# Patient Record
Sex: MALE | Race: BLACK OR AFRICAN AMERICAN | NOT HISPANIC OR LATINO | Employment: OTHER | URBAN - METROPOLITAN AREA
[De-identification: names, ages, dates, MRNs, and addresses within clinical notes are randomized per-mention and may not be internally consistent; named-entity substitution may affect disease eponyms.]

---

## 2023-02-12 PROBLEM — G20.C PARKINSONISM (MULTI): Status: ACTIVE | Noted: 2023-02-12

## 2023-02-12 PROBLEM — R14.0 ABDOMINAL DISTENSION: Status: ACTIVE | Noted: 2023-02-12

## 2023-02-12 PROBLEM — J20.9 ACUTE BRONCHITIS WITH BRONCHOSPASM: Status: ACTIVE | Noted: 2023-02-12

## 2023-02-12 PROBLEM — R00.2 HEART PALPITATIONS: Status: ACTIVE | Noted: 2023-02-12

## 2023-02-12 PROBLEM — T78.3XXA ANGIOEDEMA: Status: ACTIVE | Noted: 2023-02-12

## 2023-02-12 PROBLEM — J84.9 INTERSTITIAL LUNG DISEASE (MULTI): Status: ACTIVE | Noted: 2023-02-12

## 2023-02-12 PROBLEM — R25.1 TREMOR, UNSPECIFIED: Status: ACTIVE | Noted: 2023-02-12

## 2023-02-12 PROBLEM — I10 HYPERTENSION: Status: ACTIVE | Noted: 2023-02-12

## 2023-02-12 PROBLEM — D68.9 COAGULOPATHY (MULTI): Status: ACTIVE | Noted: 2023-02-12

## 2023-02-12 PROBLEM — R05.9 COUGH: Status: ACTIVE | Noted: 2023-02-12

## 2023-02-12 PROBLEM — D12.6 TUBULAR ADENOMA OF COLON: Status: ACTIVE | Noted: 2023-02-12

## 2023-02-12 PROBLEM — K59.00 CONSTIPATION: Status: ACTIVE | Noted: 2023-02-12

## 2023-02-12 PROBLEM — J18.9 PNEUMONIA OF RIGHT LOWER LOBE DUE TO INFECTIOUS ORGANISM: Status: ACTIVE | Noted: 2023-02-12

## 2023-02-12 PROBLEM — J32.9 PURULENT POSTNASAL DRAINAGE: Status: ACTIVE | Noted: 2023-02-12

## 2023-02-12 PROBLEM — N18.31 ANEMIA OF CHRONIC RENAL FAILURE, STAGE 3A (MULTI): Status: ACTIVE | Noted: 2023-02-12

## 2023-02-12 PROBLEM — N28.1 COMPLEX RENAL CYST: Status: ACTIVE | Noted: 2023-02-12

## 2023-02-12 PROBLEM — I50.9 CHF (CONGESTIVE HEART FAILURE) (MULTI): Status: ACTIVE | Noted: 2023-02-12

## 2023-02-12 PROBLEM — J30.9 ALLERGIC RHINITIS: Status: ACTIVE | Noted: 2023-02-12

## 2023-02-12 PROBLEM — I48.91 ATRIAL FIBRILLATION (MULTI): Status: ACTIVE | Noted: 2023-02-12

## 2023-02-12 PROBLEM — J45.909 ASTHMA (HHS-HCC): Status: ACTIVE | Noted: 2023-02-12

## 2023-02-12 PROBLEM — E11.9 ABNORMAL METABOLIC STATE DUE TO DIABETES MELLITUS (MULTI): Status: ACTIVE | Noted: 2023-02-12

## 2023-02-12 PROBLEM — N39.0 ACUTE UTI: Status: ACTIVE | Noted: 2023-02-12

## 2023-02-12 PROBLEM — R97.20 ELEVATED PROSTATE SPECIFIC ANTIGEN (PSA): Status: ACTIVE | Noted: 2023-02-12

## 2023-02-12 PROBLEM — R25.1 OCCASIONAL TREMORS: Status: ACTIVE | Noted: 2023-02-12

## 2023-02-12 PROBLEM — R07.89 ATYPICAL CHEST PAIN: Status: ACTIVE | Noted: 2023-02-12

## 2023-02-12 PROBLEM — D63.1 ANEMIA OF CHRONIC RENAL FAILURE, STAGE 3A (MULTI): Status: ACTIVE | Noted: 2023-02-12

## 2023-02-12 PROBLEM — I73.9 INTERMITTENT CLAUDICATION (CMS-HCC): Status: ACTIVE | Noted: 2023-02-12

## 2023-02-12 PROBLEM — N40.0 ENLARGED PROSTATE WITHOUT LOWER URINARY TRACT SYMPTOMS (LUTS): Status: ACTIVE | Noted: 2023-02-12

## 2023-02-12 PROBLEM — E78.5 HYPERLIPIDEMIA: Status: ACTIVE | Noted: 2023-02-12

## 2023-02-12 PROBLEM — N13.8 BPH WITH OBSTRUCTION/LOWER URINARY TRACT SYMPTOMS: Status: ACTIVE | Noted: 2023-02-12

## 2023-02-12 PROBLEM — F52.4 PREMATURE EJACULATION: Status: ACTIVE | Noted: 2023-02-12

## 2023-02-12 PROBLEM — N52.9 ED (ERECTILE DYSFUNCTION): Status: ACTIVE | Noted: 2023-02-12

## 2023-02-12 PROBLEM — K62.5 RECTAL BLEED: Status: ACTIVE | Noted: 2023-02-12

## 2023-02-12 PROBLEM — N40.1 BPH WITH OBSTRUCTION/LOWER URINARY TRACT SYMPTOMS: Status: ACTIVE | Noted: 2023-02-12

## 2023-02-12 PROBLEM — D64.9 ANEMIA: Status: ACTIVE | Noted: 2023-02-12

## 2023-02-12 PROBLEM — R06.2 WHEEZING: Status: ACTIVE | Noted: 2023-02-12

## 2023-02-12 PROBLEM — R25.2 CRAMP OF BOTH LOWER EXTREMITIES: Status: ACTIVE | Noted: 2023-02-12

## 2023-02-12 PROBLEM — R01.1 HEART MURMUR: Status: ACTIVE | Noted: 2023-02-12

## 2023-02-12 PROBLEM — N28.9 KIDNEY LESION: Status: ACTIVE | Noted: 2023-02-12

## 2023-03-27 ENCOUNTER — NURSING HOME VISIT (OUTPATIENT)
Dept: POST ACUTE CARE | Facility: EXTERNAL LOCATION | Age: 78
End: 2023-03-27
Payer: COMMERCIAL

## 2023-03-27 DIAGNOSIS — R53.1 WEAKNESS: Primary | ICD-10-CM

## 2023-03-27 DIAGNOSIS — I48.0 PAROXYSMAL ATRIAL FIBRILLATION (MULTI): ICD-10-CM

## 2023-03-27 DIAGNOSIS — K59.00 CONSTIPATION, UNSPECIFIED CONSTIPATION TYPE: ICD-10-CM

## 2023-03-27 DIAGNOSIS — G20.A1 PARKINSON'S DISEASE (MULTI): ICD-10-CM

## 2023-03-27 DIAGNOSIS — I10 HYPERTENSION, ESSENTIAL: ICD-10-CM

## 2023-03-27 DIAGNOSIS — I50.9 CHRONIC CONGESTIVE HEART FAILURE, UNSPECIFIED HEART FAILURE TYPE (MULTI): ICD-10-CM

## 2023-03-27 PROBLEM — J30.9 ALLERGIC RHINITIS: Status: RESOLVED | Noted: 2023-02-12 | Resolved: 2023-03-27

## 2023-03-27 PROBLEM — R25.2 CRAMP OF BOTH LOWER EXTREMITIES: Status: RESOLVED | Noted: 2023-02-12 | Resolved: 2023-03-27

## 2023-03-27 PROBLEM — K62.5 RECTAL BLEED: Status: RESOLVED | Noted: 2023-02-12 | Resolved: 2023-03-27

## 2023-03-27 PROBLEM — N39.0 ACUTE UTI: Status: RESOLVED | Noted: 2023-02-12 | Resolved: 2023-03-27

## 2023-03-27 PROBLEM — R14.0 ABDOMINAL DISTENSION: Status: RESOLVED | Noted: 2023-02-12 | Resolved: 2023-03-27

## 2023-03-27 PROBLEM — J18.9 PNEUMONIA OF RIGHT LOWER LOBE DUE TO INFECTIOUS ORGANISM: Status: RESOLVED | Noted: 2023-02-12 | Resolved: 2023-03-27

## 2023-03-27 PROBLEM — J32.9 PURULENT POSTNASAL DRAINAGE: Status: RESOLVED | Noted: 2023-02-12 | Resolved: 2023-03-27

## 2023-03-27 PROBLEM — R06.2 WHEEZING: Status: RESOLVED | Noted: 2023-02-12 | Resolved: 2023-03-27

## 2023-03-27 PROBLEM — R05.9 COUGH: Status: RESOLVED | Noted: 2023-02-12 | Resolved: 2023-03-27

## 2023-03-27 PROCEDURE — 99309 SBSQ NF CARE MODERATE MDM 30: CPT | Performed by: NURSE PRACTITIONER

## 2023-03-27 NOTE — LETTER
Patient: Shashi Gutierrez  : 1945    Encounter Date: 2023    PROGRESS NOTE  Subjective  Chief complaint: Shashi Gutierrez is a 77 y.o. male who is an acute skilled patient being seen and evaluated for weakness    HPI:  3/27/2023 patient admitted to skilled nursing facility for therapy due to weakness after recent hospitalization for difficulty walking.  Patient was experiencing difficulty walking for 2 days due to leg weakness so he went to the emergency room.  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  Neurology recommended physical therapy and fall precautions.  Patient was discharged to skilled nursing facility for therapy and to continue medical management.  Today patient endorses difficulty moving his bowels.  He denies abdominal pain nausea and vomiting.      Objective  Vital signs: 112/68, 18, 97.6, 68, 95%    Physical Exam  Constitutional:       General: He is not in acute distress.  Eyes:      Extraocular Movements: Extraocular movements intact.   Cardiovascular:      Rate and Rhythm: Regular rhythm.   Pulmonary:      Effort: Pulmonary effort is normal.      Breath sounds: Normal breath sounds.   Abdominal:      General: Bowel sounds are normal.      Palpations: Abdomen is soft.   Musculoskeletal:      Cervical back: Neck supple.      Right lower leg: Edema present.      Left lower leg: Edema present.      Comments: Generalized weakness   Neurological:      Mental Status: He is alert.   Psychiatric:         Mood and Affect: Mood normal.         Behavior: Behavior is cooperative.         Assessment/Plan  Problem List Items Addressed This Visit       CHF (congestive heart failure) (CMS/[facility])     Stable  Diuretic  Low-sodium diet  Monitor weight         Constipation     Increase senna         Hypertension, essential      Controlled  Continue antihypertensives  Continue to monitor blood pressure         Parkinson's disease (CMS/HCC)     Therapy to eval and treat         Paroxysmal atrial fibrillation (CMS/HCC)     Heart rate controlled  Anticoagulant  Monitor for bleeding         Weakness - Primary     Therapy          Medications, treatments, and labs reviewed  Continue medications and treatments as listed in PCC    TERENCE Dave      Electronically Signed By: TERENCE Dave   3/27/23  6:44 PM

## 2023-03-27 NOTE — PROGRESS NOTES
PROGRESS NOTE  Subjective   Chief complaint: Shashi Gutierrez is a 77 y.o. male who is an acute skilled patient being seen and evaluated for weakness    HPI:  3/27/2023 patient admitted to skilled nursing facility for therapy due to weakness after recent hospitalization for difficulty walking.  Patient was experiencing difficulty walking for 2 days due to leg weakness so he went to the emergency room.  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  Neurology recommended physical therapy and fall precautions.  Patient was discharged to skilled nursing facility for therapy and to continue medical management.  Today patient endorses difficulty moving his bowels.  He denies abdominal pain nausea and vomiting.      Objective   Vital signs: 112/68, 18, 97.6, 68, 95%    Physical Exam  Constitutional:       General: He is not in acute distress.  Eyes:      Extraocular Movements: Extraocular movements intact.   Cardiovascular:      Rate and Rhythm: Regular rhythm.   Pulmonary:      Effort: Pulmonary effort is normal.      Breath sounds: Normal breath sounds.   Abdominal:      General: Bowel sounds are normal.      Palpations: Abdomen is soft.   Musculoskeletal:      Cervical back: Neck supple.      Right lower leg: Edema present.      Left lower leg: Edema present.      Comments: Generalized weakness   Neurological:      Mental Status: He is alert.   Psychiatric:         Mood and Affect: Mood normal.         Behavior: Behavior is cooperative.         Assessment/Plan   Problem List Items Addressed This Visit       CHF (congestive heart failure) (CMS/[facility])     Stable  Diuretic  Low-sodium diet  Monitor weight         Constipation     Increase senna         Hypertension, essential     Controlled  Continue antihypertensives  Continue to monitor blood pressure          Parkinson's disease (CMS/HCC)     Therapy to eval and treat         Paroxysmal atrial fibrillation (CMS/HCC)     Heart rate controlled  Anticoagulant  Monitor for bleeding         Weakness - Primary     Therapy          Medications, treatments, and labs reviewed  Continue medications and treatments as listed in PCC    ASHLEY Dave-CNP

## 2023-03-27 NOTE — ASSESSMENT & PLAN NOTE
>>ASSESSMENT AND PLAN FOR PAROXYSMAL ATRIAL FIBRILLATION (CMS/HCC) WRITTEN ON 3/27/2023  6:44 PM BY ASHLEY SORTO-MARCIAL    Heart rate controlled  Anticoagulant  Monitor for bleeding

## 2023-03-28 ENCOUNTER — NURSING HOME VISIT (OUTPATIENT)
Dept: POST ACUTE CARE | Facility: EXTERNAL LOCATION | Age: 78
End: 2023-03-28
Payer: COMMERCIAL

## 2023-03-28 DIAGNOSIS — I50.9 CHRONIC CONGESTIVE HEART FAILURE, UNSPECIFIED HEART FAILURE TYPE (MULTI): ICD-10-CM

## 2023-03-28 DIAGNOSIS — J84.9 INTERSTITIAL LUNG DISEASE (MULTI): ICD-10-CM

## 2023-03-28 DIAGNOSIS — G20.A1 PARKINSON'S DISEASE (MULTI): ICD-10-CM

## 2023-03-28 DIAGNOSIS — R25.1 TREMOR, UNSPECIFIED: ICD-10-CM

## 2023-03-28 DIAGNOSIS — R53.1 WEAKNESS: ICD-10-CM

## 2023-03-28 DIAGNOSIS — I48.0 PAROXYSMAL ATRIAL FIBRILLATION (MULTI): ICD-10-CM

## 2023-03-28 DIAGNOSIS — I10 HYPERTENSION, ESSENTIAL: Primary | ICD-10-CM

## 2023-03-28 PROBLEM — E11.9 ABNORMAL METABOLIC STATE DUE TO DIABETES MELLITUS (MULTI): Status: RESOLVED | Noted: 2023-02-12 | Resolved: 2023-03-28

## 2023-03-28 PROBLEM — N40.0 ENLARGED PROSTATE WITHOUT LOWER URINARY TRACT SYMPTOMS (LUTS): Status: RESOLVED | Noted: 2023-02-12 | Resolved: 2023-03-28

## 2023-03-28 PROBLEM — R00.2 HEART PALPITATIONS: Status: RESOLVED | Noted: 2023-02-12 | Resolved: 2023-03-28

## 2023-03-28 PROBLEM — J20.9 ACUTE BRONCHITIS WITH BRONCHOSPASM: Status: RESOLVED | Noted: 2023-02-12 | Resolved: 2023-03-28

## 2023-03-28 PROBLEM — F52.4 PREMATURE EJACULATION: Status: RESOLVED | Noted: 2023-02-12 | Resolved: 2023-03-28

## 2023-03-28 PROCEDURE — 99309 SBSQ NF CARE MODERATE MDM 30: CPT | Performed by: NURSE PRACTITIONER

## 2023-03-28 NOTE — PROGRESS NOTES
PROGRESS NOTE  Subjective   Chief complaint: Shashi Gutierrez is a 77 y.o. male who is an acute skilled patient being seen and evaluated for weakness    HPI:  3/27/2023 patient admitted to skilled nursing facility for therapy due to weakness after recent hospitalization for difficulty walking.  Patient was experiencing difficulty walking for 2 days due to leg weakness so he went to the emergency room.  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  Neurology recommended physical therapy and fall precautions.  Patient was discharged to skilled nursing facility for therapy and to continue medical management.  Today patient endorses difficulty moving his bowels.  He denies abdominal pain nausea and vomiting.    3/28/2023 patient seen and examined at bedside working with therapy.  Patient states he is feeling okay.  He is able to walk 200 feet with assist according to the therapist.  No new concerns today.      Objective   Vital signs: 149/77, 18, 97.8, 63, 95%    Physical Exam  Constitutional:       General: He is not in acute distress.  Eyes:      Extraocular Movements: Extraocular movements intact.   Cardiovascular:      Rate and Rhythm: Regular rhythm.   Pulmonary:      Effort: Pulmonary effort is normal.      Breath sounds: Normal breath sounds.   Abdominal:      General: Bowel sounds are normal.      Palpations: Abdomen is soft.   Musculoskeletal:      Cervical back: Neck supple.      Right lower leg: Edema present.      Left lower leg: Edema present.      Comments: Generalized weakness   Neurological:      Mental Status: He is alert.      Comments: Tremor   Psychiatric:         Mood and Affect: Mood normal.         Behavior: Behavior is cooperative.         Assessment/Plan   Problem List Items Addressed This Visit       CHF (congestive heart  failure) (CMS/HCC)     Stable  Diuretic  Low-sodium diet  Monitor weight         Hypertension, essential - Primary     Controlled  Continue antihypertensives  Continue to monitor blood pressure         Interstitial lung disease (CMS/HCC)     Stable  On RA         Parkinson's disease (CMS/HCC)    Paroxysmal atrial fibrillation (CMS/HCC)     Heart rate controlled  Anticoagulant  Monitor for bleeding         Tremor, unspecified    Weakness     Therapy          Medications, treatments, and labs reviewed  Continue medications and treatments as listed in PCC    Chelsie Gallegos, APRN-CNP

## 2023-03-28 NOTE — LETTER
Patient: Shashi Gutierrez  : 1945    Encounter Date: 2023    PROGRESS NOTE  Subjective  Chief complaint: Shashi Gutierrez is a 77 y.o. male who is an acute skilled patient being seen and evaluated for weakness    HPI:  3/27/2023 patient admitted to skilled nursing facility for therapy due to weakness after recent hospitalization for difficulty walking.  Patient was experiencing difficulty walking for 2 days due to leg weakness so he went to the emergency room.  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  Neurology recommended physical therapy and fall precautions.  Patient was discharged to skilled nursing facility for therapy and to continue medical management.  Today patient endorses difficulty moving his bowels.  He denies abdominal pain nausea and vomiting.    3/28/2023 patient seen and examined at bedside working with therapy.  Patient states he is feeling okay.  He is able to walk 200 feet with assist according to the therapist.  No new concerns today.      Objective  Vital signs: 149/77, 18, 97.8, 63, 95%    Physical Exam  Constitutional:       General: He is not in acute distress.  Eyes:      Extraocular Movements: Extraocular movements intact.   Cardiovascular:      Rate and Rhythm: Regular rhythm.   Pulmonary:      Effort: Pulmonary effort is normal.      Breath sounds: Normal breath sounds.   Abdominal:      General: Bowel sounds are normal.      Palpations: Abdomen is soft.   Musculoskeletal:      Cervical back: Neck supple.      Right lower leg: Edema present.      Left lower leg: Edema present.      Comments: Generalized weakness   Neurological:      Mental Status: He is alert.      Comments: Tremor   Psychiatric:         Mood and Affect: Mood normal.         Behavior: Behavior is cooperative.         Assessment/Plan  Problem  List Items Addressed This Visit       CHF (congestive heart failure) (CMS/[facility])     Stable  Diuretic  Low-sodium diet  Monitor weight         Hypertension, essential - Primary     Controlled  Continue antihypertensives  Continue to monitor blood pressure         Interstitial lung disease (CMS/[facility])     Stable  On RA         Parkinson's disease (CMS/[facility])    Paroxysmal atrial fibrillation (CMS/[facility])     Heart rate controlled  Anticoagulant  Monitor for bleeding         Tremor, unspecified    Weakness     Therapy          Medications, treatments, and labs reviewed  Continue medications and treatments as listed in PCC    TERENCE Dave      Electronically Signed By: TERENCE Dave   3/28/23  7:36 PM

## 2023-03-28 NOTE — ASSESSMENT & PLAN NOTE
>>ASSESSMENT AND PLAN FOR PAROXYSMAL ATRIAL FIBRILLATION (CMS/HCC) WRITTEN ON 3/28/2023  7:35 PM BY ASHLEY SORTO-MARCIAL    Heart rate controlled  Anticoagulant  Monitor for bleeding

## 2023-03-29 ENCOUNTER — NURSING HOME VISIT (OUTPATIENT)
Dept: POST ACUTE CARE | Facility: EXTERNAL LOCATION | Age: 78
End: 2023-03-29
Payer: COMMERCIAL

## 2023-03-29 DIAGNOSIS — I10 HYPERTENSION, ESSENTIAL: Primary | ICD-10-CM

## 2023-03-29 DIAGNOSIS — I50.9 CHRONIC CONGESTIVE HEART FAILURE, UNSPECIFIED HEART FAILURE TYPE (MULTI): ICD-10-CM

## 2023-03-29 DIAGNOSIS — N13.8 BPH WITH OBSTRUCTION/LOWER URINARY TRACT SYMPTOMS: ICD-10-CM

## 2023-03-29 DIAGNOSIS — G20.A1 PARKINSON'S DISEASE (MULTI): ICD-10-CM

## 2023-03-29 DIAGNOSIS — N40.1 BPH WITH OBSTRUCTION/LOWER URINARY TRACT SYMPTOMS: ICD-10-CM

## 2023-03-29 DIAGNOSIS — J45.909 MODERATE ASTHMA, UNSPECIFIED WHETHER COMPLICATED, UNSPECIFIED WHETHER PERSISTENT (HHS-HCC): ICD-10-CM

## 2023-03-29 PROCEDURE — 99306 1ST NF CARE HIGH MDM 50: CPT | Performed by: INTERNAL MEDICINE

## 2023-03-29 NOTE — LETTER
Patient: Shashi Gutierrez  : 1945    Encounter Date: 2023    HISTORY & PHYSICAL    Subjective  Chief complaint: Shashi Gutierrez is a 77 y.o. male who is a acute skilled care patient being seen and evaluated for multiple medical problems.  Patient presents for Weakness    HPI:  77 years old black male patient with past medical history of congestive heart failure is, COPD, atherosclerotic heart disease, prostate cancer, hypertension, Parkinson disease. patient admitted to skilled nursing facility for therapy due to weakness after recent hospitalization for difficulty walking.  Patient was experiencing difficulty walking for 2 days due to leg weakness so he went to the emergency room.  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  Neurology recommended physical therapy and fall precautions.  Patient was discharged to skilled nursing facility for therapy and to continue medical management.  Today patient endorses difficulty moving his bowels.  He denies abdominal pain nausea and vomiting.        Past Medical History:   Diagnosis Date   • Encounter for general adult medical examination without abnormal findings 2019    Encounter for annual health examination   • Encounter for general adult medical examination without abnormal findings 2021    Encounter for annual health examination   • Encounter for screening for malignant neoplasm of prostate     Encounter for prostate cancer screening       Past Surgical History:   Procedure Laterality Date   • CT HEART CORONARY ANGIOGRAM  2018    CT HEART CORONARY ANGIOGRAM 2018 [facility]       Family History   Problem Relation Name Age of Onset   • No Known Problems Other       Social history does not smoke or drink        Vital signs: 134/80-80-17    Objective  Physical  Exam  Vitals reviewed.   Constitutional:       Appearance: Normal appearance.   HENT:      Head: Normocephalic and atraumatic.   Cardiovascular:      Rate and Rhythm: Normal rate and regular rhythm.   Pulmonary:      Effort: Pulmonary effort is normal.      Breath sounds: Normal breath sounds.   Abdominal:      General: Bowel sounds are normal.      Palpations: Abdomen is soft.   Musculoskeletal:      Cervical back: Neck supple.   Skin:     General: Skin is warm and dry.   Neurological:      General: No focal deficit present.      Mental Status: He is alert.      Comments: Positive for tremor and generalized weakness and increased muscle stiffness use the walker   Psychiatric:         Mood and Affect: Mood normal.         Behavior: Behavior is cooperative.         Assessment/Plan  Problem List Items Addressed This Visit          Nervous    Parkinson's disease (CMS/[facility])     Therapy to eval and treatFor ambulation muscle strengthening balance            Respiratory    Asthma     Aerosol treatment as needed            Circulatory    CHF (congestive heart failure) (CMS/[facility])     Stable  Diuretic  Low-sodium diet  Monitor weight         Hypertension, essential - Primary     Controlled  Continue antihypertensives  Continue to monitor blood pressure            Genitourinary    BPH with obstruction/lower urinary tract symptoms     Flomax          Medications, treatments, and labs reviewed  Continue medications and treatments as listed in PCC    Tiburcio Dueñas MD      Electronically Signed By: Tiburcio Dueñas MD   4/1/23  9:19 AM

## 2023-03-30 ENCOUNTER — NURSING HOME VISIT (OUTPATIENT)
Dept: POST ACUTE CARE | Facility: EXTERNAL LOCATION | Age: 78
End: 2023-03-30
Payer: COMMERCIAL

## 2023-03-30 DIAGNOSIS — I48.0 PAROXYSMAL ATRIAL FIBRILLATION (MULTI): ICD-10-CM

## 2023-03-30 DIAGNOSIS — R53.1 WEAKNESS: Primary | ICD-10-CM

## 2023-03-30 DIAGNOSIS — I50.9 CHRONIC CONGESTIVE HEART FAILURE, UNSPECIFIED HEART FAILURE TYPE (MULTI): ICD-10-CM

## 2023-03-30 DIAGNOSIS — I10 HYPERTENSION, ESSENTIAL: ICD-10-CM

## 2023-03-30 PROCEDURE — 99309 SBSQ NF CARE MODERATE MDM 30: CPT | Performed by: NURSE PRACTITIONER

## 2023-03-30 NOTE — LETTER
Patient: Shashi Gutierrez  : 1945    Encounter Date: 2023    PROGRESS NOTE  Subjective  Chief complaint: Shashi Gutierrez is a 77 y.o. male who is an acute skilled patient being seen and evaluated for weakness    HPI:  3/27/2023 patient admitted to skilled nursing facility for therapy due to weakness after recent hospitalization for difficulty walking.  Patient was experiencing difficulty walking for 2 days due to leg weakness so he went to the emergency room.  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  Neurology recommended physical therapy and fall precautions.  Patient was discharged to skilled nursing facility for therapy and to continue medical management.  Today patient endorses difficulty moving his bowels.  He denies abdominal pain nausea and vomiting.    3/28/2023 patient seen and examined at bedside working with therapy.  Patient states he is feeling okay.  He is able to walk 200 feet with assist according to the therapist.  No new concerns today.    3/30/2023 patient is at skilled nursing facility for therapy due to generalized weakness.  He continues to work towards goals.  He is able to walk 200 feet with assist.  Denies constitutional symptoms.      Objective  Vital signs: 114/47, 18, 97.4, 72, 91%    Physical Exam  Constitutional:       General: He is not in acute distress.  Eyes:      Extraocular Movements: Extraocular movements intact.   Cardiovascular:      Rate and Rhythm: Regular rhythm.   Pulmonary:      Effort: Pulmonary effort is normal.      Breath sounds: Normal breath sounds.   Abdominal:      General: Bowel sounds are normal.      Palpations: Abdomen is soft.   Musculoskeletal:      Cervical back: Neck supple.      Right lower leg: Edema present.      Left lower leg: Edema present.      Comments: Generalized  weakness   Neurological:      Mental Status: He is alert.      Comments: Tremor   Psychiatric:         Mood and Affect: Mood normal.         Behavior: Behavior is cooperative.         Assessment/Plan  Problem List Items Addressed This Visit       CHF (congestive heart failure) (CMS/[facility])     Stable  Diuretic  Low-sodium diet  Monitor weight         Hypertension, essential     Controlled  Continue antihypertensives  Continue to monitor blood pressure         Paroxysmal atrial fibrillation (CMS/[facility])     Heart rate controlled  Anticoagulant  Monitor for bleeding         Weakness - Primary     Improving  Continue therapy          Medications, treatments, and labs reviewed  Continue medications and treatments as listed in Psychiatric    TERENCE Dave      Electronically Signed By: TERENCE Dave   4/1/23  3:35 PM

## 2023-03-31 ENCOUNTER — NURSING HOME VISIT (OUTPATIENT)
Dept: POST ACUTE CARE | Facility: EXTERNAL LOCATION | Age: 78
End: 2023-03-31
Payer: COMMERCIAL

## 2023-03-31 DIAGNOSIS — I50.9 CHRONIC CONGESTIVE HEART FAILURE, UNSPECIFIED HEART FAILURE TYPE (MULTI): ICD-10-CM

## 2023-03-31 DIAGNOSIS — I10 HYPERTENSION, ESSENTIAL: ICD-10-CM

## 2023-03-31 DIAGNOSIS — R53.1 WEAKNESS: ICD-10-CM

## 2023-03-31 PROCEDURE — 99308 SBSQ NF CARE LOW MDM 20: CPT | Performed by: INTERNAL MEDICINE

## 2023-03-31 NOTE — LETTER
Patient: Shashi Gutierrez  : 1945    Encounter Date: 2023    Subjective  Chief complaint: Shashi Gutierrez is a 77 y.o. male who is a acute skilled care patient being seen and evaluated for weakness    HPI:  3/27/2023 patient admitted to skilled nursing facility for therapy due to weakness after recent hospitalization for difficulty walking.  Patient was experiencing difficulty walking for 2 days due to leg weakness so he went to the emergency room.  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  Neurology recommended physical therapy and fall precautions.  Patient was discharged to skilled nursing facility for therapy and to continue medical management.  Today patient endorses difficulty moving his bowels.  He denies abdominal pain nausea and vomiting.    3/28/2023 patient seen and examined at bedside working with therapy.  Patient states he is feeling okay.  He is able to walk 200 feet with assist according to the therapist.  No new concerns today.    3/30/2023 patient is at skilled nursing facility for therapy due to generalized weakness.  He continues to work towards goals.  He is able to walk 200 feet with assist.  Denies constitutional symptoms.    3/31/23 Patient working in therapy due to weakness. He is ambulating up to 200' with CGA. No new issues or concerns at this time. Denies SOB.         Review of Systems  All systems reviewed and negative except for what was mentioned in the HPI    Vital signs: 139/73, 60, 18, 96%    Objective  Physical Exam  Constitutional:       General: He is not in acute distress.  Eyes:      Extraocular Movements: Extraocular movements intact.   Cardiovascular:      Rate and Rhythm: Regular rhythm.   Pulmonary:      Effort: Pulmonary effort is normal.      Breath sounds: Normal breath sounds.   Abdominal:       General: Bowel sounds are normal.      Palpations: Abdomen is soft.   Musculoskeletal:      Cervical back: Neck supple.      Right lower leg: No edema.      Left lower leg: No edema.   Neurological:      Mental Status: He is alert.   Psychiatric:         Mood and Affect: Mood normal.         Behavior: Behavior is cooperative.         Assessment/Plan  Problem List Items Addressed This Visit          Circulatory    CHF (congestive heart failure) (CMS/[facility])     Stable  Diuretic  Low-sodium diet  Monitor weight         Hypertension, essential     Continue antihypertensives  Continue to monitor blood pressure            Other    Weakness     Improving  Continue therapy          Medications, treatments, and labs reviewed  Continue medications and treatments as listed in PCC    Scribe Attestation  By signing my name below, IAndree Scribnirali   attest that this documentation has been prepared under the direction and in the presence of Tiburcio Dueñas MD.    Provider Attestation - Scribe documentation  All medical record entries made by the Scribe were at my direction and personally dictated by me. I have reviewed the chart and agree that the record accurately reflects my personal performance of the history, physical exam, discussion and plan.      Electronically Signed By: Tiburcio Dueñas MD   4/3/23  5:35 PM

## 2023-03-31 NOTE — PROGRESS NOTES
PROGRESS NOTE  Subjective   Chief complaint: Shashi Gutierrez is a 77 y.o. male who is an acute skilled patient being seen and evaluated for weakness    HPI:  3/27/2023 patient admitted to skilled nursing facility for therapy due to weakness after recent hospitalization for difficulty walking.  Patient was experiencing difficulty walking for 2 days due to leg weakness so he went to the emergency room.  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  Neurology recommended physical therapy and fall precautions.  Patient was discharged to skilled nursing facility for therapy and to continue medical management.  Today patient endorses difficulty moving his bowels.  He denies abdominal pain nausea and vomiting.    3/28/2023 patient seen and examined at bedside working with therapy.  Patient states he is feeling okay.  He is able to walk 200 feet with assist according to the therapist.  No new concerns today.    3/30/2023 patient is at skilled nursing facility for therapy due to generalized weakness.  He continues to work towards goals.  He is able to walk 200 feet with assist.  Denies constitutional symptoms.      Objective   Vital signs: 114/47, 18, 97.4, 72, 91%    Physical Exam  Constitutional:       General: He is not in acute distress.  Eyes:      Extraocular Movements: Extraocular movements intact.   Cardiovascular:      Rate and Rhythm: Regular rhythm.   Pulmonary:      Effort: Pulmonary effort is normal.      Breath sounds: Normal breath sounds.   Abdominal:      General: Bowel sounds are normal.      Palpations: Abdomen is soft.   Musculoskeletal:      Cervical back: Neck supple.      Right lower leg: Edema present.      Left lower leg: Edema present.      Comments: Generalized weakness   Neurological:      Mental Status: He is alert.       Comments: Tremor   Psychiatric:         Mood and Affect: Mood normal.         Behavior: Behavior is cooperative.         Assessment/Plan   Problem List Items Addressed This Visit       CHF (congestive heart failure) (CMS/[facility])     Stable  Diuretic  Low-sodium diet  Monitor weight         Hypertension, essential     Controlled  Continue antihypertensives  Continue to monitor blood pressure         Paroxysmal atrial fibrillation (CMS/[facility])     Heart rate controlled  Anticoagulant  Monitor for bleeding         Weakness - Primary     Improving  Continue therapy          Medications, treatments, and labs reviewed  Continue medications and treatments as listed in PCC    Chelsie Gallegos, APRN-CNP

## 2023-04-01 NOTE — ASSESSMENT & PLAN NOTE
>>ASSESSMENT AND PLAN FOR PAROXYSMAL ATRIAL FIBRILLATION (CMS/HCC) WRITTEN ON 4/1/2023  3:34 PM BY ASHLEY SORTO-MARCIAL    Heart rate controlled  Anticoagulant  Monitor for bleeding

## 2023-04-01 NOTE — PROGRESS NOTES
HISTORY & PHYSICAL    Subjective   Chief complaint: Shashi Gutierrez is a 77 y.o. male who is a acute skilled care patient being seen and evaluated for multiple medical problems.  Patient presents for Weakness    HPI:  77 years old black male patient with past medical history of congestive heart failure is, COPD, atherosclerotic heart disease, prostate cancer, hypertension, Parkinson disease. patient admitted to skilled nursing facility for therapy due to weakness after recent hospitalization for difficulty walking.  Patient was experiencing difficulty walking for 2 days due to leg weakness so he went to the emergency room.  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  Neurology recommended physical therapy and fall precautions.  Patient was discharged to skilled nursing facility for therapy and to continue medical management.  Today patient endorses difficulty moving his bowels.  He denies abdominal pain nausea and vomiting.        Past Medical History:   Diagnosis Date    Encounter for general adult medical examination without abnormal findings 03/04/2019    Encounter for annual health examination    Encounter for general adult medical examination without abnormal findings 01/03/2021    Encounter for annual health examination    Encounter for screening for malignant neoplasm of prostate     Encounter for prostate cancer screening       Past Surgical History:   Procedure Laterality Date    CT HEART CORONARY ANGIOGRAM  8/27/2018    CT HEART CORONARY ANGIOGRAM 8/27/2018 [facility]       Family History   Problem Relation Name Age of Onset    No Known Problems Other       Social history does not smoke or drink        Vital signs: 134/80-80-17    Objective   Physical Exam  Vitals reviewed.   Constitutional:       Appearance: Normal appearance.    HENT:      Head: Normocephalic and atraumatic.   Cardiovascular:      Rate and Rhythm: Normal rate and regular rhythm.   Pulmonary:      Effort: Pulmonary effort is normal.      Breath sounds: Normal breath sounds.   Abdominal:      General: Bowel sounds are normal.      Palpations: Abdomen is soft.   Musculoskeletal:      Cervical back: Neck supple.   Skin:     General: Skin is warm and dry.   Neurological:      General: No focal deficit present.      Mental Status: He is alert.      Comments: Positive for tremor and generalized weakness and increased muscle stiffness use the walker   Psychiatric:         Mood and Affect: Mood normal.         Behavior: Behavior is cooperative.         Assessment/Plan   Problem List Items Addressed This Visit          Nervous    Parkinson's disease (CMS/[facility])     Therapy to eval and treatFor ambulation muscle strengthening balance            Respiratory    Asthma     Aerosol treatment as needed            Circulatory    CHF (congestive heart failure) (CMS/[facility])     Stable  Diuretic  Low-sodium diet  Monitor weight         Hypertension, essential - Primary     Controlled  Continue antihypertensives  Continue to monitor blood pressure            Genitourinary    BPH with obstruction/lower urinary tract symptoms     Flomax          Medications, treatments, and labs reviewed  Continue medications and treatments as listed in P[facility]    Tiburcio Dueñas MD

## 2023-04-03 ENCOUNTER — NURSING HOME VISIT (OUTPATIENT)
Dept: POST ACUTE CARE | Facility: EXTERNAL LOCATION | Age: 78
End: 2023-04-03
Payer: COMMERCIAL

## 2023-04-03 ENCOUNTER — DOCUMENTATION (OUTPATIENT)
Dept: POST ACUTE CARE | Facility: EXTERNAL LOCATION | Age: 78
End: 2023-04-03
Payer: COMMERCIAL

## 2023-04-03 DIAGNOSIS — R53.1 WEAKNESS: Primary | ICD-10-CM

## 2023-04-03 DIAGNOSIS — I50.9 CHRONIC CONGESTIVE HEART FAILURE, UNSPECIFIED HEART FAILURE TYPE (MULTI): ICD-10-CM

## 2023-04-03 DIAGNOSIS — I48.0 PAROXYSMAL ATRIAL FIBRILLATION (MULTI): ICD-10-CM

## 2023-04-03 DIAGNOSIS — I10 HYPERTENSION, ESSENTIAL: ICD-10-CM

## 2023-04-03 DIAGNOSIS — J45.909 MODERATE ASTHMA, UNSPECIFIED WHETHER COMPLICATED, UNSPECIFIED WHETHER PERSISTENT (HHS-HCC): ICD-10-CM

## 2023-04-03 PROCEDURE — 99309 SBSQ NF CARE MODERATE MDM 30: CPT | Performed by: NURSE PRACTITIONER

## 2023-04-03 NOTE — LETTER
Patient: Shashi Gutierrez  : 1945    Encounter Date: 2023    PROGRESS NOTE    Subjective  Chief complaint: Shashi Gutierrez is a 77 y.o. male who is an acute skilled patient being seen and evaluated for weakness    HPI:  3/27/2023 patient admitted to skilled nursing facility for therapy due to weakness after recent hospitalization for difficulty walking.  Patient was experiencing difficulty walking for 2 days due to leg weakness so he went to the emergency room.  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  Neurology recommended physical therapy and fall precautions.  Patient was discharged to skilled nursing facility for therapy and to continue medical management.  Today patient endorses difficulty moving his bowels.  He denies abdominal pain nausea and vomiting.    3/28/2023 patient seen and examined at bedside working with therapy.  Patient states he is feeling okay.  He is able to walk 200 feet with assist according to the therapist.  No new concerns today.    3/30/2023 patient is at skilled nursing facility for therapy due to generalized weakness.  He continues to work towards goals.  He is able to walk 200 feet with assist.  Denies constitutional symptoms.    3/31/23 Patient working in therapy due to weakness. He is ambulating up to 200' with CGA. No new issues or concerns at this time. Denies SOB.     4/3/23 Patient feels well and is working on ambulation strengthening and.  He feels that he is getting stronger.  He is walking about 200 feet with contact-guard assist with walker.  Patient has no new concerns today.  He denies constitutional symptoms.      Objective  Vital signs: 139/73    Physical Exam  Constitutional:       General: He is not in acute distress.  Eyes:      Extraocular Movements: Extraocular movements intact.    Cardiovascular:      Rate and Rhythm: Regular rhythm.   Pulmonary:      Effort: Pulmonary effort is normal.      Breath sounds: Normal breath sounds.   Abdominal:      General: Bowel sounds are normal.      Palpations: Abdomen is soft.   Musculoskeletal:      Cervical back: Neck supple.      Right lower leg: Edema present.      Left lower leg: Edema present.      Comments: Generalized weakness   Neurological:      Mental Status: He is alert.      Comments: Tremor   Psychiatric:         Mood and Affect: Mood normal.         Behavior: Behavior is cooperative.         Assessment/Plan  Problem List Items Addressed This Visit       Asthma     Stable  Monitor         CHF (congestive heart failure) (CMS/HCC)     Stable  Continue diuretic  Low-sodium diet  Monitor weight         Hypertension, essential     Controlled with current medication regime  Continue antihypertensives  Continue to monitor blood pressure         Paroxysmal atrial fibrillation (CMS/HCC)     Anticoagulant  Monitor for bleeding         Weakness - Primary     Improving  Continue with therapy        Medications, treatments, and labs reviewed  Continue medications and treatments as listed in [facility]    TERENCE Dave      Electronically Signed By: TERENCE Dave   4/14/23  5:46 PM

## 2023-04-03 NOTE — PROGRESS NOTES
PROGRESS NOTE    Subjective   Chief complaint: Shashi Gutierrez is a 77 y.o. male who is an acute skilled patient being seen and evaluated for weakness    HPI:  3/27/2023 patient admitted to skilled nursing facility for therapy due to weakness after recent hospitalization for difficulty walking.  Patient was experiencing difficulty walking for 2 days due to leg weakness so he went to the emergency room.  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  Neurology recommended physical therapy and fall precautions.  Patient was discharged to skilled nursing facility for therapy and to continue medical management.  Today patient endorses difficulty moving his bowels.  He denies abdominal pain nausea and vomiting.    3/28/2023 patient seen and examined at bedside working with therapy.  Patient states he is feeling okay.  He is able to walk 200 feet with assist according to the therapist.  No new concerns today.    3/30/2023 patient is at skilled nursing facility for therapy due to generalized weakness.  He continues to work towards goals.  He is able to walk 200 feet with assist.  Denies constitutional symptoms.    3/31/23 Patient working in therapy due to weakness. He is ambulating up to 200' with CGA. No new issues or concerns at this time. Denies SOB.     4/3/23 Patient feels well and is working on ambulation strengthening and.  He feels that he is getting stronger.  He is walking about 200 feet with contact-guard assist with walker.  Patient has no new concerns today.  He denies constitutional symptoms.      Objective   Vital signs: 139/73    Physical Exam  Constitutional:       General: He is not in acute distress.  Eyes:      Extraocular Movements: Extraocular movements intact.   Cardiovascular:      Rate and Rhythm: Regular rhythm.   Pulmonary:       Effort: Pulmonary effort is normal.      Breath sounds: Normal breath sounds.   Abdominal:      General: Bowel sounds are normal.      Palpations: Abdomen is soft.   Musculoskeletal:      Cervical back: Neck supple.      Right lower leg: Edema present.      Left lower leg: Edema present.      Comments: Generalized weakness   Neurological:      Mental Status: He is alert.      Comments: Tremor   Psychiatric:         Mood and Affect: Mood normal.         Behavior: Behavior is cooperative.         Assessment/Plan   Problem List Items Addressed This Visit       CHF (congestive heart failure) (CMS/[facility])     Stable  Diuretic  Low-sodium diet  Monitor weight         Hypertension, essential     Controlled  Continue antihypertensives  Continue to monitor blood pressure         Paroxysmal atrial fibrillation (CMS/[facility])     Anticoagulant  Monitor for bleeding         Weakness - Primary     Improving  Continue therapy          Medications, treatments, and labs reviewed  Continue medications and treatments as listed in [facility]    ASHLEY Dave-CNP

## 2023-04-03 NOTE — PROGRESS NOTES
Subjective   Chief complaint: Shashi Gutierrez is a 77 y.o. male who is a acute skilled care patient being seen and evaluated for weakness    HPI:  3/27/2023 patient admitted to skilled nursing facility for therapy due to weakness after recent hospitalization for difficulty walking.  Patient was experiencing difficulty walking for 2 days due to leg weakness so he went to the emergency room.  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  Neurology recommended physical therapy and fall precautions.  Patient was discharged to skilled nursing facility for therapy and to continue medical management.  Today patient endorses difficulty moving his bowels.  He denies abdominal pain nausea and vomiting.    3/28/2023 patient seen and examined at bedside working with therapy.  Patient states he is feeling okay.  He is able to walk 200 feet with assist according to the therapist.  No new concerns today.    3/30/2023 patient is at skilled nursing facility for therapy due to generalized weakness.  He continues to work towards goals.  He is able to walk 200 feet with assist.  Denies constitutional symptoms.    3/31/23 Patient working in therapy due to weakness. He is ambulating up to 200' with CGA. No new issues or concerns at this time. Denies SOB.         Review of Systems  All systems reviewed and negative except for what was mentioned in the HPI    Vital signs: 139/73, 60, 18, 96%    Objective   Physical Exam  Constitutional:       General: He is not in acute distress.  Eyes:      Extraocular Movements: Extraocular movements intact.   Cardiovascular:      Rate and Rhythm: Regular rhythm.   Pulmonary:      Effort: Pulmonary effort is normal.      Breath sounds: Normal breath sounds.   Abdominal:      General: Bowel sounds are normal.      Palpations: Abdomen is  soft.   Musculoskeletal:      Cervical back: Neck supple.      Right lower leg: No edema.      Left lower leg: No edema.   Neurological:      Mental Status: He is alert.   Psychiatric:         Mood and Affect: Mood normal.         Behavior: Behavior is cooperative.         Assessment/Plan   Problem List Items Addressed This Visit          Circulatory    CHF (congestive heart failure) (CMS/[facility])     Stable  Diuretic  Low-sodium diet  Monitor weight         Hypertension, essential     Continue antihypertensives  Continue to monitor blood pressure            Other    Weakness     Improving  Continue therapy          Medications, treatments, and labs reviewed  Continue medications and treatments as listed in P[facility]    Scribe Attestation  By signing my name below, I, Ayse Raymundo   attest that this documentation has been prepared under the direction and in the presence of Tiburcio Dueñas MD.    Provider Attestation - Scribe documentation  All medical record entries made by the Scribe were at my direction and personally dictated by me. I have reviewed the chart and agree that the record accurately reflects my personal performance of the history, physical exam, discussion and plan.

## 2023-04-04 ENCOUNTER — NURSING HOME VISIT (OUTPATIENT)
Dept: POST ACUTE CARE | Facility: EXTERNAL LOCATION | Age: 78
End: 2023-04-04
Payer: COMMERCIAL

## 2023-04-04 DIAGNOSIS — G20.A1 PARKINSON'S DISEASE (MULTI): ICD-10-CM

## 2023-04-04 DIAGNOSIS — R53.1 WEAKNESS: Primary | ICD-10-CM

## 2023-04-04 DIAGNOSIS — I10 HYPERTENSION, ESSENTIAL: ICD-10-CM

## 2023-04-04 DIAGNOSIS — I50.9 CHRONIC CONGESTIVE HEART FAILURE, UNSPECIFIED HEART FAILURE TYPE (MULTI): ICD-10-CM

## 2023-04-04 DIAGNOSIS — I48.0 PAROXYSMAL ATRIAL FIBRILLATION (MULTI): ICD-10-CM

## 2023-04-04 DIAGNOSIS — J45.909 MODERATE ASTHMA, UNSPECIFIED WHETHER COMPLICATED, UNSPECIFIED WHETHER PERSISTENT (HHS-HCC): ICD-10-CM

## 2023-04-04 PROCEDURE — 99309 SBSQ NF CARE MODERATE MDM 30: CPT | Performed by: NURSE PRACTITIONER

## 2023-04-04 NOTE — LETTER
Patient: Shashi Gutierrez  : 1945    Encounter Date: 2023    PROGRESS NOTE    Subjective  Chief complaint: Shashi Gutierrez is a 77 y.o. male who is an acute skilled patient being seen and evaluated for weakness    HPI:  3/27/2023 patient admitted to skilled nursing facility for therapy due to weakness after recent hospitalization for difficulty walking.  Patient was experiencing difficulty walking for 2 days due to leg weakness so he went to the emergency room.  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  Neurology recommended physical therapy and fall precautions.  Patient was discharged to skilled nursing facility for therapy and to continue medical management.  Today patient endorses difficulty moving his bowels.  He denies abdominal pain nausea and vomiting.    3/28/2023 patient seen and examined at bedside working with therapy.  Patient states he is feeling okay.  He is able to walk 200 feet with assist according to the therapist.  No new concerns today.    3/30/2023 patient is at skilled nursing facility for therapy due to generalized weakness.  He continues to work towards goals.  He is able to walk 200 feet with assist.  Denies constitutional symptoms.    3/31/23 Patient working in therapy due to weakness. He is ambulating up to 200' with CGA. No new issues or concerns at this time. Denies SOB.     4/3/23 Patient feels well and is working on ambulation strengthening and.  He feels that he is getting stronger.  He is walking about 200 feet with contact-guard assist with walker.  Patient has no new concerns today.  He denies constitutional symptoms.    23 uneventful night.  No new concerns today.  Patient states he is feeling better.  Continues to work towards goals in therapy.      Objective  Vital signs: 110/62    Physical  Exam  Constitutional:       General: He is not in acute distress.  Eyes:      Extraocular Movements: Extraocular movements intact.   Cardiovascular:      Rate and Rhythm: Regular rhythm.   Pulmonary:      Effort: Pulmonary effort is normal.      Breath sounds: Normal breath sounds.   Abdominal:      General: Bowel sounds are normal.      Palpations: Abdomen is soft.   Musculoskeletal:      Cervical back: Neck supple.      Right lower leg: Edema present.      Left lower leg: Edema present.      Comments: Generalized weakness   Neurological:      Mental Status: He is alert.      Comments: Tremor   Psychiatric:         Mood and Affect: Mood normal.         Behavior: Behavior is cooperative.         Assessment/Plan  Problem List Items Addressed This Visit       Asthma     Stable  Monitor         CHF (congestive heart failure) (CMS/HCC)     Stable  Continue diuretic  Low-sodium diet  Monitor weight         Hypertension, essential     Controlled with current medication regime  Continue antihypertensives  Continue to monitor blood pressure         Parkinson's disease (CMS/HCC)     Continue therapy         Paroxysmal atrial fibrillation (CMS/[facility])     Anticoagulant  Monitor for bleeding         Weakness - Primary     Improving  Continue therapy          Medications, treatments, and labs reviewed  Continue medications and treatments as listed in [facility]    TERENCE Dave      Electronically Signed By: TERENCE Dave   4/14/23  5:09 PM

## 2023-04-05 ENCOUNTER — NURSING HOME VISIT (OUTPATIENT)
Dept: POST ACUTE CARE | Facility: EXTERNAL LOCATION | Age: 78
End: 2023-04-05
Payer: COMMERCIAL

## 2023-04-05 DIAGNOSIS — R53.1 WEAKNESS: ICD-10-CM

## 2023-04-05 PROCEDURE — 99309 SBSQ NF CARE MODERATE MDM 30: CPT | Performed by: INTERNAL MEDICINE

## 2023-04-05 NOTE — PROGRESS NOTES
PROGRESS NOTE    Subjective   Chief complaint: Shashi Gutierrez is a 77 y.o. male who is an acute skilled patient being seen and evaluated for weakness    HPI:  3/27/2023 patient admitted to skilled nursing facility for therapy due to weakness after recent hospitalization for difficulty walking.  Patient was experiencing difficulty walking for 2 days due to leg weakness so he went to the emergency room.  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  Neurology recommended physical therapy and fall precautions.  Patient was discharged to skilled nursing facility for therapy and to continue medical management.  Today patient endorses difficulty moving his bowels.  He denies abdominal pain nausea and vomiting.    3/28/2023 patient seen and examined at bedside working with therapy.  Patient states he is feeling okay.  He is able to walk 200 feet with assist according to the therapist.  No new concerns today.    3/30/2023 patient is at skilled nursing facility for therapy due to generalized weakness.  He continues to work towards goals.  He is able to walk 200 feet with assist.  Denies constitutional symptoms.    3/31/23 Patient working in therapy due to weakness. He is ambulating up to 200' with CGA. No new issues or concerns at this time. Denies SOB.     4/3/23 Patient feels well and is working on ambulation strengthening and.  He feels that he is getting stronger.  He is walking about 200 feet with contact-guard assist with walker.  Patient has no new concerns today.  He denies constitutional symptoms.    4/4/23 uneventful night.  No new concerns today.  Patient states he is feeling better.  Continues to work towards goals in therapy.      Objective   Vital signs: 110/62    Physical Exam  Constitutional:       General: He is not in acute  distress.  Eyes:      Extraocular Movements: Extraocular movements intact.   Cardiovascular:      Rate and Rhythm: Regular rhythm.   Pulmonary:      Effort: Pulmonary effort is normal.      Breath sounds: Normal breath sounds.   Abdominal:      General: Bowel sounds are normal.      Palpations: Abdomen is soft.   Musculoskeletal:      Cervical back: Neck supple.      Right lower leg: Edema present.      Left lower leg: Edema present.      Comments: Generalized weakness   Neurological:      Mental Status: He is alert.      Comments: Tremor   Psychiatric:         Mood and Affect: Mood normal.         Behavior: Behavior is cooperative.         Assessment/Plan   Problem List Items Addressed This Visit       Asthma     Stable  Monitor         CHF (congestive heart failure) (CMS/HCC)     Stable  Continue diuretic  Low-sodium diet  Monitor weight         Hypertension, essential     Controlled with current medication regime  Continue antihypertensives  Continue to monitor blood pressure         Parkinson's disease (CMS/HCC)     Continue therapy         Paroxysmal atrial fibrillation (CMS/HCC)     Anticoagulant  Monitor for bleeding         Weakness - Primary     Improving  Continue therapy          Medications, treatments, and labs reviewed  Continue medications and treatments as listed in P[facility]    Chelsie Gallegos, APRN-CNP

## 2023-04-05 NOTE — LETTER
Patient: Shashi Gutierrez  : 1945    Encounter Date: 2023    PROGRESS NOTE    Subjective  Chief complaint: Shashi Gutierrez is a 77 y.o. male who is an acute skilled patient being seen and evaluated for weakness    HPI:  3/27/2023 patient admitted to skilled nursing facility for therapy due to weakness after recent hospitalization for difficulty walking.  Patient was experiencing difficulty walking for 2 days due to leg weakness so he went to the emergency room.  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  Neurology recommended physical therapy and fall precautions.  Patient was discharged to skilled nursing facility for therapy and to continue medical management.  Today patient endorses difficulty moving his bowels.  He denies abdominal pain nausea and vomiting.    3/28/2023 patient seen and examined at bedside working with therapy.  Patient states he is feeling okay.  He is able to walk 200 feet with assist according to the therapist.  No new concerns today.    3/30/2023 patient is at skilled nursing facility for therapy due to generalized weakness.  He continues to work towards goals.  He is able to walk 200 feet with assist.  Denies constitutional symptoms.    3/31/23 Patient working in therapy due to weakness. He is ambulating up to 200' with CGA. No new issues or concerns at this time. Denies SOB.     23  patient continues to improve with therapy.  Denies shortness of breath he is ambulating further distances with  contact-guard assist.  No new concerns or complaints.  No acute distress.      Objective  Vital signs:   158/89, 63, 18, 94%    Physical Exam  Constitutional:       General: He is not in acute distress.  Eyes:      Extraocular Movements: Extraocular movements intact.   Cardiovascular:      Rate and Rhythm: Regular  rhythm.   Pulmonary:      Effort: Pulmonary effort is normal.      Breath sounds: Normal breath sounds.   Abdominal:      General: Bowel sounds are normal.      Palpations: Abdomen is soft.   Musculoskeletal:      Cervical back: Neck supple.      Right lower leg: No edema.      Left lower leg: No edema.   Neurological:      Mental Status: He is alert.   Psychiatric:         Mood and Affect: Mood normal.         Behavior: Behavior is cooperative.         Assessment/Plan  Problem List Items Addressed This Visit          Other    Weakness     Improving  Continue therapy        Medications, treatments, and labs reviewed  Continue medications and treatments as listed in [facility]    Scribe Attestation  By signing my name below, IAndree Scribe   attest that this documentation has been prepared under the direction and in the presence of Tiburcio Dueñas MD.    Provider Attestation - Scribe documentation  All medical record entries made by the Scribe were at my direction and personally dictated by me. I have reviewed the chart and agree that the record accurately reflects my personal performance of the history, physical exam, discussion and plan.      Electronically Signed By: Tiburcio Dueñas MD   4/6/23  5:19 PM

## 2023-04-06 ENCOUNTER — NURSING HOME VISIT (OUTPATIENT)
Dept: POST ACUTE CARE | Facility: EXTERNAL LOCATION | Age: 78
End: 2023-04-06
Payer: COMMERCIAL

## 2023-04-06 DIAGNOSIS — R53.1 WEAKNESS: Primary | ICD-10-CM

## 2023-04-06 DIAGNOSIS — I10 HYPERTENSION, ESSENTIAL: ICD-10-CM

## 2023-04-06 DIAGNOSIS — G20.A1 PARKINSON'S DISEASE (MULTI): ICD-10-CM

## 2023-04-06 DIAGNOSIS — I48.0 PAROXYSMAL ATRIAL FIBRILLATION (MULTI): ICD-10-CM

## 2023-04-06 DIAGNOSIS — J84.9 INTERSTITIAL LUNG DISEASE (MULTI): ICD-10-CM

## 2023-04-06 DIAGNOSIS — I50.9 CHRONIC CONGESTIVE HEART FAILURE, UNSPECIFIED HEART FAILURE TYPE (MULTI): ICD-10-CM

## 2023-04-06 PROCEDURE — 99309 SBSQ NF CARE MODERATE MDM 30: CPT | Performed by: NURSE PRACTITIONER

## 2023-04-06 NOTE — ASSESSMENT & PLAN NOTE
Controlled with current medication regime  Continue antihypertensives  Continue to monitor blood pressure

## 2023-04-06 NOTE — PROGRESS NOTES
PROGRESS NOTE    Subjective   Chief complaint: Shashi Gutierrez is a 77 y.o. male who is a acute skilled care patient being seen and evaluated for weakness.    HPI:  3/27/2023 patient admitted to skilled nursing facility for therapy due to weakness after recent hospitalization for difficulty walking.  Patient was experiencing difficulty walking for 2 days due to leg weakness so he went to the emergency room.  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  Neurology recommended physical therapy and fall precautions.  Patient was discharged to skilled nursing facility for therapy and to continue medical management.  Today patient endorses difficulty moving his bowels.  He denies abdominal pain nausea and vomiting.    3/28/2023 patient seen and examined at bedside working with therapy.  Patient states he is feeling okay.  He is able to walk 200 feet with assist according to the therapist.  No new concerns today.    3/30/2023 patient is at skilled nursing facility for therapy due to generalized weakness.  He continues to work towards goals.  He is able to walk 200 feet with assist.  Denies constitutional symptoms.    3/31/23 Patient working in therapy due to weakness. He is ambulating up to 200' with CGA. No new issues or concerns at this time. Denies SOB.     4/5/23  patient continues to improve with therapy.  Denies shortness of breath he is ambulating further distances with  contact-guard assist.  No new concerns or complaints.  No acute distress.    4/6/23  therapy has been working with the patient to improve strength and endurance with ADLs, transfers, and mobility.  Patient continues to work toward goals.  Patient is stable and has no new complaints.  Nursing staff voices no new concerns today.        Objective   Vital signs: 18, 136/82, 97.2, 75,  95%  Physical Exam  Constitutional:       General: He is not in acute distress.  Eyes:      Extraocular Movements: Extraocular movements intact.   Cardiovascular:      Rate and Rhythm: Regular rhythm.   Pulmonary:      Effort: Pulmonary effort is normal.      Breath sounds: Normal breath sounds.   Abdominal:      General: Bowel sounds are normal.      Palpations: Abdomen is soft.   Musculoskeletal:      Cervical back: Neck supple.      Right lower leg: Edema present.      Left lower leg: Edema present.      Comments: Generalized weakness   Neurological:      Mental Status: He is alert.      Comments: Tremor   Psychiatric:         Mood and Affect: Mood normal.         Behavior: Behavior is cooperative.         Assessment/Plan   Problem List Items Addressed This Visit       CHF (congestive heart failure) (CMS/[facility])     Stable  Continue diuretic  Low-sodium diet  Monitor weight         Hypertension, essential     Controlled with current medication regime  Continue antihypertensives  Continue to monitor blood pressure         Interstitial lung disease (CMS/[facility])     Stable  On RA         Parkinson's disease (CMS/[facility])     Continue therapy         Paroxysmal atrial fibrillation (CMS/[facility])     HR controlled  Anticoagulant  Monitor for bleeding         Weakness - Primary     Improving  Continue with therapy          Medications, treatments, and labs reviewed  Continue medications and treatments as listed in [facility]    Scribe Attestation  IDi Scribe   attest that this documentation has been prepared under the direction and in the presence of TERENCE Dave    Provider Attestation - Scribe documentation  All medical record entries made by the Scribe were at my direction and personally dictated by me. I have reviewed the chart and agree that the record accurately reflects my personal performance of the history, physical exam, discussion and plan.   TERENCE Dave

## 2023-04-06 NOTE — ASSESSMENT & PLAN NOTE
>>ASSESSMENT AND PLAN FOR PAROXYSMAL ATRIAL FIBRILLATION (CMS/HCC) WRITTEN ON 4/6/2023  4:30 PM BY ASHLEY SORTO-MARCIAL    Anticoagulant  Monitor for bleeding

## 2023-04-06 NOTE — ASSESSMENT & PLAN NOTE
>>ASSESSMENT AND PLAN FOR PAROXYSMAL ATRIAL FIBRILLATION (CMS/HCC) WRITTEN ON 4/6/2023  4:33 PM BY ASHLEY SORTO-MARCIAL    Anticoagulant  Monitor for bleeding

## 2023-04-06 NOTE — LETTER
Patient: Shashi Gutierrez  : 1945    Encounter Date: 2023    PROGRESS NOTE    Subjective  Chief complaint: Shashi Gutierrez is a 77 y.o. male who is a acute skilled care patient being seen and evaluated for weakness.    HPI:  3/27/2023 patient admitted to skilled nursing facility for therapy due to weakness after recent hospitalization for difficulty walking.  Patient was experiencing difficulty walking for 2 days due to leg weakness so he went to the emergency room.  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  Neurology recommended physical therapy and fall precautions.  Patient was discharged to skilled nursing facility for therapy and to continue medical management.  Today patient endorses difficulty moving his bowels.  He denies abdominal pain nausea and vomiting.    3/28/2023 patient seen and examined at bedside working with therapy.  Patient states he is feeling okay.  He is able to walk 200 feet with assist according to the therapist.  No new concerns today.    3/30/2023 patient is at skilled nursing facility for therapy due to generalized weakness.  He continues to work towards goals.  He is able to walk 200 feet with assist.  Denies constitutional symptoms.    3/31/23 Patient working in therapy due to weakness. He is ambulating up to 200' with CGA. No new issues or concerns at this time. Denies SOB.     23  patient continues to improve with therapy.  Denies shortness of breath he is ambulating further distances with  contact-guard assist.  No new concerns or complaints.  No acute distress.    23  therapy has been working with the patient to improve strength and endurance with ADLs, transfers, and mobility.  Patient continues to work toward goals.  Patient is stable and has no new complaints.  Nursing staff voices no new  concerns today.        Objective  Vital signs: 18, 136/82, 97.2, 75, 95%  Physical Exam  Constitutional:       General: He is not in acute distress.  Eyes:      Extraocular Movements: Extraocular movements intact.   Cardiovascular:      Rate and Rhythm: Regular rhythm.   Pulmonary:      Effort: Pulmonary effort is normal.      Breath sounds: Normal breath sounds.   Abdominal:      General: Bowel sounds are normal.      Palpations: Abdomen is soft.   Musculoskeletal:      Cervical back: Neck supple.      Right lower leg: Edema present.      Left lower leg: Edema present.      Comments: Generalized weakness   Neurological:      Mental Status: He is alert.      Comments: Tremor   Psychiatric:         Mood and Affect: Mood normal.         Behavior: Behavior is cooperative.         Assessment/Plan  Problem List Items Addressed This Visit       CHF (congestive heart failure) (CMS/HCC)     Stable  Continue diuretic  Low-sodium diet  Monitor weight         Hypertension, essential     Controlled with current medication regime  Continue antihypertensives  Continue to monitor blood pressure         Interstitial lung disease (CMS/HCC)     Stable  On RA         Parkinson's disease (CMS/HCC)     Continue therapy         Paroxysmal atrial fibrillation (CMS/HCC)     HR controlled  Anticoagulant  Monitor for bleeding         Weakness - Primary     Improving  Continue with therapy          Medications, treatments, and labs reviewed  Continue medications and treatments as listed in [facility]    Scribe Attestation  I, Ayse Diallo   attest that this documentation has been prepared under the direction and in the presence of ASHLEY Dave-CNP    Provider Attestation - Scribe documentation  All medical record entries made by the Scribe were at my direction and personally dictated by me. I have reviewed the chart and agree that the record accurately reflects my personal performance of the history, physical exam, discussion and  plan.   TERENCE Dave        Electronically Signed By: TERENCE Dave   4/14/23  6:49 PM

## 2023-04-06 NOTE — PROGRESS NOTES
PROGRESS NOTE    Subjective   Chief complaint: Shashi Gutierrez is a 77 y.o. male who is an acute skilled patient being seen and evaluated for weakness    HPI:  3/27/2023 patient admitted to skilled nursing facility for therapy due to weakness after recent hospitalization for difficulty walking.  Patient was experiencing difficulty walking for 2 days due to leg weakness so he went to the emergency room.  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  Neurology recommended physical therapy and fall precautions.  Patient was discharged to skilled nursing facility for therapy and to continue medical management.  Today patient endorses difficulty moving his bowels.  He denies abdominal pain nausea and vomiting.    3/28/2023 patient seen and examined at bedside working with therapy.  Patient states he is feeling okay.  He is able to walk 200 feet with assist according to the therapist.  No new concerns today.    3/30/2023 patient is at skilled nursing facility for therapy due to generalized weakness.  He continues to work towards goals.  He is able to walk 200 feet with assist.  Denies constitutional symptoms.    3/31/23 Patient working in therapy due to weakness. He is ambulating up to 200' with CGA. No new issues or concerns at this time. Denies SOB.     4/5/23  patient continues to improve with therapy.  Denies shortness of breath he is ambulating further distances with  contact-guard assist.  No new concerns or complaints.  No acute distress.      Objective   Vital signs:   158/89, 63, 18, 94%    Physical Exam  Constitutional:       General: He is not in acute distress.  Eyes:      Extraocular Movements: Extraocular movements intact.   Cardiovascular:      Rate and Rhythm: Regular rhythm.   Pulmonary:      Effort: Pulmonary effort is normal.       Breath sounds: Normal breath sounds.   Abdominal:      General: Bowel sounds are normal.      Palpations: Abdomen is soft.   Musculoskeletal:      Cervical back: Neck supple.      Right lower leg: No edema.      Left lower leg: No edema.   Neurological:      Mental Status: He is alert.   Psychiatric:         Mood and Affect: Mood normal.         Behavior: Behavior is cooperative.         Assessment/Plan   Problem List Items Addressed This Visit          Other    Weakness     Improving  Continue therapy        Medications, treatments, and labs reviewed  Continue medications and treatments as listed in [facility]    Scribe Attestation  By signing my name below, I, Shaniqua Raymundoibnirali   attest that this documentation has been prepared under the direction and in the presence of Tiburcio Dueñas MD.    Provider Attestation - Scribe documentation  All medical record entries made by the Scribe were at my direction and personally dictated by me. I have reviewed the chart and agree that the record accurately reflects my personal performance of the history, physical exam, discussion and plan.

## 2023-04-07 ENCOUNTER — NURSING HOME VISIT (OUTPATIENT)
Dept: POST ACUTE CARE | Facility: EXTERNAL LOCATION | Age: 78
End: 2023-04-07
Payer: COMMERCIAL

## 2023-04-07 DIAGNOSIS — M48.00 SPINAL STENOSIS, UNSPECIFIED SPINAL REGION: ICD-10-CM

## 2023-04-07 DIAGNOSIS — R53.1 WEAKNESS: ICD-10-CM

## 2023-04-07 PROCEDURE — 99308 SBSQ NF CARE LOW MDM 20: CPT | Performed by: INTERNAL MEDICINE

## 2023-04-07 NOTE — LETTER
Patient: Shashi Gutierrez  : 1945    Encounter Date: 2023    PROGRESS NOTE    Subjective  Chief complaint: Shashi Gutierrez is a 77 y.o. male who is an acute skilled patient being seen and evaluated for weakness    HPI:  3/27/2023 patient admitted to skilled nursing facility for therapy due to weakness after recent hospitalization for difficulty walking.  Patient was experiencing difficulty walking for 2 days due to leg weakness so he went to the emergency room.  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  Neurology recommended physical therapy and fall precautions.  Patient was discharged to skilled nursing facility for therapy and to continue medical management.  Today patient endorses difficulty moving his bowels.  He denies abdominal pain nausea and vomiting.    3/28/2023 patient seen and examined at bedside working with therapy.  Patient states he is feeling okay.  He is able to walk 200 feet with assist according to the therapist.  No new concerns today.    3/30/2023 patient is at skilled nursing facility for therapy due to generalized weakness.  He continues to work towards goals.  He is able to walk 200 feet with assist.  Denies constitutional symptoms.    3/31/23 Patient working in therapy due to weakness. He is ambulating up to 200' with CGA. No new issues or concerns at this time. Denies SOB.     23  patient continues to improve with therapy.  Denies shortness of breath he is ambulating further distances with  contact-guard assist.  No new concerns or complaints.  No acute distress.    23  therapy has been working with the patient to improve strength and endurance with ADLs, transfers, and mobility.  Patient continues to work toward goals.  Patient is stable and has no new complaints.  Nursing staff voices no new  concerns today.    4/7/23   Patient is working in therapy.  Patient requires minimal assistance for transfers and ADLs.  He is doing well and has no new issues or concerns today.  No acute distress. Denies pain.       Objective  Vital signs: 150/89, 79, 18, 93%    Physical Exam  Constitutional:       General: He is not in acute distress.  Eyes:      Extraocular Movements: Extraocular movements intact.   Cardiovascular:      Rate and Rhythm: Normal rate and regular rhythm.   Pulmonary:      Effort: Pulmonary effort is normal.      Breath sounds: Normal breath sounds.   Abdominal:      General: Bowel sounds are normal.      Palpations: Abdomen is soft.   Musculoskeletal:         General: Normal range of motion.      Cervical back: Normal range of motion and neck supple.      Right lower leg: No edema.      Left lower leg: No edema.   Neurological:      Mental Status: He is alert.   Psychiatric:         Mood and Affect: Mood normal.         Behavior: Behavior is cooperative.         Assessment/Plan  Problem List Items Addressed This Visit          Nervous    Spinal stenosis     Therapy            Other    Weakness     Improving  Continue therapy          Medications, treatments, and labs reviewed  Continue medications and treatments as listed in [facility]    Scribe Attestation  By signing my name below, IAndree, Scribe   attest that this documentation has been prepared under the direction and in the presence of Tiburcio Dueñas MD.    Provider Attestation - Scribe documentation  All medical record entries made by the Scribe were at my direction and personally dictated by me. I have reviewed the chart and agree that the record accurately reflects my personal performance of the history, physical exam, discussion and plan.      Electronically Signed By: Tiburcio Dueñas MD   4/10/23  7:25 PM

## 2023-04-09 NOTE — ASSESSMENT & PLAN NOTE
>>ASSESSMENT AND PLAN FOR PAROXYSMAL ATRIAL FIBRILLATION (CMS/HCC) WRITTEN ON 4/9/2023  4:30 PM BY ASHLEY SORTO-MARCIAL    HR controlled  Anticoagulant  Monitor for bleeding

## 2023-04-10 PROBLEM — M48.00 SPINAL STENOSIS: Status: ACTIVE | Noted: 2023-04-10

## 2023-04-10 NOTE — PROGRESS NOTES
PROGRESS NOTE    Subjective   Chief complaint: Shashi Gutierrez is a 77 y.o. male who is an acute skilled patient being seen and evaluated for weakness    HPI:  3/27/2023 patient admitted to skilled nursing facility for therapy due to weakness after recent hospitalization for difficulty walking.  Patient was experiencing difficulty walking for 2 days due to leg weakness so he went to the emergency room.  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  Neurology recommended physical therapy and fall precautions.  Patient was discharged to skilled nursing facility for therapy and to continue medical management.  Today patient endorses difficulty moving his bowels.  He denies abdominal pain nausea and vomiting.    3/28/2023 patient seen and examined at bedside working with therapy.  Patient states he is feeling okay.  He is able to walk 200 feet with assist according to the therapist.  No new concerns today.    3/30/2023 patient is at skilled nursing facility for therapy due to generalized weakness.  He continues to work towards goals.  He is able to walk 200 feet with assist.  Denies constitutional symptoms.    3/31/23 Patient working in therapy due to weakness. He is ambulating up to 200' with CGA. No new issues or concerns at this time. Denies SOB.     4/5/23  patient continues to improve with therapy.  Denies shortness of breath he is ambulating further distances with  contact-guard assist.  No new concerns or complaints.  No acute distress.    4/6/23  therapy has been working with the patient to improve strength and endurance with ADLs, transfers, and mobility.  Patient continues to work toward goals.  Patient is stable and has no new complaints.  Nursing staff voices no new concerns today.    4/7/23   Patient is working in therapy.  Patient requires  minimal assistance for transfers and ADLs.  He is doing well and has no new issues or concerns today.  No acute distress. Denies pain.       Objective   Vital signs: 150/89, 79, 18, 93%    Physical Exam  Constitutional:       General: He is not in acute distress.  Eyes:      Extraocular Movements: Extraocular movements intact.   Cardiovascular:      Rate and Rhythm: Normal rate and regular rhythm.   Pulmonary:      Effort: Pulmonary effort is normal.      Breath sounds: Normal breath sounds.   Abdominal:      General: Bowel sounds are normal.      Palpations: Abdomen is soft.   Musculoskeletal:         General: Normal range of motion.      Cervical back: Normal range of motion and neck supple.      Right lower leg: No edema.      Left lower leg: No edema.   Neurological:      Mental Status: He is alert.   Psychiatric:         Mood and Affect: Mood normal.         Behavior: Behavior is cooperative.         Assessment/Plan   Problem List Items Addressed This Visit          Nervous    Spinal stenosis     Therapy            Other    Weakness     Improving  Continue therapy          Medications, treatments, and labs reviewed  Continue medications and treatments as listed in [facility]    Scribe Attestation  By signing my name below, IAndree Scribnirali   attest that this documentation has been prepared under the direction and in the presence of Tiburcio Dueñas MD.    Provider Attestation - Scribe documentation  All medical record entries made by the Scribe were at my direction and personally dictated by me. I have reviewed the chart and agree that the record accurately reflects my personal performance of the history, physical exam, discussion and plan.

## 2023-04-14 NOTE — ASSESSMENT & PLAN NOTE
>>ASSESSMENT AND PLAN FOR PAROXYSMAL ATRIAL FIBRILLATION (CMS/HCC) WRITTEN ON 4/14/2023  5:46 PM BY ASHLEY SORTO-MARCIAL    Anticoagulant  Monitor for bleeding

## 2023-04-14 NOTE — PROGRESS NOTES
PROGRESS NOTE    Subjective   Chief complaint: Shashi Gutierrez is a 77 y.o. male who is an acute skilled patient being seen and evaluated for weakness    HPI:  3/27/2023 patient admitted to skilled nursing facility for therapy due to weakness after recent hospitalization for difficulty walking.  Patient was experiencing difficulty walking for 2 days due to leg weakness so he went to the emergency room.  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  Neurology recommended physical therapy and fall precautions.  Patient was discharged to skilled nursing facility for therapy and to continue medical management.  Today patient endorses difficulty moving his bowels.  He denies abdominal pain nausea and vomiting.    3/28/2023 patient seen and examined at bedside working with therapy.  Patient states he is feeling okay.  He is able to walk 200 feet with assist according to the therapist.  No new concerns today.    3/30/2023 patient is at skilled nursing facility for therapy due to generalized weakness.  He continues to work towards goals.  He is able to walk 200 feet with assist.  Denies constitutional symptoms.    3/31/23 Patient working in therapy due to weakness. He is ambulating up to 200' with CGA. No new issues or concerns at this time. Denies SOB.     4/3/23 Patient feels well and is working on ambulation strengthening and.  He feels that he is getting stronger.  He is walking about 200 feet with contact-guard assist with walker.  Patient has no new concerns today.  He denies constitutional symptoms.      Objective   Vital signs: 139/73    Physical Exam  Constitutional:       General: He is not in acute distress.  Eyes:      Extraocular Movements: Extraocular movements intact.   Cardiovascular:      Rate and Rhythm: Regular rhythm.   Pulmonary:       Effort: Pulmonary effort is normal.      Breath sounds: Normal breath sounds.   Abdominal:      General: Bowel sounds are normal.      Palpations: Abdomen is soft.   Musculoskeletal:      Cervical back: Neck supple.      Right lower leg: Edema present.      Left lower leg: Edema present.      Comments: Generalized weakness   Neurological:      Mental Status: He is alert.      Comments: Tremor   Psychiatric:         Mood and Affect: Mood normal.         Behavior: Behavior is cooperative.         Assessment/Plan   Problem List Items Addressed This Visit       Asthma     Stable  Monitor         CHF (congestive heart failure) (CMS/HCC)     Stable  Continue diuretic  Low-sodium diet  Monitor weight         Hypertension, essential     Controlled with current medication regime  Continue antihypertensives  Continue to monitor blood pressure         Paroxysmal atrial fibrillation (CMS/HCC)     Anticoagulant  Monitor for bleeding         Weakness - Primary     Improving  Continue with therapy        Medications, treatments, and labs reviewed  Continue medications and treatments as listed in [facility]    ASHLEY Dave-CNP

## 2023-04-28 DIAGNOSIS — R25.1 TREMOR, UNSPECIFIED: ICD-10-CM

## 2023-04-28 DIAGNOSIS — I10 HYPERTENSION, ESSENTIAL: ICD-10-CM

## 2023-04-28 DIAGNOSIS — I50.9 CHRONIC CONGESTIVE HEART FAILURE, UNSPECIFIED HEART FAILURE TYPE (MULTI): ICD-10-CM

## 2023-04-28 DIAGNOSIS — E78.5 HYPERLIPIDEMIA, UNSPECIFIED HYPERLIPIDEMIA TYPE: ICD-10-CM

## 2023-04-28 DIAGNOSIS — R53.1 WEAKNESS: ICD-10-CM

## 2023-04-28 DIAGNOSIS — J45.909 MODERATE ASTHMA, UNSPECIFIED WHETHER COMPLICATED, UNSPECIFIED WHETHER PERSISTENT (HHS-HCC): ICD-10-CM

## 2023-04-28 RX ORDER — ALBUTEROL SULFATE 90 UG/1
2 AEROSOL, METERED RESPIRATORY (INHALATION) EVERY 6 HOURS PRN
Qty: 18 G | Refills: 0 | Status: CANCELLED | OUTPATIENT
Start: 2023-04-28

## 2023-04-28 RX ORDER — FUROSEMIDE 20 MG/1
20 TABLET ORAL DAILY
COMMUNITY
Start: 2023-03-23 | End: 2023-05-05 | Stop reason: SDUPTHER

## 2023-04-28 RX ORDER — ATORVASTATIN CALCIUM 20 MG/1
20 TABLET, FILM COATED ORAL DAILY
COMMUNITY
Start: 2023-03-23 | End: 2023-05-05 | Stop reason: SDUPTHER

## 2023-04-28 RX ORDER — CARBIDOPA AND LEVODOPA 25; 100 MG/1; MG/1
TABLET ORAL 2 TIMES DAILY
COMMUNITY
End: 2023-05-05 | Stop reason: SDUPTHER

## 2023-04-28 RX ORDER — ATORVASTATIN CALCIUM 20 MG/1
20 TABLET, FILM COATED ORAL DAILY
Qty: 30 TABLET | Refills: 0 | Status: CANCELLED | OUTPATIENT
Start: 2023-04-28

## 2023-04-28 RX ORDER — FUROSEMIDE 20 MG/1
20 TABLET ORAL DAILY
Qty: 30 TABLET | Refills: 0 | Status: CANCELLED | OUTPATIENT
Start: 2023-04-28

## 2023-04-28 RX ORDER — ACETAMINOPHEN 325 MG/1
325 TABLET ORAL EVERY 4 HOURS PRN
COMMUNITY
Start: 2023-03-23

## 2023-04-28 RX ORDER — DOCUSATE SODIUM 100 MG/1
100 CAPSULE, LIQUID FILLED ORAL DAILY
COMMUNITY

## 2023-04-28 RX ORDER — ALBUTEROL SULFATE 90 UG/1
2 AEROSOL, METERED RESPIRATORY (INHALATION) EVERY 6 HOURS PRN
COMMUNITY
End: 2023-05-05 | Stop reason: SDUPTHER

## 2023-04-28 RX ORDER — METOPROLOL SUCCINATE 50 MG/1
50 TABLET, EXTENDED RELEASE ORAL DAILY
Qty: 30 TABLET | Refills: 0 | Status: CANCELLED | OUTPATIENT
Start: 2023-04-28

## 2023-04-28 RX ORDER — CARBIDOPA AND LEVODOPA 25; 100 MG/1; MG/1
1 TABLET ORAL 2 TIMES DAILY
Qty: 60 TABLET | Refills: 0 | Status: CANCELLED | OUTPATIENT
Start: 2023-04-28

## 2023-04-28 RX ORDER — FERROUS SULFATE 325(65) MG
1 TABLET ORAL DAILY
Status: ON HOLD | COMMUNITY
End: 2023-12-12 | Stop reason: ALTCHOICE

## 2023-04-28 RX ORDER — POTASSIUM CHLORIDE 750 MG/1
10 TABLET, FILM COATED, EXTENDED RELEASE ORAL DAILY
Qty: 30 TABLET | Refills: 0 | Status: CANCELLED | OUTPATIENT
Start: 2023-04-28

## 2023-04-28 RX ORDER — SENNOSIDES 8.6 MG/1
1 TABLET ORAL 2 TIMES DAILY
COMMUNITY
End: 2023-06-02 | Stop reason: SDUPTHER

## 2023-04-28 RX ORDER — POTASSIUM CHLORIDE 750 MG/1
10 TABLET, FILM COATED, EXTENDED RELEASE ORAL DAILY
COMMUNITY
End: 2023-05-05 | Stop reason: SDUPTHER

## 2023-05-05 ENCOUNTER — OFFICE VISIT (OUTPATIENT)
Dept: PRIMARY CARE | Facility: CLINIC | Age: 78
End: 2023-05-05
Payer: COMMERCIAL

## 2023-05-05 VITALS
SYSTOLIC BLOOD PRESSURE: 126 MMHG | RESPIRATION RATE: 12 BRPM | BODY MASS INDEX: 30.35 KG/M2 | OXYGEN SATURATION: 95 % | HEART RATE: 70 BPM | WEIGHT: 212 LBS | DIASTOLIC BLOOD PRESSURE: 58 MMHG | HEIGHT: 70 IN

## 2023-05-05 DIAGNOSIS — I48.91 ATRIAL FIBRILLATION, UNSPECIFIED TYPE (MULTI): Chronic | ICD-10-CM

## 2023-05-05 DIAGNOSIS — L60.2 LONG TOENAIL: ICD-10-CM

## 2023-05-05 DIAGNOSIS — B35.1 ONYCHOMYCOSIS: ICD-10-CM

## 2023-05-05 DIAGNOSIS — I50.9 CHRONIC CONGESTIVE HEART FAILURE, UNSPECIFIED HEART FAILURE TYPE (MULTI): ICD-10-CM

## 2023-05-05 DIAGNOSIS — J45.909 MODERATE ASTHMA, UNSPECIFIED WHETHER COMPLICATED, UNSPECIFIED WHETHER PERSISTENT (HHS-HCC): ICD-10-CM

## 2023-05-05 DIAGNOSIS — E78.5 HYPERLIPIDEMIA, UNSPECIFIED HYPERLIPIDEMIA TYPE: ICD-10-CM

## 2023-05-05 DIAGNOSIS — G20.A1 PARKINSON'S DISEASE (MULTI): Primary | ICD-10-CM

## 2023-05-05 PROBLEM — R10.9 ABDOMINAL DISCOMFORT: Status: ACTIVE | Noted: 2023-05-05

## 2023-05-05 PROCEDURE — 3078F DIAST BP <80 MM HG: CPT | Performed by: INTERNAL MEDICINE

## 2023-05-05 PROCEDURE — 1036F TOBACCO NON-USER: CPT | Performed by: INTERNAL MEDICINE

## 2023-05-05 PROCEDURE — 99214 OFFICE O/P EST MOD 30 MIN: CPT | Performed by: INTERNAL MEDICINE

## 2023-05-05 PROCEDURE — 1159F MED LIST DOCD IN RCRD: CPT | Performed by: INTERNAL MEDICINE

## 2023-05-05 PROCEDURE — 3074F SYST BP LT 130 MM HG: CPT | Performed by: INTERNAL MEDICINE

## 2023-05-05 RX ORDER — ATORVASTATIN CALCIUM 20 MG/1
20 TABLET, FILM COATED ORAL DAILY
Qty: 90 TABLET | Refills: 3 | Status: SHIPPED | OUTPATIENT
Start: 2023-05-05

## 2023-05-05 RX ORDER — CARVEDILOL 6.25 MG/1
6.25 TABLET ORAL 2 TIMES DAILY
COMMUNITY
Start: 2023-03-23 | End: 2023-05-05 | Stop reason: SDUPTHER

## 2023-05-05 RX ORDER — CARBIDOPA AND LEVODOPA 25; 100 MG/1; MG/1
1 TABLET ORAL 2 TIMES DAILY
Qty: 90 TABLET | Refills: 3 | Status: SHIPPED | OUTPATIENT
Start: 2023-05-05 | End: 2023-06-02 | Stop reason: SDUPTHER

## 2023-05-05 RX ORDER — CARVEDILOL 6.25 MG/1
6.25 TABLET ORAL
Qty: 90 TABLET | Refills: 3 | Status: SHIPPED | OUTPATIENT
Start: 2023-05-05 | End: 2023-12-14 | Stop reason: HOSPADM

## 2023-05-05 RX ORDER — POTASSIUM CHLORIDE 750 MG/1
10 TABLET, FILM COATED, EXTENDED RELEASE ORAL DAILY
Qty: 90 TABLET | Refills: 3 | Status: SHIPPED | OUTPATIENT
Start: 2023-05-05

## 2023-05-05 RX ORDER — ALBUTEROL SULFATE 90 UG/1
2 AEROSOL, METERED RESPIRATORY (INHALATION) EVERY 6 HOURS PRN
Qty: 18 G | Refills: 2 | Status: SHIPPED | OUTPATIENT
Start: 2023-05-05

## 2023-05-05 RX ORDER — FUROSEMIDE 20 MG/1
20 TABLET ORAL DAILY
Qty: 90 TABLET | Refills: 3 | Status: SHIPPED | OUTPATIENT
Start: 2023-05-05 | End: 2023-06-02 | Stop reason: SDUPTHER

## 2023-05-05 NOTE — PROGRESS NOTES
"Subjective   Chief complaint: Shashi Gutierrez is a 77 y.o. male who presents for Med Refill (Pt is here for mediation refill and would like hospital bed ).    HPI:  Patient presents for follow up for med refill and is requesting an order for a hospital bed. Brought in by son who says patient has had a fungal infection of the toenails on both feet for a while. Patient states he feels like he has a pressure sore near tailbone. Patient has been missing doses of all of his medications, but son says he has them under control now.        Objective   /58   Pulse 70   Resp 12   Ht 1.778 m (5' 10\")   Wt 96.2 kg (212 lb)   SpO2 95%   BMI 30.42 kg/m²   Physical Exam  Vitals reviewed.   Constitutional:       Appearance: Normal appearance.   HENT:      Head: Normocephalic and atraumatic.   Cardiovascular:      Rate and Rhythm: Normal rate and regular rhythm.   Pulmonary:      Effort: Pulmonary effort is normal.      Breath sounds: Normal breath sounds.   Abdominal:      General: Bowel sounds are normal.      Palpations: Abdomen is soft.   Musculoskeletal:      Cervical back: Neck supple.      Right lower leg: Edema present.      Left lower leg: Edema present.   Skin:     General: Skin is warm and dry.   Neurological:      General: No focal deficit present.      Mental Status: He is alert.   Psychiatric:         Mood and Affect: Mood normal.         Behavior: Behavior is cooperative.         I have reviewed and reconciled the medication list with the patient today.   Current Outpatient Medications:     acetaminophen (Tylenol) 325 mg tablet, Take 1 tablet (325 mg) by mouth every 4 hours if needed., Disp: , Rfl:     albuterol (ProAir HFA) 90 mcg/actuation inhaler, Inhale 2 puffs every 6 hours if needed for shortness of breath or wheezing., Disp: 18 g, Rfl: 2    atorvastatin (Lipitor) 20 mg tablet, Take 1 tablet (20 mg) by mouth once daily., Disp: 90 tablet, Rfl: 3    carbidopa-levodopa (Sinemet)  mg tablet, Take 1 " tablet by mouth 2 times a day., Disp: 90 tablet, Rfl: 3    carvedilol (Coreg) 6.25 mg tablet, Take 1 tablet (6.25 mg) by mouth 2 times a day with meals., Disp: 90 tablet, Rfl: 3    docusate sodium (Colace) 100 mg capsule, Take 1 capsule (100 mg) by mouth 2 times a day., Disp: , Rfl:     ferrous sulfate 325 (65 Fe) MG tablet, Take 1 tablet (325 mg) by mouth once daily., Disp: , Rfl:     fexofenadine (Allegra) 180 mg tablet, Take 1 tablet (180 mg) by mouth 1 (one) time each day., Disp: , Rfl:     fluticasone (Flonase) 50 mcg/actuation nasal spray, Administer 2 sprays into each nostril if needed each day for rhinitis. Shake liquid before use, Disp: , Rfl:     furosemide (Lasix) 20 mg tablet, Take 1 tablet (20 mg) by mouth once daily., Disp: 90 tablet, Rfl: 3    potassium chloride CR 10 mEq ER tablet, Take 1 tablet (10 mEq) by mouth once daily., Disp: 90 tablet, Rfl: 3    rivaroxaban (Xarelto) 20 mg tablet, Take 1 tablet (20 mg) by mouth once daily., Disp: 90 tablet, Rfl: 3    sennosides (Senokot) 8.6 mg tablet, Take 1 tablet (8.6 mg) by mouth twice a day., Disp: , Rfl:     sildenafil (Viagra) 50 mg tablet, Take 1 tablet (50 mg) by mouth if needed each day for erectile dysfunction. One hour before needed, Disp: , Rfl:      Imaging:  No results found.     Labs reviewed:    Lab Results   Component Value Date    WBC 5.5 03/24/2023    HGB 12.4 (L) 03/24/2023    HCT 39.9 (L) 03/24/2023     03/24/2023    CHOL 205 (H) 12/05/2022    TRIG 92 12/05/2022    HDL 51.4 12/05/2022    ALT 12 03/24/2023    AST 33 03/24/2023     03/24/2023    K 3.9 03/24/2023     03/24/2023    CREATININE 1.00 03/24/2023    BUN 15 03/24/2023    CO2 30 03/24/2023    TSH 1.19 12/05/2022    PSA CANCELED 03/20/2023    INR 1.2 (H) 03/19/2023    HGBA1C 4.8 12/05/2022       Assessment/Plan   Problem List Items Addressed This Visit          Nervous    Parkinson's disease (CMS/HCC) - Primary    Relevant Medications    carbidopa-levodopa  (Sinemet)  mg tablet       Respiratory    Asthma    Relevant Medications    albuterol (ProAir HFA) 90 mcg/actuation inhaler       Circulatory    AF (atrial fibrillation) (CMS/[facility]) (Chronic)    Relevant Medications    carvedilol (Coreg) 6.25 mg tablet    rivaroxaban (Xarelto) 20 mg tablet    CHF (congestive heart failure) (CMS/[facility])     Stop metoprolol.         Relevant Medications    carvedilol (Coreg) 6.25 mg tablet    potassium chloride CR 10 mEq ER tablet    furosemide (Lasix) 20 mg tablet       Musculoskeletal    Onychomycosis     Bilateral feet of all toenails affected. Given referral for podiatry.            Other    Hyperlipidemia    Relevant Medications    atorvastatin (Lipitor) 20 mg tablet       Continue current medications as listed  Follow up in 2 months to recheck CBC and CMP.  Given order for hospital bed.  Apply zinc oxide twice a day to affected area of sore.

## 2023-05-26 DIAGNOSIS — N18.31 ANEMIA OF CHRONIC RENAL FAILURE, STAGE 3A (MULTI): ICD-10-CM

## 2023-05-26 DIAGNOSIS — G20.A1 PARKINSON'S DISEASE (MULTI): ICD-10-CM

## 2023-05-26 DIAGNOSIS — R25.1 OCCASIONAL TREMORS: ICD-10-CM

## 2023-05-26 DIAGNOSIS — D63.1 ANEMIA OF CHRONIC RENAL FAILURE, STAGE 3A (MULTI): ICD-10-CM

## 2023-05-26 DIAGNOSIS — R53.1 WEAKNESS: ICD-10-CM

## 2023-06-02 DIAGNOSIS — I50.9 CHRONIC CONGESTIVE HEART FAILURE, UNSPECIFIED HEART FAILURE TYPE (MULTI): ICD-10-CM

## 2023-06-02 DIAGNOSIS — G20.A1 PARKINSON'S DISEASE (MULTI): ICD-10-CM

## 2023-06-02 RX ORDER — SENNOSIDES 8.6 MG/1
1 TABLET ORAL 2 TIMES DAILY
Qty: 180 TABLET | Refills: 3 | Status: SHIPPED | OUTPATIENT
Start: 2023-06-02

## 2023-06-02 RX ORDER — FUROSEMIDE 20 MG/1
20 TABLET ORAL DAILY
Qty: 90 TABLET | Refills: 3 | Status: SHIPPED | OUTPATIENT
Start: 2023-06-02 | End: 2023-10-06 | Stop reason: HOSPADM

## 2023-06-02 RX ORDER — CARBIDOPA AND LEVODOPA 25; 100 MG/1; MG/1
1 TABLET ORAL 2 TIMES DAILY
Qty: 90 TABLET | Refills: 3 | Status: ON HOLD | OUTPATIENT
Start: 2023-06-02 | End: 2023-10-06 | Stop reason: SDUPTHER

## 2023-08-15 ENCOUNTER — NURSING HOME VISIT (OUTPATIENT)
Dept: POST ACUTE CARE | Facility: EXTERNAL LOCATION | Age: 78
End: 2023-08-15
Payer: COMMERCIAL

## 2023-08-15 DIAGNOSIS — D63.1 ANEMIA OF CHRONIC RENAL FAILURE, STAGE 3A (MULTI): ICD-10-CM

## 2023-08-15 DIAGNOSIS — R25.1 TREMOR, UNSPECIFIED: ICD-10-CM

## 2023-08-15 DIAGNOSIS — R53.1 WEAKNESS: ICD-10-CM

## 2023-08-15 DIAGNOSIS — N13.8 BPH WITH OBSTRUCTION/LOWER URINARY TRACT SYMPTOMS: ICD-10-CM

## 2023-08-15 DIAGNOSIS — N18.31 ANEMIA OF CHRONIC RENAL FAILURE, STAGE 3A (MULTI): ICD-10-CM

## 2023-08-15 DIAGNOSIS — I10 HYPERTENSION, ESSENTIAL: ICD-10-CM

## 2023-08-15 DIAGNOSIS — I25.110 CORONARY ARTERY DISEASE INVOLVING NATIVE CORONARY ARTERY OF NATIVE HEART WITH UNSTABLE ANGINA PECTORIS (MULTI): ICD-10-CM

## 2023-08-15 DIAGNOSIS — I50.9 CHRONIC CONGESTIVE HEART FAILURE, UNSPECIFIED HEART FAILURE TYPE (MULTI): ICD-10-CM

## 2023-08-15 DIAGNOSIS — N40.1 BPH WITH OBSTRUCTION/LOWER URINARY TRACT SYMPTOMS: ICD-10-CM

## 2023-08-15 DIAGNOSIS — J45.909 MODERATE ASTHMA, UNSPECIFIED WHETHER COMPLICATED, UNSPECIFIED WHETHER PERSISTENT (HHS-HCC): ICD-10-CM

## 2023-08-15 DIAGNOSIS — E78.5 HYPERLIPIDEMIA, UNSPECIFIED HYPERLIPIDEMIA TYPE: ICD-10-CM

## 2023-08-15 DIAGNOSIS — I48.91 ATRIAL FIBRILLATION, UNSPECIFIED TYPE (MULTI): Primary | Chronic | ICD-10-CM

## 2023-08-15 PROCEDURE — 99305 1ST NF CARE MODERATE MDM 35: CPT | Performed by: INTERNAL MEDICINE

## 2023-08-15 NOTE — LETTER
Patient: Shashi Gutierrez  : 1945    Encounter Date: 08/15/2023    HISTORY & PHYSICAL    Subjective  Chief complaint: Shashi Gutierrez is a 77 y.o. male who is a acute skilled care patient being seen and evaluated for multiple medical problems.  Patient presents for weakness.    HPI:  Patient was admitted to SNF after being in the hospital for edema. Patient had worsening edema of lower left extremity. Patient previously lived at home. Pt has had increased weakness. Pt was evaluated by PT and OT and discharged to SNF.        Past Medical History:   Diagnosis Date   • Encounter for general adult medical examination without abnormal findings 2019    Encounter for annual health examination   • Encounter for general adult medical examination without abnormal findings 2021    Encounter for annual health examination   • Encounter for screening for malignant neoplasm of prostate     Encounter for prostate cancer screening       Past Surgical History:   Procedure Laterality Date   • CT HEART CORONARY ANGIOGRAM  2018    CT HEART CORONARY ANGIOGRAM 2018 [facility]       Family History   Problem Relation Name Age of Onset   • No Known Problems Other         Social History     Socioeconomic History   • Marital status:      Spouse name: Not on file   • Number of children: Not on file   • Years of education: Not on file   • Highest education level: Not on file   Occupational History   • Not on file   Tobacco Use   • Smoking status: Never   • Smokeless tobacco: Never   Substance and Sexual Activity   • Alcohol use: Not Currently   • Drug use: Never   • Sexual activity: Not on file   Other Topics Concern   • Not on file   Social History Narrative   • Not on file     Social Determinants of Health     Financial Resource Strain: Not on file   Food Insecurity: Not on file   Transportation Needs: Not on file   Physical Activity: Not on file   Stress: Not on file   Social Connections: Not on file    Intimate Partner Violence: Not on file   Housing Stability: Not on file       Vital signs: 137/79, 98, 76, 18, 98%    Objective  Physical Exam  Vitals reviewed.   Constitutional:       Appearance: Normal appearance.   HENT:      Head: Normocephalic and atraumatic.   Cardiovascular:      Rate and Rhythm: Normal rate and regular rhythm.   Pulmonary:      Effort: Pulmonary effort is normal.      Breath sounds: Normal breath sounds.   Abdominal:      General: Bowel sounds are normal.      Palpations: Abdomen is soft.   Musculoskeletal:      Cervical back: Neck supple.   Skin:     General: Skin is warm and dry.   Neurological:      General: No focal deficit present.      Mental Status: He is alert.   Psychiatric:         Mood and Affect: Mood normal.         Behavior: Behavior is cooperative.         Assessment/Plan  Problem List Items Addressed This Visit       Anemia of chronic renal failure, stage 3a (CMS/[facility])    Asthma    BPH with obstruction/lower urinary tract symptoms    CHF (congestive heart failure) (CMS/[facility])    Hyperlipidemia    Hypertension, essential    Tremor, unspecified    Weakness    CAD (coronary artery disease), native coronary artery    AF (atrial fibrillation) (CMS/[facility]) - Primary (Chronic)     Medications, treatments, and labs reviewed  Continue medications and treatments as listed in [facility]    Scribe Attestation  I, Ayse Woods   attest that this documentation has been prepared under the direction and in the presence of Tiburcio Dueñas MD.    Provider Attestation - Scribe documentation  All medical record entries made by the Scribe were at my direction and personally dictated by me. I have reviewed the chart and agree that the record accurately reflects my personal performance of the history, physical exam, discussion and plan.    Tiburcio Dueñas MD          Electronically Signed By: Tiburcio Dueñas MD   8/15/23  4:11 PM

## 2023-08-15 NOTE — PROGRESS NOTES
HISTORY & PHYSICAL    Subjective   Chief complaint: Shashi Gutierrez is a 77 y.o. male who is a acute skilled care patient being seen and evaluated for multiple medical problems.  Patient presents for weakness.    HPI:  Patient was admitted to SNF after being in the hospital for edema. Patient had worsening edema of lower left extremity. Patient previously lived at home. Pt has had increased weakness. Pt was evaluated by PT and OT and discharged to SNF.        Past Medical History:   Diagnosis Date    Encounter for general adult medical examination without abnormal findings 03/04/2019    Encounter for annual health examination    Encounter for general adult medical examination without abnormal findings 01/03/2021    Encounter for annual health examination    Encounter for screening for malignant neoplasm of prostate     Encounter for prostate cancer screening       Past Surgical History:   Procedure Laterality Date    CT HEART CORONARY ANGIOGRAM  8/27/2018    CT HEART CORONARY ANGIOGRAM 8/27/2018 [facility]       Family History   Problem Relation Name Age of Onset    No Known Problems Other         Social History     Socioeconomic History    Marital status:      Spouse name: Not on file    Number of children: Not on file    Years of education: Not on file    Highest education level: Not on file   Occupational History    Not on file   Tobacco Use    Smoking status: Never    Smokeless tobacco: Never   Substance and Sexual Activity    Alcohol use: Not Currently    Drug use: Never    Sexual activity: Not on file   Other Topics Concern    Not on file   Social History Narrative    Not on file     Social Determinants of Health     Financial Resource Strain: Not on file   Food Insecurity: Not on file   Transportation Needs: Not on file   Physical Activity: Not on file   Stress: Not on file   Social Connections: Not on file   Intimate Partner Violence: Not on file   Housing Stability: Not on file       Vital  signs: 137/79, 98, 76, 18, 98%    Objective   Physical Exam  Vitals reviewed.   Constitutional:       Appearance: Normal appearance.   HENT:      Head: Normocephalic and atraumatic.   Cardiovascular:      Rate and Rhythm: Normal rate and regular rhythm.   Pulmonary:      Effort: Pulmonary effort is normal.      Breath sounds: Normal breath sounds.   Abdominal:      General: Bowel sounds are normal.      Palpations: Abdomen is soft.   Musculoskeletal:      Cervical back: Neck supple.   Skin:     General: Skin is warm and dry.   Neurological:      General: No focal deficit present.      Mental Status: He is alert.   Psychiatric:         Mood and Affect: Mood normal.         Behavior: Behavior is cooperative.         Assessment/Plan   Problem List Items Addressed This Visit       Anemia of chronic renal failure, stage 3a (CMS/[facility])    Asthma    BPH with obstruction/lower urinary tract symptoms    CHF (congestive heart failure) (CMS/[facility])    Hyperlipidemia    Hypertension, essential    Tremor, unspecified    Weakness    CAD (coronary artery disease), native coronary artery    AF (atrial fibrillation) (CMS/[facility]) - Primary (Chronic)     Medications, treatments, and labs reviewed  Continue medications and treatments as listed in [facility]    Scribe Attestation  I, Ayse Woods   attest that this documentation has been prepared under the direction and in the presence of Tiburcio Dueñas MD.    Provider Attestation - Scribe documentation  All medical record entries made by the Scribe were at my direction and personally dictated by me. I have reviewed the chart and agree that the record accurately reflects my personal performance of the history, physical exam, discussion and plan.    Tiburcio Dueñas MD

## 2023-08-18 ENCOUNTER — NURSING HOME VISIT (OUTPATIENT)
Dept: POST ACUTE CARE | Facility: EXTERNAL LOCATION | Age: 78
End: 2023-08-18
Payer: COMMERCIAL

## 2023-08-18 DIAGNOSIS — I10 HYPERTENSION, ESSENTIAL: ICD-10-CM

## 2023-08-18 DIAGNOSIS — I50.9 CHRONIC CONGESTIVE HEART FAILURE, UNSPECIFIED HEART FAILURE TYPE (MULTI): ICD-10-CM

## 2023-08-18 DIAGNOSIS — I48.91 ATRIAL FIBRILLATION, UNSPECIFIED TYPE (MULTI): Primary | Chronic | ICD-10-CM

## 2023-08-18 DIAGNOSIS — R53.1 WEAKNESS: ICD-10-CM

## 2023-08-18 PROCEDURE — 99309 SBSQ NF CARE MODERATE MDM 30: CPT

## 2023-08-18 NOTE — LETTER
Patient: Shashi Gutierrez  : 1945    Encounter Date: 2023    PROGRESS NOTE    Subjective  Chief complaint: Shashi Gutierrez is a 78 y.o. male who is an acute skilled patient being seen and evaluated for weakness    HPI:  3/27/2023 patient admitted to skilled nursing facility for therapy due to weakness after recent hospitalization for difficulty walking.  Patient was experiencing difficulty walking for 2 days due to leg weakness so he went to the emergency room.  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  Neurology recommended physical therapy and fall precautions.  Patient was discharged to skilled nursing facility for therapy and to continue medical management.  Today patient endorses difficulty moving his bowels.  He denies abdominal pain nausea and vomiting.    3/28/2023 patient seen and examined at bedside working with therapy.  Patient states he is feeling okay.  He is able to walk 200 feet with assist according to the therapist.  No new concerns today.    3/30/2023 patient is at skilled nursing facility for therapy due to generalized weakness.  He continues to work towards goals.  He is able to walk 200 feet with assist.  Denies constitutional symptoms.    3/31/23 Patient working in therapy due to weakness. He is ambulating up to 200' with CGA. No new issues or concerns at this time. Denies SOB.     23  patient continues to improve with therapy.  Denies shortness of breath he is ambulating further distances with  contact-guard assist.  No new concerns or complaints.  No acute distress.    23  therapy has been working with the patient to improve strength and endurance with ADLs, transfers, and mobility.  Patient continues to work toward goals.  Patient is stable and has no new complaints.  Nursing staff voices no new  concerns today.    4/7/23   Patient is working in therapy.  Patient requires minimal assistance for transfers and ADLs.  He is doing well and has no new issues or concerns today.  No acute distress. Denies pain.     Patient was admitted to SNF after being in the hospital for edema. Patient had worsening edema of lower left extremity. Patient previously lived at home. Pt has had increased weakness. Pt was evaluated by PT and OT and discharged to SNF.    8/18/23 Patient seen and evaluated at bedside.  Baseline mentation, oriented, pleasant. complains of weakness.  Evaluated for PT for strengthening and gait training.  L LE edema improving.  Offers no complaints at time.  No concerns per nursing.      Objective  Vital signs: 131/76-72-18    Physical Exam  Constitutional:       Appearance: Normal appearance.   HENT:      Head: Normocephalic.      Mouth/Throat:      Mouth: Mucous membranes are moist.   Eyes:      Extraocular Movements: Extraocular movements intact.   Cardiovascular:      Rate and Rhythm: Normal rate and regular rhythm.      Pulses: Normal pulses.      Heart sounds: Normal heart sounds.   Pulmonary:      Effort: Pulmonary effort is normal.      Breath sounds: Normal breath sounds.   Abdominal:      General: Bowel sounds are normal.      Palpations: Abdomen is soft.   Musculoskeletal:         General: Normal range of motion.      Cervical back: Normal range of motion and neck supple.      Left lower leg: Edema present.      Comments:   Generalized weakness   Skin:     General: Skin is warm and dry.      Capillary Refill: Capillary refill takes less than 2 seconds.   Neurological:      Mental Status: He is alert and oriented to person, place, and time. Mental status is at baseline.   Psychiatric:         Mood and Affect: Mood normal.         Behavior: Behavior normal.         Assessment/Plan  Problem List Items Addressed This Visit       CHF (congestive heart failure) (CMS/[facility])      Stable   L LE edema  improving   no SOB, chest pain, cough, Rales   continue current therapy   monitor         Hypertension, essential      Stable   continue current therapy   monitor         Weakness      therapy         AF (atrial fibrillation) (CMS/HCC) - Primary (Chronic)      Stable   no SOB, palpitations, dizziness   continue current therapy   monitor          Medications, treatments, and labs reviewed  Continue medications and treatments as listed in EMR    TERENCE Denis      Electronically Signed By: TERENCE Denis   8/21/23  7:31 PM

## 2023-08-21 NOTE — PROGRESS NOTES
PROGRESS NOTE    Subjective   Chief complaint: Shashi Gutierrez is a 78 y.o. male who is an acute skilled patient being seen and evaluated for weakness    HPI:  3/27/2023 patient admitted to skilled nursing facility for therapy due to weakness after recent hospitalization for difficulty walking.  Patient was experiencing difficulty walking for 2 days due to leg weakness so he went to the emergency room.  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  Neurology recommended physical therapy and fall precautions.  Patient was discharged to skilled nursing facility for therapy and to continue medical management.  Today patient endorses difficulty moving his bowels.  He denies abdominal pain nausea and vomiting.    3/28/2023 patient seen and examined at bedside working with therapy.  Patient states he is feeling okay.  He is able to walk 200 feet with assist according to the therapist.  No new concerns today.    3/30/2023 patient is at skilled nursing facility for therapy due to generalized weakness.  He continues to work towards goals.  He is able to walk 200 feet with assist.  Denies constitutional symptoms.    3/31/23 Patient working in therapy due to weakness. He is ambulating up to 200' with CGA. No new issues or concerns at this time. Denies SOB.     4/5/23  patient continues to improve with therapy.  Denies shortness of breath he is ambulating further distances with  contact-guard assist.  No new concerns or complaints.  No acute distress.    4/6/23  therapy has been working with the patient to improve strength and endurance with ADLs, transfers, and mobility.  Patient continues to work toward goals.  Patient is stable and has no new complaints.  Nursing staff voices no new concerns today.    4/7/23   Patient is working in therapy.  Patient requires  minimal assistance for transfers and ADLs.  He is doing well and has no new issues or concerns today.  No acute distress. Denies pain.     Patient was admitted to SNF after being in the hospital for edema. Patient had worsening edema of lower left extremity. Patient previously lived at home. Pt has had increased weakness. Pt was evaluated by PT and OT and discharged to SNF.    8/18/23 Patient seen and evaluated at bedside.  Baseline mentation, oriented, pleasant. complains of weakness.  Evaluated for PT for strengthening and gait training.  L LE edema improving.  Offers no complaints at time.  No concerns per nursing.      Objective   Vital signs: 131/76-72-18    Physical Exam  Constitutional:       Appearance: Normal appearance.   HENT:      Head: Normocephalic.      Mouth/Throat:      Mouth: Mucous membranes are moist.   Eyes:      Extraocular Movements: Extraocular movements intact.   Cardiovascular:      Rate and Rhythm: Normal rate and regular rhythm.      Pulses: Normal pulses.      Heart sounds: Normal heart sounds.   Pulmonary:      Effort: Pulmonary effort is normal.      Breath sounds: Normal breath sounds.   Abdominal:      General: Bowel sounds are normal.      Palpations: Abdomen is soft.   Musculoskeletal:         General: Normal range of motion.      Cervical back: Normal range of motion and neck supple.      Left lower leg: Edema present.      Comments:   Generalized weakness   Skin:     General: Skin is warm and dry.      Capillary Refill: Capillary refill takes less than 2 seconds.   Neurological:      Mental Status: He is alert and oriented to person, place, and time. Mental status is at baseline.   Psychiatric:         Mood and Affect: Mood normal.         Behavior: Behavior normal.         Assessment/Plan   Problem List Items Addressed This Visit       CHF (congestive heart failure) (CMS/HCC)      Stable   L LE edema improving   no SOB, chest pain, cough, Rales   continue current therapy    monitor         Hypertension, essential      Stable   continue current therapy   monitor         Weakness      therapy         AF (atrial fibrillation) (CMS/[facility]) - Primary (Chronic)      Stable   no SOB, palpitations, dizziness   continue current therapy   monitor          Medications, treatments, and labs reviewed  Continue medications and treatments as listed in EMR    ASHLEY Denis-CNP

## 2023-08-21 NOTE — ASSESSMENT & PLAN NOTE
Stable   L LE edema improving   no SOB, chest pain, cough, Rales   continue current therapy   monitor

## 2023-09-28 LAB
ANION GAP IN SER/PLAS: 11 MMOL/L (ref 10–20)
APPEARANCE, URINE: ABNORMAL
BACTERIA, URINE: ABNORMAL /HPF
BASOPHILS (10*3/UL) IN BLOOD BY AUTOMATED COUNT: 0.01 X10E9/L (ref 0–0.1)
BASOPHILS/100 LEUKOCYTES IN BLOOD BY AUTOMATED COUNT: 0.1 % (ref 0–2)
BILIRUBIN, URINE: NEGATIVE
BLOOD, URINE: ABNORMAL
CALCIUM (MG/DL) IN SER/PLAS: 8.9 MG/DL (ref 8.6–10.3)
CARBON DIOXIDE, TOTAL (MMOL/L) IN SER/PLAS: 29 MMOL/L (ref 21–32)
CHLORIDE (MMOL/L) IN SER/PLAS: 103 MMOL/L (ref 98–107)
COLOR, URINE: YELLOW
CREATININE (MG/DL) IN SER/PLAS: 1.02 MG/DL (ref 0.5–1.3)
EOSINOPHILS (10*3/UL) IN BLOOD BY AUTOMATED COUNT: 0.09 X10E9/L (ref 0–0.4)
EOSINOPHILS/100 LEUKOCYTES IN BLOOD BY AUTOMATED COUNT: 0.8 % (ref 0–6)
ERYTHROCYTE DISTRIBUTION WIDTH (RATIO) BY AUTOMATED COUNT: 12.3 % (ref 11.5–14.5)
ERYTHROCYTE MEAN CORPUSCULAR HEMOGLOBIN CONCENTRATION (G/DL) BY AUTOMATED: 31.3 G/DL (ref 32–36)
ERYTHROCYTE MEAN CORPUSCULAR VOLUME (FL) BY AUTOMATED COUNT: 87 FL (ref 80–100)
ERYTHROCYTES (10*6/UL) IN BLOOD BY AUTOMATED COUNT: 3.94 X10E12/L (ref 4.5–5.9)
FLU A RESULT: NOT DETECTED
FLU B RESULT: NOT DETECTED
GFR MALE: 76 ML/MIN/1.73M2
GLUCOSE (MG/DL) IN SER/PLAS: 96 MG/DL (ref 74–99)
GLUCOSE, URINE: NEGATIVE MG/DL
HEMATOCRIT (%) IN BLOOD BY AUTOMATED COUNT: 34.2 % (ref 41–52)
HEMOGLOBIN (G/DL) IN BLOOD: 10.7 G/DL (ref 13.5–17.5)
IMMATURE GRANULOCYTES/100 LEUKOCYTES IN BLOOD BY AUTOMATED COUNT: 0.5 % (ref 0–0.9)
KETONES, URINE: NEGATIVE MG/DL
LEUKOCYTE ESTERASE, URINE: ABNORMAL
LEUKOCYTES (10*3/UL) IN BLOOD BY AUTOMATED COUNT: 11.5 X10E9/L (ref 4.4–11.3)
LYMPHOCYTES (10*3/UL) IN BLOOD BY AUTOMATED COUNT: 1.2 X10E9/L (ref 0.8–3)
LYMPHOCYTES/100 LEUKOCYTES IN BLOOD BY AUTOMATED COUNT: 10.5 % (ref 13–44)
MONOCYTES (10*3/UL) IN BLOOD BY AUTOMATED COUNT: 1 X10E9/L (ref 0.05–0.8)
MONOCYTES/100 LEUKOCYTES IN BLOOD BY AUTOMATED COUNT: 8.7 % (ref 2–10)
NATRIURETIC PEPTIDE B (PG/ML) IN SER/PLAS: 46 PG/ML (ref 0–99)
NEUTROPHILS (10*3/UL) IN BLOOD BY AUTOMATED COUNT: 9.11 X10E9/L (ref 1.6–5.5)
NEUTROPHILS/100 LEUKOCYTES IN BLOOD BY AUTOMATED COUNT: 79.4 % (ref 40–80)
NITRITE, URINE: NEGATIVE
PH, URINE: 5 (ref 5–8)
PLATELETS (10*3/UL) IN BLOOD AUTOMATED COUNT: 212 X10E9/L (ref 150–450)
POTASSIUM (MMOL/L) IN SER/PLAS: 3.7 MMOL/L (ref 3.5–5.3)
PROTEIN, URINE: ABNORMAL MG/DL
RBC, URINE: 18 /HPF (ref 0–5)
SARS-COV-2 RESULT: NOT DETECTED
SODIUM (MMOL/L) IN SER/PLAS: 139 MMOL/L (ref 136–145)
SPECIFIC GRAVITY, URINE: 1.02 (ref 1–1.03)
TROPONIN I, HIGH SENSITIVITY: 10 NG/L (ref 0–20)
UREA NITROGEN (MG/DL) IN SER/PLAS: 19 MG/DL (ref 6–23)
UROBILINOGEN, URINE: 2 MG/DL (ref 0–1.9)
WBC, URINE: >182 /HPF (ref 0–5)

## 2023-09-28 PROCEDURE — 93005 ELECTROCARDIOGRAM TRACING: CPT

## 2023-09-28 PROCEDURE — 87086 URINE CULTURE/COLONY COUNT: CPT

## 2023-09-28 PROCEDURE — 83880 ASSAY OF NATRIURETIC PEPTIDE: CPT

## 2023-09-28 PROCEDURE — 80048 BASIC METABOLIC PNL TOTAL CA: CPT

## 2023-09-28 PROCEDURE — 85025 COMPLETE CBC W/AUTO DIFF WBC: CPT

## 2023-09-28 PROCEDURE — 9990 CHARGE CONVERSION

## 2023-09-28 PROCEDURE — 87077 CULTURE AEROBIC IDENTIFY: CPT

## 2023-09-28 PROCEDURE — 71045 X-RAY EXAM CHEST 1 VIEW: CPT

## 2023-09-28 PROCEDURE — 81001 URINALYSIS AUTO W/SCOPE: CPT

## 2023-09-28 PROCEDURE — 99285 EMERGENCY DEPT VISIT HI MDM: CPT

## 2023-09-28 PROCEDURE — 84484 ASSAY OF TROPONIN QUANT: CPT

## 2023-09-28 PROCEDURE — 87636 SARSCOV2 & INF A&B AMP PRB: CPT

## 2023-09-28 PROCEDURE — 87186 SC STD MICRODIL/AGAR DIL: CPT

## 2023-09-29 ENCOUNTER — HOSPITAL ENCOUNTER (OUTPATIENT)
Dept: DATA CONVERSION | Facility: HOSPITAL | Age: 78
Setting detail: OBSERVATION
Discharge: NURSING, REHAB OR LONG-TERM CARE | End: 2023-10-06
Attending: INTERNAL MEDICINE | Admitting: INTERNAL MEDICINE
Payer: COMMERCIAL

## 2023-09-29 DIAGNOSIS — R53.1 WEAKNESS: ICD-10-CM

## 2023-09-29 DIAGNOSIS — I10 HYPERTENSION, ESSENTIAL: Primary | ICD-10-CM

## 2023-09-29 DIAGNOSIS — G20.A1 PARKINSON'S DISEASE: ICD-10-CM

## 2023-09-29 LAB
ANION GAP IN SER/PLAS: 13 MMOL/L (ref 10–20)
ANION GAP IN SER/PLAS: NORMAL
CALCIUM (MG/DL) IN SER/PLAS: 8.9 MG/DL (ref 8.6–10.3)
CALCIUM (MG/DL) IN SER/PLAS: NORMAL
CARBON DIOXIDE, TOTAL (MMOL/L) IN SER/PLAS: 26 MMOL/L (ref 21–32)
CARBON DIOXIDE, TOTAL (MMOL/L) IN SER/PLAS: NORMAL
CHLORIDE (MMOL/L) IN SER/PLAS: 103 MMOL/L (ref 98–107)
CHLORIDE (MMOL/L) IN SER/PLAS: NORMAL
CREATININE (MG/DL) IN SER/PLAS: 0.88 MG/DL (ref 0.5–1.3)
CREATININE (MG/DL) IN SER/PLAS: NORMAL
ERYTHROCYTE DISTRIBUTION WIDTH (RATIO) BY AUTOMATED COUNT: 12.3 % (ref 11.5–14.5)
ERYTHROCYTE DISTRIBUTION WIDTH (RATIO) BY AUTOMATED COUNT: NORMAL
ERYTHROCYTE MEAN CORPUSCULAR HEMOGLOBIN CONCENTRATION (G/DL) BY AUTOMATED: 31.6 G/DL (ref 32–36)
ERYTHROCYTE MEAN CORPUSCULAR HEMOGLOBIN CONCENTRATION (G/DL) BY AUTOMATED: NORMAL
ERYTHROCYTE MEAN CORPUSCULAR VOLUME (FL) BY AUTOMATED COUNT: 86 FL (ref 80–100)
ERYTHROCYTE MEAN CORPUSCULAR VOLUME (FL) BY AUTOMATED COUNT: NORMAL
ERYTHROCYTES (10*6/UL) IN BLOOD BY AUTOMATED COUNT: 4.01 X10E12/L (ref 4.5–5.9)
ERYTHROCYTES (10*6/UL) IN BLOOD BY AUTOMATED COUNT: NORMAL
GFR FEMALE: NORMAL
GFR MALE: 88 ML/MIN/1.73M2
GFR MALE: NORMAL
GLUCOSE (MG/DL) IN SER/PLAS: 107 MG/DL (ref 74–99)
GLUCOSE (MG/DL) IN SER/PLAS: NORMAL
HEMATOCRIT (%) IN BLOOD BY AUTOMATED COUNT: 34.5 % (ref 41–52)
HEMATOCRIT (%) IN BLOOD BY AUTOMATED COUNT: NORMAL
HEMOGLOBIN (G/DL) IN BLOOD: 10.9 G/DL (ref 13.5–17.5)
HEMOGLOBIN (G/DL) IN BLOOD: NORMAL
LEUKOCYTES (10*3/UL) IN BLOOD BY AUTOMATED COUNT: 9.3 X10E9/L (ref 4.4–11.3)
LEUKOCYTES (10*3/UL) IN BLOOD BY AUTOMATED COUNT: NORMAL
NRBC (PER 100 WBCS) BY AUTOMATED COUNT: NORMAL
PLATELETS (10*3/UL) IN BLOOD AUTOMATED COUNT: 217 X10E9/L (ref 150–450)
PLATELETS (10*3/UL) IN BLOOD AUTOMATED COUNT: NORMAL
POTASSIUM (MMOL/L) IN SER/PLAS: 3.8 MMOL/L (ref 3.5–5.3)
POTASSIUM (MMOL/L) IN SER/PLAS: NORMAL
SODIUM (MMOL/L) IN SER/PLAS: 138 MMOL/L (ref 136–145)
SODIUM (MMOL/L) IN SER/PLAS: NORMAL
UREA NITROGEN (MG/DL) IN SER/PLAS: 16 MG/DL (ref 6–23)
UREA NITROGEN (MG/DL) IN SER/PLAS: NORMAL
URINE CULTURE: ABNORMAL

## 2023-09-29 PROCEDURE — 84484 ASSAY OF TROPONIN QUANT: CPT

## 2023-09-29 PROCEDURE — 80048 BASIC METABOLIC PNL TOTAL CA: CPT

## 2023-09-29 PROCEDURE — 81001 URINALYSIS AUTO W/SCOPE: CPT

## 2023-09-29 PROCEDURE — 93005 ELECTROCARDIOGRAM TRACING: CPT

## 2023-09-29 PROCEDURE — 99285 EMERGENCY DEPT VISIT HI MDM: CPT

## 2023-09-29 PROCEDURE — 9990 CHARGE CONVERSION: Mod: GO

## 2023-09-29 PROCEDURE — 87086 URINE CULTURE/COLONY COUNT: CPT

## 2023-09-29 PROCEDURE — 83880 ASSAY OF NATRIURETIC PEPTIDE: CPT

## 2023-09-29 PROCEDURE — 87636 SARSCOV2 & INF A&B AMP PRB: CPT

## 2023-09-29 PROCEDURE — 85027 COMPLETE CBC AUTOMATED: CPT

## 2023-09-29 PROCEDURE — 85025 COMPLETE CBC W/AUTO DIFF WBC: CPT

## 2023-09-29 PROCEDURE — 36415 COLL VENOUS BLD VENIPUNCTURE: CPT

## 2023-09-29 PROCEDURE — 97165 OT EVAL LOW COMPLEX 30 MIN: CPT | Mod: GO

## 2023-09-29 RX ORDER — ONDANSETRON HYDROCHLORIDE 2 MG/ML
4 INJECTION, SOLUTION INTRAVENOUS EVERY 4 HOURS PRN
Status: DISCONTINUED | OUTPATIENT
Start: 2023-09-30 | End: 2023-10-06 | Stop reason: HOSPADM

## 2023-09-29 RX ORDER — FERROUS SULFATE 325(65) MG
65 TABLET ORAL
Status: DISCONTINUED | OUTPATIENT
Start: 2023-09-30 | End: 2023-10-06 | Stop reason: HOSPADM

## 2023-09-29 RX ORDER — SENNOSIDES 8.6 MG/1
1 TABLET ORAL 2 TIMES DAILY PRN
Status: DISCONTINUED | OUTPATIENT
Start: 2023-09-30 | End: 2023-10-06 | Stop reason: HOSPADM

## 2023-09-29 RX ORDER — FUROSEMIDE 40 MG/1
40 TABLET ORAL DAILY
Status: DISCONTINUED | OUTPATIENT
Start: 2023-09-30 | End: 2023-10-06 | Stop reason: HOSPADM

## 2023-09-29 RX ORDER — POTASSIUM CHLORIDE 750 MG/1
10 TABLET, FILM COATED, EXTENDED RELEASE ORAL DAILY
Status: DISCONTINUED | OUTPATIENT
Start: 2023-09-30 | End: 2023-10-06 | Stop reason: HOSPADM

## 2023-09-29 RX ORDER — ACETAMINOPHEN 325 MG/1
650 TABLET ORAL EVERY 4 HOURS PRN
Status: DISCONTINUED | OUTPATIENT
Start: 2023-09-30 | End: 2023-10-06 | Stop reason: HOSPADM

## 2023-09-29 RX ORDER — ATORVASTATIN CALCIUM 20 MG/1
20 TABLET, FILM COATED ORAL NIGHTLY
Status: DISCONTINUED | OUTPATIENT
Start: 2023-09-30 | End: 2023-10-06 | Stop reason: HOSPADM

## 2023-09-29 RX ORDER — ALBUTEROL SULFATE 0.83 MG/ML
3 SOLUTION RESPIRATORY (INHALATION) EVERY 6 HOURS PRN
Status: DISCONTINUED | OUTPATIENT
Start: 2023-09-30 | End: 2023-10-06 | Stop reason: HOSPADM

## 2023-09-29 RX ORDER — CARVEDILOL 6.25 MG/1
6.25 TABLET ORAL 2 TIMES DAILY
Status: DISCONTINUED | OUTPATIENT
Start: 2023-09-30 | End: 2023-10-06 | Stop reason: HOSPADM

## 2023-09-29 RX ORDER — POLYETHYLENE GLYCOL 3350 17 G/17G
17 POWDER, FOR SOLUTION ORAL 2 TIMES DAILY PRN
Status: DISCONTINUED | OUTPATIENT
Start: 2023-09-30 | End: 2023-10-06 | Stop reason: HOSPADM

## 2023-09-29 RX ORDER — MULTIVIT-MIN/IRON FUM/FOLIC AC 7.5 MG-4
1 TABLET ORAL DAILY
Status: DISCONTINUED | OUTPATIENT
Start: 2023-09-30 | End: 2023-10-06 | Stop reason: HOSPADM

## 2023-09-29 RX ORDER — LISINOPRIL 5 MG/1
5 TABLET ORAL DAILY
Status: DISCONTINUED | OUTPATIENT
Start: 2023-09-30 | End: 2023-10-06 | Stop reason: HOSPADM

## 2023-09-29 RX ORDER — TALC
3 POWDER (GRAM) TOPICAL NIGHTLY PRN
Status: DISCONTINUED | OUTPATIENT
Start: 2023-09-30 | End: 2023-10-06 | Stop reason: HOSPADM

## 2023-09-29 RX ORDER — OXYCODONE HYDROCHLORIDE 5 MG/1
5 TABLET ORAL EVERY 4 HOURS PRN
Status: DISCONTINUED | OUTPATIENT
Start: 2023-09-30 | End: 2023-10-06 | Stop reason: HOSPADM

## 2023-09-29 RX ORDER — DOCUSATE SODIUM 100 MG/1
100 CAPSULE, LIQUID FILLED ORAL 2 TIMES DAILY
Status: DISCONTINUED | OUTPATIENT
Start: 2023-09-30 | End: 2023-10-06 | Stop reason: HOSPADM

## 2023-09-29 RX ORDER — CARBIDOPA AND LEVODOPA 25; 100 MG/1; MG/1
1 TABLET ORAL 3 TIMES DAILY
Status: DISCONTINUED | OUTPATIENT
Start: 2023-09-30 | End: 2023-10-06 | Stop reason: HOSPADM

## 2023-09-30 LAB
ERYTHROCYTE [DISTWIDTH] IN BLOOD BY AUTOMATED COUNT: 12.3 % (ref 11.5–14.5)
HCT VFR BLD AUTO: 34.8 % (ref 41–52)
HGB BLD-MCNC: 10.6 G/DL (ref 13.5–17.5)
MCH RBC QN AUTO: 27.3 PG (ref 26–34)
MCHC RBC AUTO-ENTMCNC: 30.5 G/DL (ref 32–36)
MCV RBC AUTO: 90 FL (ref 80–100)
NRBC BLD-RTO: 0 /100 WBCS (ref 0–0)
PLATELET # BLD AUTO: 216 X10*3/UL (ref 150–450)
PMV BLD AUTO: 11.1 FL (ref 7.5–11.5)
RBC # BLD AUTO: 3.88 X10*6/UL (ref 4.5–5.9)
WBC # BLD AUTO: 6.6 X10*3/UL (ref 4.4–11.3)

## 2023-09-30 PROCEDURE — G0008 ADMIN INFLUENZA VIRUS VAC: HCPCS | Performed by: INTERNAL MEDICINE

## 2023-09-30 PROCEDURE — 87186 SC STD MICRODIL/AGAR DIL: CPT

## 2023-09-30 PROCEDURE — 80048 BASIC METABOLIC PNL TOTAL CA: CPT | Performed by: INTERNAL MEDICINE

## 2023-09-30 PROCEDURE — 99233 SBSQ HOSP IP/OBS HIGH 50: CPT | Performed by: INTERNAL MEDICINE

## 2023-09-30 PROCEDURE — 87077 CULTURE AEROBIC IDENTIFY: CPT

## 2023-09-30 PROCEDURE — 97161 PT EVAL LOW COMPLEX 20 MIN: CPT | Mod: GP

## 2023-09-30 PROCEDURE — 2500000004 HC RX 250 GENERAL PHARMACY W/ HCPCS (ALT 636 FOR OP/ED)

## 2023-09-30 PROCEDURE — 85027 COMPLETE CBC AUTOMATED: CPT | Performed by: INTERNAL MEDICINE

## 2023-09-30 PROCEDURE — G0378 HOSPITAL OBSERVATION PER HR: HCPCS

## 2023-09-30 PROCEDURE — 2500000004 HC RX 250 GENERAL PHARMACY W/ HCPCS (ALT 636 FOR OP/ED): Performed by: INTERNAL MEDICINE

## 2023-09-30 PROCEDURE — 96372 THER/PROPH/DIAG INJ SC/IM: CPT | Performed by: INTERNAL MEDICINE

## 2023-09-30 PROCEDURE — 90471 IMMUNIZATION ADMIN: CPT | Performed by: INTERNAL MEDICINE

## 2023-09-30 PROCEDURE — 90686 IIV4 VACC NO PRSV 0.5 ML IM: CPT | Performed by: INTERNAL MEDICINE

## 2023-09-30 PROCEDURE — 36415 COLL VENOUS BLD VENIPUNCTURE: CPT | Performed by: INTERNAL MEDICINE

## 2023-09-30 PROCEDURE — 36415 COLL VENOUS BLD VENIPUNCTURE: CPT

## 2023-09-30 PROCEDURE — 2500000001 HC RX 250 WO HCPCS SELF ADMINISTERED DRUGS (ALT 637 FOR MEDICARE OP): Performed by: INTERNAL MEDICINE

## 2023-09-30 PROCEDURE — 85027 COMPLETE CBC AUTOMATED: CPT

## 2023-09-30 PROCEDURE — 9990 CHARGE CONVERSION

## 2023-09-30 PROCEDURE — 80048 BASIC METABOLIC PNL TOTAL CA: CPT

## 2023-09-30 RX ADMIN — CARVEDILOL 6.25 MG: 6.25 TABLET, FILM COATED ORAL at 09:00

## 2023-09-30 RX ADMIN — RIVAROXABAN 20 MG: 20 TABLET, FILM COATED ORAL at 18:00

## 2023-09-30 RX ADMIN — CEFTRIAXONE 2 G: 2 INJECTION, POWDER, FOR SOLUTION INTRAMUSCULAR; INTRAVENOUS at 22:52

## 2023-09-30 RX ADMIN — LISINOPRIL 5 MG: 5 TABLET ORAL at 09:00

## 2023-09-30 RX ADMIN — DOCUSATE SODIUM 100 MG: 100 CAPSULE, LIQUID FILLED ORAL at 21:48

## 2023-09-30 RX ADMIN — CARBIDOPA AND LEVODOPA 1 TABLET: 25; 100 TABLET ORAL at 21:48

## 2023-09-30 RX ADMIN — CARVEDILOL 6.25 MG: 6.25 TABLET, FILM COATED ORAL at 21:48

## 2023-09-30 RX ADMIN — CARBIDOPA AND LEVODOPA 1 TABLET: 25; 100 TABLET ORAL at 09:00

## 2023-09-30 RX ADMIN — FUROSEMIDE 40 MG: 40 TABLET ORAL at 09:00

## 2023-09-30 RX ADMIN — DOCUSATE SODIUM 100 MG: 100 CAPSULE, LIQUID FILLED ORAL at 09:00

## 2023-09-30 RX ADMIN — Medication 1 TABLET: at 09:00

## 2023-09-30 RX ADMIN — ATORVASTATIN CALCIUM 20 MG: 20 TABLET, FILM COATED ORAL at 21:48

## 2023-09-30 RX ADMIN — CARBIDOPA AND LEVODOPA 1 TABLET: 25; 100 TABLET ORAL at 15:00

## 2023-09-30 RX ADMIN — Medication 3 MG: at 21:54

## 2023-09-30 RX ADMIN — FERROUS SULFATE TAB 325 MG (65 MG ELEMENTAL FE) 65 MG OF IRON: 325 (65 FE) TAB at 09:00

## 2023-09-30 RX ADMIN — OXYCODONE HYDROCHLORIDE 5 MG: 5 TABLET ORAL at 21:53

## 2023-09-30 RX ADMIN — POTASSIUM CHLORIDE 10 MEQ: 750 TABLET, EXTENDED RELEASE ORAL at 09:00

## 2023-09-30 RX ADMIN — INFLUENZA VIRUS VACCINE 0.5 ML: 15; 15; 15; 15 SUSPENSION INTRAMUSCULAR at 09:00

## 2023-09-30 ASSESSMENT — PAIN - FUNCTIONAL ASSESSMENT: PAIN_FUNCTIONAL_ASSESSMENT: 0-10

## 2023-09-30 ASSESSMENT — COGNITIVE AND FUNCTIONAL STATUS - GENERAL
DRESSING REGULAR LOWER BODY CLOTHING: A LOT
MOBILITY SCORE: 10
WALKING IN HOSPITAL ROOM: TOTAL
MOVING TO AND FROM BED TO CHAIR: A LOT
WALKING IN HOSPITAL ROOM: TOTAL
MOVING TO AND FROM BED TO CHAIR: A LOT
MOBILITY SCORE: 10
HELP NEEDED FOR BATHING: A LOT
TOILETING: A LOT
CLIMB 3 TO 5 STEPS WITH RAILING: TOTAL
PERSONAL GROOMING: A LOT
TURNING FROM BACK TO SIDE WHILE IN FLAT BAD: A LOT
DAILY ACTIVITIY SCORE: 14
MOVING FROM LYING ON BACK TO SITTING ON SIDE OF FLAT BED WITH BEDRAILS: A LOT
STANDING UP FROM CHAIR USING ARMS: A LOT
STANDING UP FROM CHAIR USING ARMS: A LOT
DRESSING REGULAR UPPER BODY CLOTHING: A LOT
CLIMB 3 TO 5 STEPS WITH RAILING: TOTAL
TURNING FROM BACK TO SIDE WHILE IN FLAT BAD: A LOT
MOVING FROM LYING ON BACK TO SITTING ON SIDE OF FLAT BED WITH BEDRAILS: A LOT

## 2023-09-30 ASSESSMENT — PAIN SCALES - GENERAL
PAINLEVEL_OUTOF10: 0 - NO PAIN
PAINLEVEL_OUTOF10: 0 - NO PAIN
PAINLEVEL_OUTOF10: 6

## 2023-09-30 NOTE — CARE PLAN
Problem: Strength  Description: Pt will demonstrate ability to complete dynamic seated activities (LE therapeutic exercises) over the course of 10 min with SBA  Goal: Goal 1  Outcome: Progressing     Problem: Mobility  Goal: STG - Patient will ambulate  Description: >20ft using FWW with CGA  Outcome: Progressing     Problem: Transfers  Goal: STG - Patient will perform bed mobility  Description: SBA  Outcome: Progressing  Goal: STG - Patient will transfer sit to and from stand  Description: CGA with no verbal cues for sequencing  Outcome: Progressing

## 2023-09-30 NOTE — PROGRESS NOTES
Physical Therapy    Physical Therapy Evaluation    Patient Name: Shashi Gutierrez  MRN: [id number]  Today's Date: 9/30/2023   Time Calculation  Start Time: 1354  Stop Time: 1412  Time Calculation (min): 18 min    Assessment/Plan   PT Assessment  PT Assessment Results: Decreased strength, Decreased endurance, Impaired balance, Decreased mobility, Decreased cognition, Impaired judgement, Decreased safety awareness  Rehab Prognosis: Good  Barriers to Discharge: Cognitive deficits; requiring substantial assistance for all mobility  Evaluation/Treatment Tolerance: Patient limited by fatigue  Medical Staff Made Aware: Yes  Strengths: Other (Comment) (pt motivated)  Barriers to Participation: Other (Comment) (cognitive deficits)  End of Session Communication: Bedside nurse (RN notified of pt perfomance and assistance requirements as well as current status/position)  Assessment Comment: Pt demonstrates deficits in cognition, generalized strength and balance resulting in impaired functional mobility and pt would benefit for skilled PT services in acute care setting  End of Session Patient Position: Alarm on, Up in chair  IP OR SWING BED PT PLAN  Inpatient or Swing Bed: Inpatient  PT Plan  Treatment/Interventions: Bed mobility, Transfer training, Gait training, Balance training, Neuromuscular re-education, Stair training, Strengthening, Endurance training, Therapeutic exercise, Therapeutic activity, Home exercise program  PT Plan: Skilled PT  PT Frequency: 3 times per week  PT Discharge Recommendations: Low intensity level of continued care  PT Recommended Transfer Status: Assist x1      Subjective   General Visit Information:  General  Reason for Referral: Pt is a 77 yo male who was brought to [facility] ED for worsening weakness.  Patient was seen at bedside in the ED, was poor historian, but stated that he lives with his son who called EMS to be brought to the hospital for evaluation.  His son was contacted, and he stated that he  "checks regularly on his father, and he was concerned about a UTI due to complaint of weakness, but stated the patient called EMS himself.  Referred By: Dr. Franklin  Past Medical History Relevant to Rehab: paroxysmal atrial fibrillation (on Xarelto), CHF, hyperlipidemia, prostate cancer, essential tremor  Prior to Session Communication: Bedside nurse (RN clearing pt for therapy services stating vitals and status WFL.)  Patient Position Received: Bed, 3 rail up, Alarm on  Home Living:  Home Living  Type of Home: House  Lives With: Adult children (Son. Pt reports 2nd son occasionally assists)  Home Adaptive Equipment: Walker rolling or standard, Wheelchair-manual  Home Layout: Multi-level  Home Access: Stairs to enter with rails  Entrance Stairs-Rails: Left  Entrance Stairs-Number of Steps: 3 (3 TISH +5 TISH (L HR))  Bathroom Shower/Tub: Tub/shower unit, Walk-in shower (Tub shower on main level (walk in shower in basement). Pt stating desire to have walk in shower built upstairs due to recent inability to manage tub shower)  Bathroom Toilet: Standard  Bathroom Equipment: Shower chair with back  Prior Level of Function:  Prior Function Per Pt/Caregiver Report  Level of [place]: Independent with ADLs and functional transfers, Needs assistance with homemaking (Pt reports primarily completes own dressing/self care as well as transfers and ambulation. Pt's son assists with shower transfers, home management, cooking and driving)  Receives Help From: Family  Precautions:  Precautions  Medical Precautions: Fall precautions    Objective   Pain:  Pain Assessment  Pain Assessment: 0-10  Pain Score: 0 - No pain  Cognition:  Cognition  Overall Cognitive Status: Impaired, Impaired at baseline (Pt A&Ox2 stating current date as (\"17 2016\" - current date is 9/30/2023 and PT provided gentle reorientation)  Memory: Exceptions to WFL  Safety/Judgement: Exceptions to WFL    General Assessments:     Activity Tolerance  Endurance: " Tolerates 10 - 20 min exercise with multiple rests    Sensation  Light Touch: No apparent deficits    Static Sitting Balance  Static Sitting-Balance Support: Bilateral upper extremity supported  Static Sitting-Level of Assistance: Close supervision    Static Standing Balance  Static Standing-Balance Support: Bilateral upper extremity supported  Static Standing-Level of Assistance: Minimum assistance  Static Standing-Comment/Number of Minutes: 1  Functional Assessments:  Bed Mobility  Bed Mobility: Yes  Bed Mobility 1  Bed Mobility 1: Supine to sitting  Level of Assistance 1: Moderate assistance    Transfers  Transfer: Yes  Transfer 1  Transfer From 1: Sit to  Transfer to 1: Stand  Technique 1: Stand to sit, Sit to stand  Transfer Device 1: Gait belt  Transfer Level of Assistance 1: Moderate assistance  Trials/Comments 1: Verbal cues for sequencing; mod A to shift weight in anterior to superior direction    Ambulation/Gait Training  Ambulation/Gait Training Performed: Yes  Ambulation/Gait Training 1  Surface 1: Level tile  Device 1: Rolling walker  Gait Support Devices: Gait belt  Assistance 1: Minimum assistance  Quality of Gait 1:  (Flexed trunk posture; reduced B step length; reduced gait precious; difficulty clearing each foot requiring increased time to complete)  Comments/Distance (ft) 1: 3  Extremity/Trunk Assessments:  Strength RLE  R Hip Flexion: 3-/5  R Hip Extension: 3-/5  R Hip ABduction: 3-/5  R Hip ADduction: 3-/5  R Knee Flexion: 3-/5  R Knee Extension: 3-/5  R Ankle Dorsiflexion: 3-/5  R Ankle Plantar Flexion: 3-/5  Strength LLE  L Hip Flexion: 3-/5  L Hip Extension: 3-/5  L Hip ABduction: 3-/5  L Hip ADduction: 3-/5  L Knee Flexion: 3-/5  L Knee Extension: 3-/5  L Ankle Dorsiflexion: 3-/5  L Ankle Plantar Flexion: 3-/5  Outcome Measures:  [facility] Basic Mobility  Turning from your back to your side while in a flat bed without using bedrails: A lot  Moving from lying on your back to sitting on the side of  a flat bed without using bedrails: A lot  Moving to and from bed to chair (including a wheelchair): A lot  Standing up from a chair using your arms (e.g. wheelchair or bedside chair): A lot  To walk in hospital room: Total  Climbing 3-5 steps with railing: Total  Basic Mobility - Total Score: 10    Encounter Problems       Encounter Problems (Active)       Mobility       STG - Patient will ambulate (Progressing)       Start:  09/30/23    Expected End:  10/14/23       >20ft using FWW with CGA            Strength       Pt will demonstrate ability to complete dynamic seated activities (LE therapeutic exercises) over the course of 10 min with SBA      Goal 1 (Progressing)       Start:  09/30/23    Expected End:  10/14/23               Transfers       STG - Patient will perform bed mobility (Progressing)       Start:  09/30/23    Expected End:  10/14/23       SBA         STG - Patient will transfer sit to and from stand (Progressing)       Start:  09/30/23    Expected End:  10/14/23       CGA with no verbal cues for sequencing                Education Documentation  Mobility Training, taught by Ravinder Naik, PT at 9/30/2023  2:55 PM.  Learner: Patient  Readiness: Acceptance  Method: Explanation  Response: Verbalizes Understanding    Education Comments  No comments found.      Ravinder Naik, PT

## 2023-09-30 NOTE — DOWNTIME EVENT NOTE
System down from 0000 until 0720, see pt chart for downtime documentation. Epic initiated all documentation to continue in epic.

## 2023-09-30 NOTE — PROGRESS NOTES
Service: Medicine     Subjective Data:   PARAS MCLAUGHLIN is a 78 year old Male who is Hospital Day # 2.    Additional Information:    No acute events overnight. Appears to still be a bit confused but generally feeling ok. Still weak.     Objective Data:     Objective Information:      T   P  R  BP   MAP  SpO2   Value  36.8  86  18  150/71   97  93%  Date/Time  8:11  8:11  8:11  8:11   2:06  8:11  Range  (36.1C - 36.8C )  (74 - 93 )  (16 - 22 )  (148 - 185 )/ (71 - 94 )  (97 - 107 )  (93% - 99% )      Pain reported at  4:00: sleeping    Physical Exam by System:    Constitutional: Elderly gentleman, A&Ox2 conversational,  but poor historian alert, active, cooperative not in acute distress   Eyes: Pupils are reactive to light, clear sclera   ENMT: Moist mucosal membranes, no exudates   Head/Neck: Normocephalic, atraumatic, supple neck,  JVP not visualized   Respiratory/Thorax: Patent airways, normal breath  sounds, no wheezing appreciated   Cardiovascular: RRR, S1S2, no murmurs appreciated,  palpable pulses in all extremities   Gastrointestinal: Soft, not tender, not distended,  normoactive bowel sounds   Musculoskeletal: Moves upper extremities freely,  resting tremor present, appropriate strength, mild ankle peripheral edema   Extremities: Resting tremor bilaterally of the hip  extremities, trace bilateral lower extremity peripheral edema   Neurological: Grossly intact   Lymphatic: No acute palpable cervical lymphadenopathy   Psychological: Appropriate mood   Skin: Warm and dry, no active lesions on extremities     Medication:    Medications:          Continuous Medications       --------------------------------  No continuous medications are active       Scheduled Medications       --------------------------------    1. Atorvastatin:  20  mg  Oral  Every Night    2. Carbidopa 25 mg - Levodopa 100 m  tablet(s)  Oral  3 Times a Day    3. Carvedilol:  6.25  mg  Oral  2 Times a Day     4. cefTRIAXone 2 gram/Dextrose 5% IVPB Premixed Soln 50 mL:  50  mL  IntraVenous Piggyback  Every 24 Hours    5. Docusate:  100  mg  Oral  2 Times a Day    6. Ferrous Sulfate:  325  mg  Oral  Daily    7. Furosemide:  40  mg  Oral  Daily    8. Lisinopril:  5  mg  Oral  Daily    9. Multivitamin with Minerals:  1  tablet(s)  Oral  Daily    10. Potassium Chloride Extended Release:  10  mEq  Oral  Daily    11. Rivaroxaban:  20  mg  Oral  Daily 1800         PRN Medications       --------------------------------    1. Acetaminophen:  650  mg  Oral  Every 4 Hours    2. Albuterol 2.5 mg/ 3 mL Nebulizer Soln:  3  mL  Inhalation  Every 6 Hours    3. Melatonin:  3  mg  Oral  At Bedtime    4. Ondansetron Injectable:  4  mg  IntraVenous Push  Every 4 Hours    5. oxyCODONE Immediate Release:  5  mg  Oral  Every 4 Hours    6. Polyethylene Glycol:  17  gram(s)  Oral  2 Times a Day    7. Sennosides:  1  tablet(s)  Oral  2 Times a Day    8. Sodium Chloride 0.9% Injectable Flush:  10  mL  IntraVenous Flush  Every 8 Hours and as Needed        Recent Lab Results:    Results:    CBC: 9/29/2023 06:58              \     Hgb     /                              \     10.9 L    /  WBC  ----------------  Plt               9.3       ----------------    217              /     Hct     \                              /     34.5 L    \            RBC: 4.01 L    MCV: 86     Neutrophil %: 79.4      BMP: 9/29/2023 06:58  NA+        Cl-     BUN  /                         138    103    16  /  --------------------------------  Glucose                ---------------------------  107 H    K+     HCO3-   Creat \                         3.8  26    0.88  \  Calcium : 8.9     Anion Gap : 13      Radiology Results:    Results:        Impression:    Low lungvolumes but no evidence of acute intrathoracic abnormality.     Xray Chest 1 View [Sep 28 2023 11:57AM]      Conclusion:  CONCLUSIONS:  1. Left ventricular systolic function is normal with a 60-65%  estimated ejection fraction.  2. Poorly visualized anatomical structures due to suboptimal image quality.  3. Spectral Doppler shows an impaired relaxation pattern of left ventricular diastolic filling.  4. There is moderate left ventricular hypertrophy.  5. Left ventricular cavity size is decreased.  6. Ascending aorta mildly enlarged at 4.0 cm.    QUANTITATIVE DATA SUMMARY:  2D MEASUREMENTS:  Normal Ranges:  LAs:         3.67 cm    (2.7-4.0cm)  IVSd:          1.68 cm    (0.6-1.1cm)  LVPWd:         1.80 cm    (0.6-1.1cm)  LVIDd:         4.05 cm    (3.9-5.9cm)  LVIDs:         2.94 cm  LV Mass Index: 141.7 g/m2  LV % FS        27.4 %    LA VOLUME:  Normal Ranges:  LA Vol A4C:        33.0 ml    (22+/-6mL/m2)  LA Vol A2C:        34.3 ml  LA Vol BP:         34.6 ml  LA Vol Index A4C:  15.6 ml/m2  LA Vol Index A2C:  16.2 ml/m2  LA Vol Index BP:   16.3 ml/m2  LA Area A4C:       14.2 cm2  LA Area A2C:       14.9 cm2  LA Major Axis A4C: 5.2 cm  LA Major Axis A2C: 5.5 cm  LA Volume Index:   16.3 ml/m2  LA Vol A4C:        31.4 ml  LA Vol A2C:        33.0 ml    M-MODE MEASUREMENTS:  Normal Ranges:  LAs: 4.29 cm (2.7-4.0cm)    AORTA MEASUREMENTS:  Normal Ranges:  Ao Sinus, d: 4.10 cm (2.1-3.5cm)  Ao STJ, d:   4.00 cm (1.7-3.4cm)  Asc Ao, d:   3.90 cm (2.1-3.4cm)    LV SYSTOLIC FUNCTION BY 2D PLANIMETRY (MOD):  Normal Ranges:  EF-A4C View: 63.3 % (>=55%)  EF-A2C View: 65.6 %  EF-Biplane:  64.0 %    LV DIASTOLICFUNCTION:  Normal Ranges:  MV Peak E:        0.66 m/s    (0.7-1.2 m/s)  MV Peak A:        0.86 m/s    (0.42-0.7 m/s)  E/A Ratio:        0.77        (1.0-2.2)  MV e'             0.06 m/s    (>8.0)  MV lateral e'     0.07 m/s  MV medial e'      0.06 m/s  MV A Dur:         122.26 msec  E/e' Ratio:       10.95       (<8.0)  PulmV Sys Gerardo:    36.30 cm/s  PulmV Mccracken Gerardo:   32.47 cm/s  PulmV S/D Gerardo:    1.12  PulmV A Revs Gerardo: 27.21 cm/s  PulmV A Revs Dur: 124.57 msec    MITRAL VALVE:  Normal Ranges:  MV Vmax:    1.01 m/s  (<=1.3m/s)  MV peak P.1 mmHg (<5mmHg)  MV mean P.4 mmHg (<2mmHg)  MV VTI:     26.87 cm (10-13cm)  MV DT:      284 msec (150-240msec)    AORTIC VALVE:  Normal Ranges:  AoV Vmax:                1.19 m/s (<=1.7m/s)  AoV Peak P.7 mmHg (<20mmHg)  AoV Mean PG:             3.2 mmHg (1.7-11.5mmHg)  LVOT Max Gerardo:            0.94 m/s (<=1.1m/s)  AoV VTI:                 21.05 cm (18-25cm)  LVOT VTI:                17.50 cm  LVOT Diameter:           2.24 cm  (1.8-2.4cm)  AoV Area, VTI:           3.29 cm2 (2.5-5.5cm2)  AoV Area,Vmax:           3.12 cm2 (2.5-4.5cm2)  AoV Dimensionless Index: 0.83      RIGHT VENTRICLE:  RV Basal 1.90 cm  RV Mid   2.20 cm  RV Major 9.5 cm  TAPSE:   41.0 mm    TRICUSPID VALVE/RVSP:  Normal Ranges:  IVC Diam: 1.40 cm    PULMONIC VALVE:  Normal Ranges:  PV Accel Time: 164 msec (>120ms)  PV Max Gerardo:    0.7 m/s  (0.6-0.9m/s)  PV Max P.1 mmHg    Pulmonary Veins:  PulmV A Revs Dur: 124.57 msec  PulmV A Revs Gerardo: 27.21 cm/s  PulmV Mccracken Gerardo:   32.47 cm/s  PulmV S/D Gerardo:    1.12  PulmV Sys Gerardo:    36.30 cm/s      [id number] Noel Talley DO  Electronically signed on 2023 at 12:26:27 PM        *** Final ***     Echocardiogram [Aug  4 2023 12:26PM]      Assessment and Plan:   Code Status:  ·  Code Status Full Code     Assessment:    78 year old Male with past medical history of paroxysmal atrial fibrillation (on Xarelto), CHF, hyperlipidemia, prostate cancer, essential tremor, was brought to  [facility] ED for worsening weakness and found to have a UTI    UTI: Acute  -Ceftriaxone 2 g IV piggyback daily  -Urine culture result pending    Weakness: Gradual onset, worsening in the setting of UTI  -PT OT  -Son agreed to placement if necessary    Chronic diastolic heart failure: Stable, likely at baseline with peripheral edema  -Monitor for signs of decompensation  -Continue home regimen    Atrial fibrillation  -On Xarelto 20 mg daily  -Carvedilol 6.25 mg twice daily    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

## 2023-09-30 NOTE — PROGRESS NOTES
Paras Mclaughlin is a 78 y.o. male on day 0 of admission presenting with No Principal Problem: There is no principal problem currently on the Problem List. Please update the Problem List and refresh..      Subjective   Pt seen and examined.        Objective     Last Recorded Vitals  /77   Pulse 62   Temp 36.8 °C (98.2 °F)   Resp 16   Wt 95 kg (209 lb 7 oz)   SpO2 98%   Intake/Output last 3 Shifts:  No intake or output data in the 24 hours ending 09/30/23 1241    Admission Weight  Weight: 95 kg (209 lb 7 oz) (09/29/23 1241)    Daily Weight  09/29/23 : 95 kg (209 lb 7 oz)    Image Results  XR chest 1 view  Narrative: Interpreted By:  ESPINOZA BARBER MD  MRN: [id number]  Patient Name: PARAS MCLAUGHLIN     STUDY:  CHEST 1 VIEW     INDICATION:  weakness. Hx CHF .     COMPARISON:  August 29     ACCESSION NUMBER(S):  [id number]     ORDERING CLINICIAN:  TL MCLAUGHLIN     FINDINGS:  Cardiomegaly with tortuous aorta, slightly more prominent due to the  low lung volumes.     No consolidation, effusion, edema, or pneumothorax.     Impression: Low lung volumes but no evidence of acute intrathoracic abnormality.      Physical Exam    Constitutional: Elderly gentleman, A&Ox2 conversational, but poor historian alert, active, cooperative not in acute distress  Eyes: Pupils are reactive to light, clear sclera  ENMT: Moist mucosal membranes, no exudates  Head/Neck: Normocephalic, atraumatic, supple neck, JVP not visualized  Respiratory/Thorax: Patent airways, normal breath sounds, no wheezing appreciated  Cardiovascular: RRR, S1S2, no murmurs appreciated, palpable pulses in all extremities  Gastrointestinal: Soft, not tender, not distended, normoactive bowel sounds  Musculoskeletal: Moves upper extremities freely, resting tremor present, appropriate strength, mild ankle peripheral edema  Extremities: Resting tremor bilaterally of the hip extremities, trace bilateral lower extremity peripheral edema  Neurological: Grossly  intact  Lymphatic: No acute palpable cervical lymphadenopathy      Relevant Results               Assessment/Plan        78 year old Male with past medical history of paroxysmal atrial fibrillation (on Xarelto), CHF, hyperlipidemia, prostate cancer, essential tremor, was brought to [facility] ED for worsening weakness and found to have a UTI    # UTI: Acute  -Ceftriaxone 2 g IV piggyback daily  -Urine culture result pending    # Weakness: Gradual onset, worsening in the setting of UTI  -PT OT  -Son agreed to placement if necessary    # Chronic diastolic heart failure: Stable, likely at baseline with peripheral edema  -Monitor for signs of decompensation  -Continue home regimen    # Atrial fibrillation  -On Xarelto 20 mg daily  -Carvedilol 6.25 mg twice daily    # Essential tremor from parkinsonism  -Continue carbidopa levodopa    DVT prophylaxis  -Omission of heparin anticoagulation, already on Xarelto    Disposition: Potentially will need placement               Active Problems:  There are no active Hospital Problems.                Florence Chin MD

## 2023-09-30 NOTE — H&P
History of Present Illness:   Admission Reason: Weakness   HPI:    PARAS MCLAUGHLIN is a 78 year old Male with past medical history of paroxysmal atrial fibrillation (on Xarelto), CHF, hyperlipidemia, prostate cancer, essential tremor,  was brought to [facility] ED for worsening weakness.  Patient was seen at bedside in the ED, was poor historian, but stated that he lives with his son who called EMS to be brought to the hospital for evaluation.  His son was contacted, and he stated that  he checks regularly on his father, and he was concerned about a UTI due to complaint of weakness, but stated the patient called EMS himself.  All review of systems, patient admits to feeling weeks, but could not add any other symptoms at that time.  As  per ED patient reportedly was having worsening weakness, unable to care for himself reason for bringing him to the hospital for further evaluation.  Initial work-up was overall noncontributory to his weakness, no sign of volume overload, BNP normal, UA  revealed UTI.    Comorbidities:   Comorbidites:  ·  Comorbid Conditions atrial fibrillation, congestive heart failure   ·  Type of Atrial Fibrillation Permanent    ·  Type of Congestive Heart Failure diastolic    ·  CHF Additional Specificity with hypertension    ·  Acuity of CHF chronic      Family History:   Family History: reviewed and not pertinent to presenting  problem     Social History:   Social History:  Smoking Status former smoker   Alcohol Use denies   Drug Use denies   Drug 2 Use denies              Intolerances:  ·  aspirin : GI Upset    Medications Prior to Admission:     Centrum Silver oral tablet: 1 tab(s) orally once a day  ProAir HFA 90 mcg/inh inhalation aerosol: 2 puff(s) inhaled every 6 hours, As Needed  Senna 8.6 mg oral tablet: 1 tab(s) orally 2 times a day, As Needed  ferrous sulfate 325 mg (65 mg elemental iron) oral tablet: 1 tab(s) orally once a day  acetaminophen 325 mg oral tablet: 2 tab(s) orally every 4  hours, As needed, Pain - Mild (1-3)  atorvastatin 20 mg oral tablet: 1 tab(s) orally once a day (at bedtime)  carvedilol 6.25 mg oral tablet: 1 tab(s) orally 2 times a day  rivaroxaban 20 mg oral tablet: 1 tab(s) orally once a day (in the evening)  potassium chloride 10 mEq oral tablet, extended release: 1 tab(s) orally once a day  polyethylene glycol 3350 oral powder for reconstitution: 17 gram(s) orally 2 times a day, As Needed  furosemide 40 mg oral tablet: 1 tab(s) orally once a day  carbidopa-levodopa 25 mg-100 mg oral tablet: 1 tab(s) orally 3 times a day  lisinopril 5 mg oral tablet: 1 tab(s) orally once a day.    Review of Systems:   Constitutional: NEGATIVE: Fever, Chills     Eyes: NEGATIVE: Blurry Vision     ENMT: NEGATIVE: Nasal Discharge, Nasal Congestion     Respiratory: NEGATIVE: Dry Cough, Productive Cough     Cardiac: NEGATIVE: Chest Pain, Dyspnea on Exertion,  Syncope     Gastrointestinal: NEGATIVE: Nausea, Vomiting, Diarrhea,  Constipation, Abdominal Pain     Genitourinary: NEGATIVE: Flank Pain     Musculoskeletal: POSITIVE: Decreased ROM, Pain, Swelling, Stiffness, Weakness     Neurological: POSITIVE: Confusion; NEGATIVE: Dizziness,  Headache     Psychiatric: NEGATIVE: Mood Changes     Skin: NEGATIVE: Pruritus     Endocrine: NEGATIVE: Sweat     Hematologic/Lymph: NEGATIVE: Easy Bleeding       Objective:     Objective Information:      T   P  R  BP   MAP  SpO2   Value  36.1  79  18  148/73      99%  Date/Time 9/28 10:33 9/28 13:17 9/28 13:17 9/28 13:17    9/28 13:17  Range  (36.1C - 36.1C )  (79 - 80 )  (16 - 18 )  (148 - 185 )/ (73 - 94 )    (95% - 99% )      Pain reported at 9/28 13:17: 0 = None    Physical Exam by System:    Constitutional: Elderly gentleman, conversational,  but poor historian alert, active, cooperative not in acute distress   Eyes: Pupils are reactive to light, clear sclera   ENMT: Moist mucosal membranes, no exudates   Head/Neck: Normocephalic, atraumatic, supple neck,  JVP  not visualized   Respiratory/Thorax: Patent airways, normal breath  sounds, no wheezing appreciated   Cardiovascular: RRR, S1S2, no murmurs appreciated,  palpable pulses in all extremities   Gastrointestinal: Soft, not tender, not distended,  normoactive bowel sounds   Genitourinary: No Tse catheter   Musculoskeletal: Moves upper extremities freely,  resting tremor present, appropriate strength, mild ankle peripheral edema   Extremities: Resting tremor bilaterally of the hip  extremities, +1 bilateral lower extremity peripheral edema   Neurological: AAOx2 to self and place, facial muscles  are symmetrical, sensation intact, motor exam limited however patient seems to be at baseline compare to prior H&P, no neurologic focal deficit appreciated at this time   Lymphatic: No acute palpable cervical lymphadenopathy   Psychological: Appropriate mood   Skin: Warm and dry, no active lesions on extremities     Medications:    Medications:          Continuous Medications       --------------------------------  No continuous medications are active       Scheduled Medications       --------------------------------    1. Atorvastatin:  20  mg  Oral  Every Night    2. Carbidopa 25 mg - Levodopa 100 m  tablet(s)  Oral  3 Times a Day    3. Carvedilol:  6.25  mg  Oral  2 Times a Day    4. cefTRIAXone 2 gram/Dextrose 5% IVPB Premixed Soln 50 mL:  50  mL  IntraVenous Piggyback  Every 24 Hours    5. Ferrous Sulfate:  325  mg  Oral  Daily    6. Furosemide:  40  mg  Oral  Daily    7. Lisinopril:  5  mg  Oral  Daily    8. Multivitamin with Minerals:  1  tablet(s)  Oral  Daily    9. Potassium Chloride Extended Release:  10  mEq  Oral  Daily    10. Rivaroxaban:  20  mg  Oral  Daily 1800         PRN Medications       --------------------------------    1. Albuterol 2.5 mg/ 3 mL Nebulizer Soln:  3  mL  Inhalation  Every 6 Hours    2. Polyethylene Glycol:  17  gram(s)  Oral  2 Times a Day    3. Sennosides:  1  tablet(s)  Oral  2 Times a  Day        Recent Lab Results:    Results:        I have reviewed these laboratory results:    Urinalysis with Culture if Indicated  28-Sep-2023 15:23:00      Result Value    Color, Urine  YELLOW  Reference Range: STRAW,YELLOW    Appearance, Urine  CLOUDY    Specific Gravity, Urine  1.019    pH, Urine  5.0    Protein, Urine  30(1+)   A   Glucose, Urine  NEGATIVE    Blood, Urine  MODERATE(2+)   A   Ketones, Urine  NEGATIVE    Bilirubin, Urine  NEGATIVE    Urobilinogen, Urine  2.0   H   Nitrite, Urine  NEGATIVE    Leukocyte Esterase, Urine  LARGE(3+)   A     Urinalysis, Microscopic  28-Sep-2023 15:23:00      Result Value    White Cells  >182   A   Red Blood Cells  18   A   Bacteria, Urine  4+   A     Coronavirus 2019 by PCR  28-Sep-2023 11:09:00      Result Value    Fluid Source  Nasal, Nasopharyngeal    Coronavirus 2019,PCR  NOT DETECTED  Reference Range: Not Detected .This test has received FDA Emergency Use Authorization (EUA) and has been verified by [facility]. This test is only authorized for the duration of time that circumstan      Influenza A + B, PCR  28-Sep-2023 11:09:00      Result Value    Influenza A PCR  NOT DETECTED  Reference Range: Not Detected Respiratory virus testing is performed routinely by PCR  for Influenza A/B and RSV.  Not Detected results do not  preclude Influenza A/B or RSV infections since the adequacy of sample collection or lo    Influenza B PCR  NOT DETECTED  Reference Range: Not Detected Respiratory virus testing is performed routinely by PCR  for Influenza A/B and RSV.  Not Detected results do not  preclude Influenza A/B or RSV infections since the adequacy of sample collection or lo    Fluid Source  Nasal, Nasopharyngeal      Complete Blood Count + Differential  28-Sep-2023 11:03:00      Result Value    White Blood Cell Count  11.5   H   Red Blood Cell Count  3.94   L   HGB  10.7   L   HCT  34.2   L   MCV  87    MCHC  31.3   L   PLT  212    RDW-CV   12.3    Neutrophil %  79.4    Immature Granulocytes %  0.5    Lymphocyte %  10.5    Monocyte %  8.7    Eosinophil %  0.8    Basophil %  0.1    Neutrophil Count  9.11   H   Lymphocyte Count  1.20    Monocyte Count  1.00   H   Eosinophil Count  0.09    Basophil Count  0.01      Basic Metabolic Panel  28-Sep-2023 11:03:00      Result Value    Glucose, Serum  96    NA  139    K  3.7    CL  103    Bicarbonate, Serum  29    Anion Gap, Serum  11    BUN  19    CREAT  1.02    GFR Male  76    Calcium, Serum  8.9      Troponin I, High Sensitivity  28-Sep-2023 11:03:00      Result Value    Troponin I, High Sensitivity  10      Brain Natriuretic Peptide  28-Sep-2023 11:03:00      Result Value    Brain Natriuretic Peptide  46        Radiology Results:    Results:        Impression:    Low lungvolumes but no evidence of acute intrathoracic abnormality.     Xray Chest 1 View [Sep 28 2023 11:57AM]      Conclusion:  CONCLUSIONS:  1. Left ventricular systolic function is normal with a 60-65% estimated ejection fraction.  2. Poorly visualized anatomical structures due to suboptimal image quality.  3. Spectral Doppler shows an impaired relaxation pattern of left ventricular diastolic filling.  4. There is moderate left ventricular hypertrophy.  5. Left ventricular cavity size is decreased.  6. Ascending aorta mildly enlarged at 4.0 cm.    QUANTITATIVE DATA SUMMARY:  2D MEASUREMENTS:  Normal Ranges:  LAs:         3.67 cm    (2.7-4.0cm)  IVSd:          1.68 cm    (0.6-1.1cm)  LVPWd:         1.80 cm    (0.6-1.1cm)  LVIDd:         4.05 cm    (3.9-5.9cm)  LVIDs:         2.94 cm  LV Mass Index: 141.7 g/m2  LV % FS        27.4 %    LA VOLUME:  Normal Ranges:  LA Vol A4C:        33.0 ml    (22+/-6mL/m2)  LA Vol A2C:        34.3 ml  LA Vol BP:         34.6 ml  LA Vol Index A4C:  15.6 ml/m2  LA Vol Index A2C:  16.2 ml/m2  LA Vol Index BP:   16.3 ml/m2  LA Area A4C:       14.2 cm2  LA Area A2C:       14.9 cm2  LA Major Axis A4C: 5.2 cm  LA Major  Axis A2C: 5.5 cm  LA Volume Index:   16.3 ml/m2  LA Vol A4C:        31.4 ml  LA Vol A2C:        33.0 ml    M-MODE MEASUREMENTS:  Normal Ranges:  LAs: 4.29 cm (2.7-4.0cm)    AORTA MEASUREMENTS:  Normal Ranges:  Ao Sinus, d: 4.10 cm (2.1-3.5cm)  Ao STJ, d:   4.00 cm (1.7-3.4cm)  Asc Ao, d:   3.90 cm (2.1-3.4cm)    LV SYSTOLIC FUNCTION BY 2D PLANIMETRY (MOD):  Normal Ranges:  EF-A4C View: 63.3 % (>=55%)  EF-A2C View: 65.6 %  EF-Biplane:  64.0 %    LV DIASTOLICFUNCTION:  Normal Ranges:  MV Peak E:        0.66 m/s    (0.7-1.2 m/s)  MV Peak A:        0.86 m/s    (0.42-0.7 m/s)  E/A Ratio:        0.77        (1.0-2.2)  MV e'             0.06 m/s    (>8.0)  MV lateral e'     0.07 m/s  MV medial e'      0.06 m/s  MV A Dur:         122.26 msec  E/e' Ratio:       10.95       (<8.0)  PulmV Sys Gerardo:    36.30 cm/s  PulmV Mccracken Gerardo:   32.47 cm/s  PulmV S/D Gerardo:    1.12  PulmV A Revs Gerardo: 27.21 cm/s  PulmV A Revs Dur: 124.57 msec    MITRAL VALVE:  Normal Ranges:  MV Vmax:    1.01 m/s (<=1.3m/s)  MV peak P.1 mmHg (<5mmHg)  MV mean P.4 mmHg (<2mmHg)  MV VTI:     26.87 cm (10-13cm)  MV DT:      284 msec (150-240msec)    AORTIC VALVE:  Normal Ranges:  AoV Vmax:                1.19 m/s (<=1.7m/s)  AoV Peak P.7 mmHg (<20mmHg)  AoV Mean PG:             3.2 mmHg (1.7-11.5mmHg)  LVOT Max Gerardo:            0.94 m/s (<=1.1m/s)  AoV VTI:                 21.05 cm (18-25cm)  LVOT VTI:                17.50 cm  LVOT Diameter:           2.24 cm  (1.8-2.4cm)  AoV Area, VTI:           3.29 cm2 (2.5-5.5cm2)  AoV Area,Vmax:           3.12 cm2 (2.5-4.5cm2)  AoV Dimensionless Index: 0.83      RIGHT VENTRICLE:  RV Basal 1.90 cm  RV Mid   2.20 cm  RV Major 9.5 cm  TAPSE:   41.0 mm    TRICUSPID VALVE/RVSP:  Normal Ranges:  IVC Diam: 1.40 cm    PULMONIC VALVE:  Normal Ranges:  PV Accel Time: 164 msec (>120ms)  PV Max Gerardo:    0.7 m/s  (0.6-0.9m/s)  PV Max P.1 mmHg    Pulmonary Veins:  PulmV A Revs Dur: 124.57 msec  PulmV A Revs  Gerardo: 27.21 cm/s  PulmV Mccracken Gerardo:   32.47 cm/s  PulmV S/D Gerardo:    1.12  PulmV Sys Gerardo:    36.30 cm/s      [id number] [address]   Electronically signed on 8/4/2023 at 12:26:27 PM        *** Final ***     Echocardiogram [Aug  4 2023 12:26PM]      Assessment and Plan:   Assessment:    78 year old Male with past medical history of paroxysmal atrial fibrillation (on Xarelto), CHF, hyperlipidemia, prostate cancer, essential tremor, was brought to  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

## 2023-10-01 LAB
ANION GAP SERPL CALC-SCNC: 12 MMOL/L (ref 10–20)
ANION GAP SERPL CALC-SCNC: 14 MMOL/L (ref 10–20)
BUN SERPL-MCNC: 17 MG/DL (ref 6–23)
BUN SERPL-MCNC: 18 MG/DL (ref 6–23)
CALCIUM SERPL-MCNC: 8.7 MG/DL (ref 8.6–10.3)
CALCIUM SERPL-MCNC: 8.7 MG/DL (ref 8.6–10.3)
CHLORIDE SERPL-SCNC: 103 MMOL/L (ref 98–107)
CHLORIDE SERPL-SCNC: 104 MMOL/L (ref 98–107)
CO2 SERPL-SCNC: 26 MMOL/L (ref 21–32)
CO2 SERPL-SCNC: 28 MMOL/L (ref 21–32)
CREAT SERPL-MCNC: 0.97 MG/DL (ref 0.5–1.3)
CREAT SERPL-MCNC: 1.04 MG/DL (ref 0.5–1.3)
ERYTHROCYTE [DISTWIDTH] IN BLOOD BY AUTOMATED COUNT: 12.3 % (ref 11.5–14.5)
GFR SERPL CREATININE-BSD FRML MDRD: 74 ML/MIN/1.73M*2
GFR SERPL CREATININE-BSD FRML MDRD: 80 ML/MIN/1.73M*2
GLUCOSE SERPL-MCNC: 110 MG/DL (ref 74–99)
GLUCOSE SERPL-MCNC: 90 MG/DL (ref 74–99)
HCT VFR BLD AUTO: 32.8 % (ref 41–52)
HGB BLD-MCNC: 10.2 G/DL (ref 13.5–17.5)
MCH RBC QN AUTO: 27.3 PG (ref 26–34)
MCHC RBC AUTO-ENTMCNC: 31.1 G/DL (ref 32–36)
MCV RBC AUTO: 88 FL (ref 80–100)
NRBC BLD-RTO: 0 /100 WBCS (ref 0–0)
PLATELET # BLD AUTO: 229 X10*3/UL (ref 150–450)
PMV BLD AUTO: 11 FL (ref 7.5–11.5)
POTASSIUM SERPL-SCNC: 3.9 MMOL/L (ref 3.5–5.3)
POTASSIUM SERPL-SCNC: 4 MMOL/L (ref 3.5–5.3)
RBC # BLD AUTO: 3.74 X10*6/UL (ref 4.5–5.9)
SODIUM SERPL-SCNC: 139 MMOL/L (ref 136–145)
SODIUM SERPL-SCNC: 140 MMOL/L (ref 136–145)
WBC # BLD AUTO: 8 X10*3/UL (ref 4.4–11.3)

## 2023-10-01 PROCEDURE — 99233 SBSQ HOSP IP/OBS HIGH 50: CPT | Performed by: INTERNAL MEDICINE

## 2023-10-01 PROCEDURE — G0378 HOSPITAL OBSERVATION PER HR: HCPCS

## 2023-10-01 PROCEDURE — 36415 COLL VENOUS BLD VENIPUNCTURE: CPT | Performed by: INTERNAL MEDICINE

## 2023-10-01 PROCEDURE — 2500000004 HC RX 250 GENERAL PHARMACY W/ HCPCS (ALT 636 FOR OP/ED): Mod: MUE | Performed by: INTERNAL MEDICINE

## 2023-10-01 PROCEDURE — 85027 COMPLETE CBC AUTOMATED: CPT | Performed by: INTERNAL MEDICINE

## 2023-10-01 PROCEDURE — 2500000001 HC RX 250 WO HCPCS SELF ADMINISTERED DRUGS (ALT 637 FOR MEDICARE OP): Performed by: INTERNAL MEDICINE

## 2023-10-01 PROCEDURE — 80048 BASIC METABOLIC PNL TOTAL CA: CPT | Performed by: INTERNAL MEDICINE

## 2023-10-01 RX ADMIN — FERROUS SULFATE TAB 325 MG (65 MG ELEMENTAL FE) 65 MG OF IRON: 325 (65 FE) TAB at 10:59

## 2023-10-01 RX ADMIN — POTASSIUM CHLORIDE 10 MEQ: 750 TABLET, EXTENDED RELEASE ORAL at 10:59

## 2023-10-01 RX ADMIN — ATORVASTATIN CALCIUM 20 MG: 20 TABLET, FILM COATED ORAL at 22:21

## 2023-10-01 RX ADMIN — CARBIDOPA AND LEVODOPA 1 TABLET: 25; 100 TABLET ORAL at 19:06

## 2023-10-01 RX ADMIN — CARBIDOPA AND LEVODOPA 1 TABLET: 25; 100 TABLET ORAL at 10:59

## 2023-10-01 RX ADMIN — CARVEDILOL 6.25 MG: 6.25 TABLET, FILM COATED ORAL at 10:58

## 2023-10-01 RX ADMIN — FUROSEMIDE 40 MG: 40 TABLET ORAL at 10:57

## 2023-10-01 RX ADMIN — CARBIDOPA AND LEVODOPA 1 TABLET: 25; 100 TABLET ORAL at 22:21

## 2023-10-01 RX ADMIN — Medication 1 TABLET: at 10:56

## 2023-10-01 RX ADMIN — CARVEDILOL 6.25 MG: 6.25 TABLET, FILM COATED ORAL at 22:21

## 2023-10-01 RX ADMIN — LISINOPRIL 5 MG: 5 TABLET ORAL at 10:58

## 2023-10-01 RX ADMIN — DOCUSATE SODIUM 100 MG: 100 CAPSULE, LIQUID FILLED ORAL at 22:21

## 2023-10-01 RX ADMIN — DOCUSATE SODIUM 100 MG: 100 CAPSULE, LIQUID FILLED ORAL at 10:57

## 2023-10-01 RX ADMIN — Medication 3 MG: at 22:21

## 2023-10-01 RX ADMIN — OXYCODONE HYDROCHLORIDE 5 MG: 5 TABLET ORAL at 22:21

## 2023-10-01 RX ADMIN — RIVAROXABAN 20 MG: 20 TABLET, FILM COATED ORAL at 19:06

## 2023-10-01 RX ADMIN — OXYCODONE HYDROCHLORIDE 5 MG: 5 TABLET ORAL at 05:29

## 2023-10-01 RX ADMIN — CEFTRIAXONE 2 G: 2 INJECTION, POWDER, FOR SOLUTION INTRAMUSCULAR; INTRAVENOUS at 23:21

## 2023-10-01 ASSESSMENT — COGNITIVE AND FUNCTIONAL STATUS - GENERAL
DAILY ACTIVITIY SCORE: 13
MOVING TO AND FROM BED TO CHAIR: A LOT
MOVING FROM LYING ON BACK TO SITTING ON SIDE OF FLAT BED WITH BEDRAILS: A LITTLE
TURNING FROM BACK TO SIDE WHILE IN FLAT BAD: A LITTLE
WALKING IN HOSPITAL ROOM: A LOT
EATING MEALS: A LITTLE
HELP NEEDED FOR BATHING: A LOT
PERSONAL GROOMING: A LOT
DRESSING REGULAR LOWER BODY CLOTHING: A LOT
STANDING UP FROM CHAIR USING ARMS: A LOT
MOBILITY SCORE: 13
DRESSING REGULAR UPPER BODY CLOTHING: A LOT
TOILETING: A LOT
CLIMB 3 TO 5 STEPS WITH RAILING: TOTAL

## 2023-10-01 ASSESSMENT — PAIN SCALES - GENERAL
PAINLEVEL_OUTOF10: 6

## 2023-10-01 NOTE — PROGRESS NOTES
Paras Mclaughlin is a 78 y.o. male on day 0 of admission presenting with Weakness.      Subjective   Pt seen and examined.        Objective     Last Recorded Vitals  /75   Pulse 66   Temp 36.7 °C (98 °F)   Resp 14   Wt 95 kg (209 lb 7 oz)   SpO2 96%   Intake/Output last 3 Shifts:    Intake/Output Summary (Last 24 hours) at 10/1/2023 1200  Last data filed at 10/1/2023 1023  Gross per 24 hour   Intake 300 ml   Output --   Net 300 ml       Admission Weight  Weight: 95 kg (209 lb 7 oz) (09/29/23 1241)    Daily Weight  09/29/23 : 95 kg (209 lb 7 oz)    Image Results  XR chest 1 view  Narrative: Interpreted By:  ESPINOZA BARBER MD  MRN: [id number]  Patient Name: PARAS MCLAUGHLIN     STUDY:  CHEST 1 VIEW     INDICATION:  weakness. Hx CHF .     COMPARISON:  August 29     ACCESSION NUMBER(S):  [id number]     ORDERING CLINICIAN:  TL MCLAUGHLIN     FINDINGS:  Cardiomegaly with tortuous aorta, slightly more prominent due to the  low lung volumes.     No consolidation, effusion, edema, or pneumothorax.     Impression: Low lung volumes but no evidence of acute intrathoracic abnormality.      Physical Exam    Constitutional: Elderly gentleman, A&Ox2 conversational, but poor historian alert, active, cooperative not in acute distress  Eyes: Pupils are reactive to light, clear sclera  ENMT: Moist mucosal membranes, no exudates  Head/Neck: Normocephalic, atraumatic, supple neck, JVP not visualized  Respiratory/Thorax: Patent airways, normal breath sounds, no wheezing appreciated  Cardiovascular: RRR, S1S2, no murmurs appreciated, palpable pulses in all extremities  Gastrointestinal: Soft, not tender, not distended, normoactive bowel sounds  Musculoskeletal: Moves upper extremities freely, resting tremor present, appropriate strength, mild ankle peripheral edema  Extremities: Resting tremor bilaterally of the hip extremities, trace bilateral lower extremity peripheral edema  Neurological: Grossly intact  Lymphatic: No acute palpable  cervical lymphadenopathy      Relevant Results               Assessment/Plan        78 year old Male with past medical history of paroxysmal atrial fibrillation (on Xarelto), CHF, hyperlipidemia, prostate cancer, essential tremor, was brought to [facility] ED for worsening weakness and found to have a UTI    # UTI: Acute  -Ceftriaxone 2 g IV piggyback daily  -Urine culture result pending    # Weakness: Gradual onset, worsening in the setting of UTI  -PT OT  -Son agreed to placement if necessary    # Chronic diastolic heart failure: Stable, likely at baseline with peripheral edema  -Monitor for signs of decompensation  -Continue home regimen    # Atrial fibrillation  -On Xarelto 20 mg daily  -Carvedilol 6.25 mg twice daily    # 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

## 2023-10-01 NOTE — CARE PLAN
The clinical goals for the shift include    Problem: Safety  Goal: I will remain free of falls  Outcome: Progressing     Problem: Daily Care  Goal: Daily care needs are met  Outcome: Progressing     Problem: Skin  Goal: Prevent/manage excess moisture  Outcome: Progressing  Goal: Prevent/minimize sheer/friction injuries  Outcome: Progressing

## 2023-10-01 NOTE — CARE PLAN
Plan of Care Transcription    Patient Name: Shashi Gutierrez  MRN: [id number]  Today's Date: 10/1/2023    Assessment/Plan   OT Assessment  IP Plan  Treatment Interventions:  (ADL retraining; balance training; bed mobility training; neuromuscular re-education; transfer training; motor coordination training)  OT Frequency: 3 times per week  OT Discharge Recommendations: Moderate intensity level of continued care  Equipment Recommended upon Discharge:  (dressing equipment)      Problem: Balance  Goal: Balance- Goal transcribed from [facility]  Description: Balance: Established Date 29-Sep-2023  Balance: Goal Details Patient to improve dynamic standing balance to good in order to complete ADLs without LOB.  Balance: Time Frame for Goal 2 wks    Outcome: Progressing     Problem: Bathing  Goal: Bathing Upper Body- Goal transcribed from [facility]  Description: Bathing Upper Body: Established Date 29-Sep-2023  Bathing Upper Body: [place] Level Goal contact guard  Bathing Upper Body: Time Frame for Goal 2 wks    Outcome: Progressing  Goal: Bathing Lower Body- Goal transcribed from Care  Description: Bathing Lower Body: Established Date 29-Sep-2023  Bathing Lower Body: [place] Level Goal contact guard  Bathing Lower Body: Time Frame for Goal 2 wks    Outcome: Progressing     Problem: Dressings Lower Extremities  Goal: Lower Body Dressing- Goal transcribed from Care  Description: Lower Body Dressing: Established Date 29-Sep-2023  Lower Body Dressing: [place] Level Goal minimum assist (75% patients effort)  Lower Body Dressing: Assistive Device Goal dressing stick; reacher; sock-aid  Lower Body Dressing: Time Frame for Goal 2 wks    Outcome: Progressing     Problem: Dressing Upper Extremities  Goal: Upper Body Dressing- Goal transcribed from [facility]  Description: Upper Body Dressing: Established Date 29-Sep-2023  Upper Body Dressing: [place] Level Goal contact guard  Upper Body Dressing: Time Frame for Goal 2  wks  Outcome: Progressing     Problem: Grooming  Goal: Grooming- Goal transcribed from [facility]  Description: Grooming: Established Date 29-Sep-2023  Grooming: [place] Level Goal contact guard  Grooming: Time Frame for Goal 2 wks    Outcome: Progressing     Problem: Instrumental Activities of Daily Living  Goal: Energy Conservation- Goal transcribed from [facility]  Description: Energy Conservation: Established Date 29-Sep-2023  Energy Conservation: Goal Details Patient to tolerate moderate activity with min rest periods and demo/verbalize energy conservation techniques.  Energy Conservation: Time Frame for Goal 2 wks    Outcome: Progressing     Problem: Mobility  Goal: Standing Tolerance- Goal transcribed from [facility]  Description: Standing Tolerance: Established Date 29-Sep-2023  Standing Tolerance: Goal Details Patient to tolerate standing 5-6 minutes for safe completion of ADLS.  Standing Tolerance: Time Frame for Goal 2 wks    Outcome: Progressing     Problem: Transfers  Goal: Bed Mobility- Goal transcribed from [facility]  Description: Bed Mobility: Date Established 29-Sep-2023  Bed Mobility: [place] Level Goal minimum assist (75% patients effort)  Bed Mobility: Time Frame for Goal 2 wks    Outcome: Progressing  Goal: Functional Transfer- Goal transcribed from [facility]  Description: Functional Transfer: Established Date 29-Sep-2023  Functional Transfer: Goal Details Patient to perform all functional transfers at [facility] with wheeled walker.  Functional Transfer: Time Frame for Goal 2 wks    Outcome: Progressing

## 2023-10-02 LAB
ANION GAP SERPL CALC-SCNC: 12 MMOL/L (ref 10–20)
BUN SERPL-MCNC: 15 MG/DL (ref 6–23)
CALCIUM SERPL-MCNC: 8.7 MG/DL (ref 8.6–10.3)
CHLORIDE SERPL-SCNC: 102 MMOL/L (ref 98–107)
CO2 SERPL-SCNC: 29 MMOL/L (ref 21–32)
CREAT SERPL-MCNC: 0.94 MG/DL (ref 0.5–1.3)
ERYTHROCYTE [DISTWIDTH] IN BLOOD BY AUTOMATED COUNT: 12.3 % (ref 11.5–14.5)
GFR SERPL CREATININE-BSD FRML MDRD: 83 ML/MIN/1.73M*2
GLUCOSE SERPL-MCNC: 96 MG/DL (ref 74–99)
HCT VFR BLD AUTO: 31 % (ref 41–52)
HGB BLD-MCNC: 9.7 G/DL (ref 13.5–17.5)
MCH RBC QN AUTO: 27.4 PG (ref 26–34)
MCHC RBC AUTO-ENTMCNC: 31.3 G/DL (ref 32–36)
MCV RBC AUTO: 88 FL (ref 80–100)
NRBC BLD-RTO: 0 /100 WBCS (ref 0–0)
PLATELET # BLD AUTO: 243 X10*3/UL (ref 150–450)
PMV BLD AUTO: 11.2 FL (ref 7.5–11.5)
POTASSIUM SERPL-SCNC: 3.8 MMOL/L (ref 3.5–5.3)
RBC # BLD AUTO: 3.54 X10*6/UL (ref 4.5–5.9)
SODIUM SERPL-SCNC: 139 MMOL/L (ref 136–145)
WBC # BLD AUTO: 6.9 X10*3/UL (ref 4.4–11.3)

## 2023-10-02 PROCEDURE — G0378 HOSPITAL OBSERVATION PER HR: HCPCS

## 2023-10-02 PROCEDURE — 97116 GAIT TRAINING THERAPY: CPT | Mod: GP

## 2023-10-02 PROCEDURE — 2500000001 HC RX 250 WO HCPCS SELF ADMINISTERED DRUGS (ALT 637 FOR MEDICARE OP): Performed by: INTERNAL MEDICINE

## 2023-10-02 PROCEDURE — 36415 COLL VENOUS BLD VENIPUNCTURE: CPT | Performed by: INTERNAL MEDICINE

## 2023-10-02 PROCEDURE — 80048 BASIC METABOLIC PNL TOTAL CA: CPT | Performed by: INTERNAL MEDICINE

## 2023-10-02 PROCEDURE — 99233 SBSQ HOSP IP/OBS HIGH 50: CPT | Performed by: INTERNAL MEDICINE

## 2023-10-02 PROCEDURE — 2500000004 HC RX 250 GENERAL PHARMACY W/ HCPCS (ALT 636 FOR OP/ED): Mod: MUE | Performed by: INTERNAL MEDICINE

## 2023-10-02 PROCEDURE — 85027 COMPLETE CBC AUTOMATED: CPT | Performed by: INTERNAL MEDICINE

## 2023-10-02 PROCEDURE — 97530 THERAPEUTIC ACTIVITIES: CPT | Mod: GP

## 2023-10-02 PROCEDURE — 97535 SELF CARE MNGMENT TRAINING: CPT | Mod: GO

## 2023-10-02 RX ADMIN — Medication 1 TABLET: at 09:46

## 2023-10-02 RX ADMIN — CARVEDILOL 6.25 MG: 6.25 TABLET, FILM COATED ORAL at 21:01

## 2023-10-02 RX ADMIN — RIVAROXABAN 20 MG: 20 TABLET, FILM COATED ORAL at 18:19

## 2023-10-02 RX ADMIN — CARBIDOPA AND LEVODOPA 1 TABLET: 25; 100 TABLET ORAL at 21:01

## 2023-10-02 RX ADMIN — CARBIDOPA AND LEVODOPA 1 TABLET: 25; 100 TABLET ORAL at 09:46

## 2023-10-02 RX ADMIN — POTASSIUM CHLORIDE 10 MEQ: 750 TABLET, EXTENDED RELEASE ORAL at 09:46

## 2023-10-02 RX ADMIN — CARVEDILOL 6.25 MG: 6.25 TABLET, FILM COATED ORAL at 09:46

## 2023-10-02 RX ADMIN — DOCUSATE SODIUM 100 MG: 100 CAPSULE, LIQUID FILLED ORAL at 21:01

## 2023-10-02 RX ADMIN — ATORVASTATIN CALCIUM 20 MG: 20 TABLET, FILM COATED ORAL at 21:01

## 2023-10-02 RX ADMIN — LISINOPRIL 5 MG: 5 TABLET ORAL at 09:46

## 2023-10-02 RX ADMIN — CARBIDOPA AND LEVODOPA 1 TABLET: 25; 100 TABLET ORAL at 15:10

## 2023-10-02 RX ADMIN — CEFTRIAXONE 2 G: 2 INJECTION, POWDER, FOR SOLUTION INTRAMUSCULAR; INTRAVENOUS at 23:25

## 2023-10-02 RX ADMIN — FUROSEMIDE 40 MG: 40 TABLET ORAL at 09:46

## 2023-10-02 RX ADMIN — OXYCODONE HYDROCHLORIDE 5 MG: 5 TABLET ORAL at 21:04

## 2023-10-02 RX ADMIN — DOCUSATE SODIUM 100 MG: 100 CAPSULE, LIQUID FILLED ORAL at 09:46

## 2023-10-02 RX ADMIN — FERROUS SULFATE TAB 325 MG (65 MG ELEMENTAL FE) 65 MG OF IRON: 325 (65 FE) TAB at 09:46

## 2023-10-02 ASSESSMENT — COGNITIVE AND FUNCTIONAL STATUS - GENERAL
TOILETING: A LITTLE
CLIMB 3 TO 5 STEPS WITH RAILING: TOTAL
DRESSING REGULAR LOWER BODY CLOTHING: A LOT
MOVING TO AND FROM BED TO CHAIR: A LOT
DAILY ACTIVITIY SCORE: 16
HELP NEEDED FOR BATHING: A LOT
TURNING FROM BACK TO SIDE WHILE IN FLAT BAD: A LOT
MOBILITY SCORE: 12
PERSONAL GROOMING: A LITTLE
STANDING UP FROM CHAIR USING ARMS: A LOT
DRESSING REGULAR UPPER BODY CLOTHING: A LOT
WALKING IN HOSPITAL ROOM: A LITTLE
MOVING FROM LYING ON BACK TO SITTING ON SIDE OF FLAT BED WITH BEDRAILS: A LOT

## 2023-10-02 ASSESSMENT — PAIN SCALES - GENERAL
PAINLEVEL_OUTOF10: 0 - NO PAIN
PAINLEVEL_OUTOF10: 6
PAINLEVEL_OUTOF10: 6

## 2023-10-02 ASSESSMENT — PAIN - FUNCTIONAL ASSESSMENT
PAIN_FUNCTIONAL_ASSESSMENT: 0-10

## 2023-10-02 ASSESSMENT — ACTIVITIES OF DAILY LIVING (ADL): HOME_MANAGEMENT_TIME_ENTRY: 28

## 2023-10-02 NOTE — CARE PLAN
Problem: Balance  Goal: Balance- Goal transcribed from Care  Description: Balance: Established Date 29-Sep-2023  Balance: Goal Details Patient to improve dynamic standing balance to good in order to complete ADLs without LOB.  Balance: Time Frame for Goal 2 wks    Outcome: Progressing     Problem: Bathing  Goal: Bathing Upper Body- Goal transcribed from Care  Description: Bathing Upper Body: Established Date 29-Sep-2023  Bathing Upper Body: [place] Level Goal contact guard  Bathing Upper Body: Time Frame for Goal 2 wks    Outcome: Progressing  Goal: Bathing Lower Body- Goal transcribed from [facility]  Description: Bathing Lower Body: Established Date 29-Sep-2023  Bathing Lower Body: [place] Level Goal contact guard  Bathing Lower Body: Time Frame for Goal 2 wks    Outcome: Progressing     Problem: Dressings Lower Extremities  Goal: Lower Body Dressing- Goal transcribed from Care  Description: Lower Body Dressing: Established Date 29-Sep-2023  Lower Body Dressing: [place] Level Goal minimum assist (75% patients effort)  Lower Body Dressing: Assistive Device Goal dressing stick; reacher; sock-aid  Lower Body Dressing: Time Frame for Goal 2 wks    Outcome: Progressing     Problem: Dressing Upper Extremities  Goal: Upper Body Dressing- Goal transcribed from Care  Description: Upper Body Dressing: Established Date 29-Sep-2023  Upper Body Dressing: [place] Level Goal contact guard  Upper Body Dressing: Time Frame for Goal 2 wks  Outcome: Progressing     Problem: Grooming  Goal: Grooming- Goal transcribed from [facility]  Description: Grooming: Established Date 29-Sep-2023  Grooming: [place] Level Goal contact guard  Grooming: Time Frame for Goal 2 wks    Outcome: Progressing     Problem: Instrumental Activities of Daily Living  Goal: Energy Conservation- Goal transcribed from [facility]  Description: Energy Conservation: Established Date 29-Sep-2023  Energy Conservation: Goal Details Patient to  tolerate moderate activity with min rest periods and demo/verbalize energy conservation techniques.  Energy Conservation: Time Frame for Goal 2 wks    Outcome: Progressing     Problem: Mobility  Goal: Standing Tolerance- Goal transcribed from [facility]  Description: Standing Tolerance: Established Date 29-Sep-2023  Standing Tolerance: Goal Details Patient to tolerate standing 5-6 minutes for safe completion of ADLS.  Standing Tolerance: Time Frame for Goal 2 wks    Outcome: Progressing     Problem: Transfers  Goal: Bed Mobility- Goal transcribed from [facility]  Description: Bed Mobility: Date Established 29-Sep-2023  Bed Mobility: [place] Level Goal minimum assist (75% patients effort)  Bed Mobility: Time Frame for Goal 2 wks    Outcome: Progressing  Goal: Functional Transfer- Goal transcribed from [facility]  Description: Functional Transfer: Established Date 29-Sep-2023  Functional Transfer: Goal Details Patient to perform all functional transfers at [facility] with wheeled walker.  Functional Transfer: Time Frame for Goal 2 wks    Outcome: Progressing

## 2023-10-02 NOTE — PROGRESS NOTES
Occupational Therapy    Occupational Therapy Treatment    Name: Shashi Gutierrez  MRN: [id number]  : 1945  Date: 10/02/23  Time Calculation  Start Time: 1404  Stop Time: 1432  Time Calculation (min): 28 min    Assessment:  OT Assessment:  (Patient still is at risk for falls, increased time required for all transitional movements. Patient at risk for falls due to narrow base of support and max cues for hand placement. Increased assist with LB ADLs due to decreased functional reach.)  Prognosis: Good  Barriers to Discharge: Decreased caregiver support  Evaluation/Treatment Tolerance: Patient limited by fatigue  Plan:  Treatment Interventions: ADL retraining, Functional transfer training, Endurance training, Patient/family training, Equipment evaluation/education  OT Frequency: 3 times per week  OT Discharge Recommendations: Moderate intensity level of continued care  Equipment Recommended upon Discharge:  (Hip Kit)    Subjective   General:  OT Last Visit  OT Received On: 10/02/23  General  Reason for Referral:  (General Weakness)  Referred By: Dr. Franklin  Past Medical History Relevant to Rehab: paroxysmal atrial fibrillation (on Xarelto), CHF, hyperlipidemia, prostate cancer, essential tremor  Prior to Session Communication: Bedside nurse (RN cleared pt for treatment session)  Patient Position Received: Up in chair, Alarm on  General Comment:  (Patient requires increased time to complete all tasks.)  Pain Assessment:  Pain Assessment  Pain Assessment: 0-10  Pain Score: 6  Pain Type: Chronic pain  Pain Location: Knee     Objective   Activities of Daily Living: Grooming  Grooming Level of Assistance: Setup  Grooming Where Assessed: Edge of bed  Grooming Comments:  (Washed face on EOB.)    UE Dressing  UE Dressing Level of Assistance: Moderate assistance  UE Dressing Where Assessed: Edge of bed  UE Dressing Comments:  (Assist with hospital gown around shoulders.)         Functional Standing Tolerance:     Bed  Mobility/Transfers: Bed Mobility  Bed Mobility: Yes  Bed Mobility 1  Bed Mobility 1: Supine to sitting  Level of Assistance 1: Moderate assistance  Bed Mobility Comments 1:  (Assist with upper trunk and increased time required.)  Bed Mobility 2  Bed Mobility  2: Sitting to supine  Level of Assistance 2: Moderate assistance    Transfers  Transfer: Yes  Transfer 1  Transfer From 1: Commode-standard to  Transfer to 1: Bed  Technique 1: Sit to stand  Transfer Device 1: Walker  Transfer Level of Assistance 1: Moderate assistance  Trials/Comments 1:  (Increased time required.)  Transfers 2  Transfer From 2: Bed to  Transfer to 2: Chair with arms  Technique 2: Sit to stand  Transfer Device 2: Walker  Transfer Level of Assistance 2: Moderate assistance  Trials/Comments 2:  (Cues for hand placement.)    Toilet Transfers  Toilet Transfer From: Bed  Toilet Transfer Type: To and from  Toilet Transfer to: Standard bedside commode  Toilet Transfer Technique: Stand pivot  Toilet Transfers: Moderate assistance    Outcome Measures:  [facility] Daily Activity  Putting on and taking off regular lower body clothing: A lot  Bathing (including washing, rinsing, drying): A lot  Putting on and taking off regular upper body clothing: A lot  Toileting, which includes using toilet, bedpan or urinal: A little  Taking care of personal grooming such as brushing teeth: A little  Eating Meals: None  Daily Activity - Total Score: 16        Education Documentation  ADL Training, taught by Adair Knox OT at 10/2/2023  2:49 PM.  Learner: Patient  Readiness: Acceptance  Method: Demonstration  Response: Needs Reinforcement  Comment: Patient educated regarding use of LE adaptive equipment to complete LB ADLs, reinforcement required.    Education Comments  No comments found.      Goals:  Encounter Problems       Encounter Problems (Active)       Balance       Balance- Goal transcribed from [facility] (Progressing)       Start:  10/01/23    Expected End:   10/13/23       Balance: Established Date 29-Sep-2023  Balance: Goal Details Patient to improve dynamic standing balance to good in order to complete ADLs without LOB.  Balance: Time Frame for Goal 2 wks              Bathing       Bathing Upper Body- Goal transcribed from [facility] (Progressing)       Start:  10/01/23    Expected End:  10/13/23       Bathing Upper Body: Established Date 29-Sep-2023  Bathing Upper Body: [place] Level Goal contact guard  Bathing Upper Body: Time Frame for Goal 2 wks           Bathing Lower Body- Goal transcribed from [facility] (Progressing)       Start:  10/01/23    Expected End:  10/13/23       Bathing Lower Body: Established Date 29-Sep-2023  Bathing Lower Body: [place] Goal contact guard  Bathing Lower Body: Time Frame for Goal 2 wks              Dressing Upper Extremities       Upper Body Dressing- Goal transcribed from [facility] (Progressing)       Start:  10/01/23    Expected End:  10/13/23       Upper Body Dressing: Established Date 29-Sep-2023  Upper Body Dressing: [place] Goal contact guard  Upper Body Dressing: Time Frame for Goal 2 wks            Dressings Lower Extremities       Lower Body Dressing- Goal transcribed from [facility] (Progressing)       Start:  10/01/23    Expected End:  10/13/23       Lower Body Dressing: Established Date 29-Sep-2023  Lower Body Dressing: [place] Level Goal minimum assist (75% patients effort)  Lower Body Dressing: Assistive Device Goal dressing stick; reacher; sock-aid  Lower Body Dressing: Time Frame for Goal 2 wks              Grooming       Grooming- Goal transcribed from [facility] (Progressing)       Start:  10/01/23    Expected End:  10/13/23       Grooming: Established Date 29-Sep-2023  Grooming: [place] Level Goal contact guard  Grooming: Time Frame for Goal 2 wks              Instrumental Activities of Daily Living       Energy Conservation- Goal transcribed from [facility] (Progressing)       Start:  10/01/23     Expected End:  10/13/23       Energy Conservation: Established Date 29-Sep-2023  Energy Conservation: Goal Details Patient to tolerate moderate activity with min rest periods and demo/verbalize energy conservation techniques.  Energy Conservation: Time Frame for Goal 2 wks              Mobility       STG - Patient will ambulate (Progressing)       Start:  09/30/23    Expected End:  10/14/23       >20ft using FWW with CGA            Mobility       Standing Tolerance- Goal transcribed from [facility] (Progressing)       Start:  10/01/23    Expected End:  10/13/23       Standing Tolerance: Established Date 29-Sep-2023  Standing Tolerance: Goal Details Patient to tolerate standing 5-6 minutes for safe completion of ADLS.  Standing Tolerance: Time Frame for Goal 2 wks              Strength       Pt will demonstrate ability to complete dynamic seated activities (LE therapeutic exercises) over the course of 10 min with SBA      Goal 1 (Progressing)       Start:  09/30/23    Expected End:  10/14/23               Transfers       STG - Patient will perform bed mobility (Progressing)       Start:  09/30/23    Expected End:  10/14/23       SBA         STG - Patient will transfer sit to and from stand (Progressing)       Start:  09/30/23    Expected End:  10/14/23       CGA with no verbal cues for sequencing            Transfers       Bed Mobility- Goal transcribed from [facility] (Progressing)       Start:  10/01/23    Expected End:  10/13/23       Bed Mobility: Date Established 29-Sep-2023  Bed Mobility: [place] Level Goal minimum assist (75% patients effort)  Bed Mobility: Time Frame for Goal 2 wks           Functional Transfer- Goal transcribed from [facility] (Progressing)       Start:  10/01/23    Expected End:  10/13/23       Functional Transfer: Established Date 29-Sep-2023  Functional Transfer: Goal Details Patient to perform all functional transfers at [facility] with wheeled walker.  Functional Transfer: Time Frame for Goal 2 wks

## 2023-10-02 NOTE — PROGRESS NOTES
Paras Mclaughlin is a 78 y.o. male on day 0 of admission presenting with Weakness.      Subjective   Pt seen and examined.        Objective     Last Recorded Vitals  /74   Pulse 67   Temp 36.4 °C (97.5 °F)   Resp 18   Wt 95 kg (209 lb 7 oz)   SpO2 96%   Intake/Output last 3 Shifts:    Intake/Output Summary (Last 24 hours) at 10/2/2023 1235  Last data filed at 10/1/2023 1740  Gross per 24 hour   Intake --   Output 850 ml   Net -850 ml         Admission Weight  Weight: 95 kg (209 lb 7 oz) (09/29/23 1241)    Daily Weight  09/29/23 : 95 kg (209 lb 7 oz)    Image Results  XR chest 1 view  Narrative: Interpreted By:  ESPINOZA BARBER MD  MRN: [id number]  Patient Name: PARAS MCLAUGHLIN     STUDY:  CHEST 1 VIEW     INDICATION:  weakness. Hx CHF .     COMPARISON:  August 29     ACCESSION NUMBER(S):  [id number]     ORDERING CLINICIAN:  TL MCLAUGHLIN     FINDINGS:  Cardiomegaly with tortuous aorta, slightly more prominent due to the  low lung volumes.     No consolidation, effusion, edema, or pneumothorax.     Impression: Low lung volumes but no evidence of acute intrathoracic abnormality.      Physical Exam    Constitutional: Elderly gentleman, A&Ox2 conversational, but poor historian alert, active, cooperative not in acute distress  Eyes: Pupils are reactive to light, clear sclera  ENMT: Moist mucosal membranes, no exudates  Head/Neck: Normocephalic, atraumatic, supple neck, JVP not visualized  Respiratory/Thorax: Patent airways, normal breath sounds, no wheezing appreciated  Cardiovascular: RRR, S1S2, no murmurs appreciated, palpable pulses in all extremities  Gastrointestinal: Soft, not tender, not distended, normoactive bowel sounds  Musculoskeletal: Moves upper extremities freely, resting tremor present, appropriate strength, mild ankle peripheral edema  Extremities: Resting tremor bilaterally of the hip extremities, trace bilateral lower extremity peripheral edema  Neurological: Grossly intact  Lymphatic: No acute palpable  cervical lymphadenopathy      Relevant Results               Assessment/Plan        78 year old Male with past medical history of paroxysmal atrial fibrillation (on Xarelto), CHF, hyperlipidemia, prostate cancer, essential tremor, was brought to [facility] ED for worsening weakness and found to have a UTI    # UTI: Acute  -Ceftriaxone 2 g IV piggyback daily  -Urine culture result pending    # Weakness: Gradual onset, worsening in the setting of UTI  -PT OT  -Son agreed to placement if necessary    # Chronic diastolic heart failure: Stable, likely at baseline with peripheral edema  -Monitor for signs of decompensation  -Continue home regimen    # Atrial fibrillation  -On Xarelto 20 mg daily  -Carvedilol 6.25 mg twice daily    # 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

## 2023-10-02 NOTE — CONSULTS
Wound Care Consult     Visit Date: 10/2/2023      Patient Name: Shashi Gutierrez         MRN: [id number]           YOB: 1945     Reason for Consult: Deep tissue injury to the left medial buttock POA        Wound History: unknown     Pertinent Labs:   Albumin   Date Value Ref Range Status   08/29/2023 4.0 3.4 - 5.0 g/dL Final       Wound Assessment:  Wound 10/02/23 Pressure Injury Buttocks Left;Medial (Active)   Site Assessment Purple;Non-blanchable erythema 10/02/23 1542   [place]-Wound Assessment Clean;Intact 10/02/23 1542   Non-staged Wound Description Not applicable 10/02/23 1542   Pressure Injury Stage DTPI 10/02/23 1542   Shape circular 10/02/23 1542   Wound Length (cm) 2 cm 10/02/23 1542   Wound Width (cm) 2 cm 10/02/23 1542   Wound Surface Area (cm^2) 4 cm^2 10/02/23 1542   Wound Depth (cm) 0 cm 10/02/23 1542   Wound Volume (cm^3) 0 cm^3 10/02/23 1542   Dressing Open to air 10/02/23 1542       Wound Team Summary Assessment: Patient presents with a deep tissue injury to the left medial buttock POA.  Patient encouraged to turn frequently and offload the area.       Wound Team Plan: Bedside RN/LPN to encourage frequent turns while in bed.       Jenna Owens RN [facility]   Phone: [phone]  10/2/2023  3:43 PM

## 2023-10-02 NOTE — PROGRESS NOTES
Physical Therapy    Physical Therapy Treatment    Patient Name: Shashi Gutierrez  MRN: [id number]  Today's Date: 10/2/2023  Time Calculation  Start Time: 0905  Stop Time: 0931  Time Calculation (min): 26 min       Assessment/Plan   PT Assessment  PT Assessment Results: Decreased strength, Decreased endurance, Impaired balance, Decreased mobility, Decreased cognition, Impaired judgement, Decreased safety awareness  Rehab Prognosis: Good  Barriers to Discharge: Cognitive deficits; requiring substantial assistance for all mobility  Evaluation/Treatment Tolerance: Patient limited by fatigue  Medical Staff Made Aware: Yes  Strengths: Other (Comment) (pt motivated)  Barriers to Participation: Other (Comment) (cognitive deficits)  End of Session Communication: Bedside nurse (RN notified of pt performance and status)  Assessment Comment: PT session focused on bed mobility, transfers (blocked practice of STS) and gait training using FWW. Pt limited by strength and balance deficits.  End of Session Patient Position: Up in chair, Alarm on     PT Plan  Treatment/Interventions: Bed mobility, Transfer training, Gait training, Balance training, Neuromuscular re-education, Stair training, Strengthening, Endurance training, Therapeutic exercise, Therapeutic activity, Home exercise program  PT Plan: Skilled PT  PT Frequency: 3 times per week  PT Discharge Recommendations: Moderate intensity level of continued care  Equipment Recommended upon Discharge:  (dressing equipment)  PT Recommended Transfer Status: Assist x1      General Visit Information:   PT  Visit  PT Received On: 10/02/23  Response to Previous Treatment: Patient with no complaints from previous session. (Pt reports feeling more weakness this date compared to previous session)  General  Reason for Referral: Gen weakness  Referred By: Dr. Franklin  Past Medical History Relevant to Rehab: paroxysmal atrial fibrillation (on Xarelto), CHF, hyperlipidemia, prostate cancer,  essential tremor  Prior to Session Communication: Bedside nurse (RN cleared pt for treatment session)  Patient Position Received: Bed, 3 rail up, Alarm on    Subjective   Precautions:  Precautions  Medical Precautions: Fall precautions  Vital Signs:       Objective   Pain:  Pain Assessment  Pain Assessment: 0-10  Pain Score: 0 - No pain (Pt reports pain related to arthritis 10/10 in L knee during standing transfers however no pain at rest)  Cognition:  Cognition  Overall Cognitive Status: Impaired at baseline  Memory: Exceptions to WFL  Safety/Judgement: Exceptions to WFL    Activity Tolerance:  Activity Tolerance  Endurance: Tolerates 10 - 20 min exercise with multiple rests  Treatments:  Therapeutic Exercise  Therapeutic Exercise Performed:  (Pt completed x10 reps of B standing marches using BUE support on FWW with min A)    Therapeutic Activity  Therapeutic Activity Performed: Yes  Therapeutic Activity 1: Completed blocked STS tranfers with mod A x8 reps total with seated rest break between session. Manual assist to shift weight in anterior to superior direction. Verbal cues for sequencing    Bed Mobility  Bed Mobility: Yes  Bed Mobility 1  Bed Mobility 1: Supine to sitting  Level of Assistance 1: Moderate assistance  Bed Mobility Comments 1: Manual assist for BLEs and BUEs    Ambulation/Gait Training  Ambulation/Gait Training Performed: Yes  Ambulation/Gait Training 1  Surface 1: Level tile  Device 1: Rolling walker  Gait Support Devices: Gait belt  Assistance 1: Minimum assistance  Quality of Gait 1:  (Pt demonstrating flexed trunk posture, reduced B step length and narrow VIVI)  Comments/Distance (ft) 1: 20 (Additionally completed x5ft forwards and x5ft retro ambulation (cues for step amplitude))  Transfers  Transfer: Yes  Transfer 1  Transfer From 1: Sit to  Transfer to 1: Stand  Technique 1: Stand to sit, Sit to stand  Transfer Device 1: Gait belt, Walker  Transfer Level of Assistance 1: Moderate  assistance  Trials/Comments 1: Verbal cues for sequencing; mod A to shift weight in anterior to superior direction    Outcome Measures:  [facility] Basic Mobility  Turning from your back to your side while in a flat bed without using bedrails: A lot  Moving from lying on your back to sitting on the side of a flat bed without using bedrails: A lot  Moving to and from bed to chair (including a wheelchair): A lot  Standing up from a chair using your arms (e.g. wheelchair or bedside chair): A lot  To walk in hospital room: A little  Climbing 3-5 steps with railing: Total  Basic Mobility - Total Score: 12    Education Documentation  Mobility Training, taught by Ravinder Naik, PT at 10/2/2023  1:16 PM.  Learner: Patient  Readiness: Acceptance  Method: Explanation  Response: Verbalizes Understanding    Education Comments  No comments found.        OP EDUCATION:       Encounter Problems       Encounter Problems (Active)       Balance       Balance- Goal transcribed from [facility] (Progressing)       Start:  10/01/23    Expected End:  10/13/23       Balance: Established Date 29-Sep-2023  Balance: Goal Details Patient to improve dynamic standing balance to good in order to complete ADLs without LOB.  Balance: Time Frame for Goal 2 wks              Mobility       STG - Patient will ambulate (Progressing)       Start:  09/30/23    Expected End:  10/14/23       >20ft using FWW with CGA            Mobility       Standing Tolerance- Goal transcribed from [facility] (Progressing)       Start:  10/01/23    Expected End:  10/13/23       Standing Tolerance: Established Date 29-Sep-2023  Standing Tolerance: Goal Details Patient to tolerate standing 5-6 minutes for safe completion of ADLS.  Standing Tolerance: Time Frame for Goal 2 wks              Strength       Pt will demonstrate ability to complete dynamic seated activities (LE therapeutic exercises) over the course of 10 min with SBA      Goal 1 (Progressing)       Start:  09/30/23     Expected End:  10/14/23               Transfers       STG - Patient will perform bed mobility (Progressing)       Start:  09/30/23    Expected End:  10/14/23       SBA         STG - Patient will transfer sit to and from stand (Progressing)       Start:  09/30/23    Expected End:  10/14/23       CGA with no verbal cues for sequencing            Transfers       Bed Mobility- Goal transcribed from [facility] (Progressing)       Start:  10/01/23    Expected End:  10/13/23       Bed Mobility: Date Established 29-Sep-2023  Bed Mobility: [place] Level Goal minimum assist (75% patients effort)  Bed Mobility: Time Frame for Goal 2 wks           Functional Transfer- Goal transcribed from [facility] (Progressing)       Start:  10/01/23    Expected End:  10/13/23       Functional Transfer: Established Date 29-Sep-2023  Functional Transfer: Goal Details Patient to perform all functional transfers at [facility] with wheeled walker.  Functional Transfer: Time Frame for Goal 2 wks

## 2023-10-03 LAB
ANION GAP SERPL CALC-SCNC: 10 MMOL/L (ref 10–20)
BUN SERPL-MCNC: 14 MG/DL (ref 6–23)
CALCIUM SERPL-MCNC: 8.7 MG/DL (ref 8.6–10.3)
CHLORIDE SERPL-SCNC: 100 MMOL/L (ref 98–107)
CO2 SERPL-SCNC: 30 MMOL/L (ref 21–32)
CREAT SERPL-MCNC: 0.9 MG/DL (ref 0.5–1.3)
ERYTHROCYTE [DISTWIDTH] IN BLOOD BY AUTOMATED COUNT: 12 % (ref 11.5–14.5)
GFR SERPL CREATININE-BSD FRML MDRD: 88 ML/MIN/1.73M*2
GLUCOSE SERPL-MCNC: 114 MG/DL (ref 74–99)
HCT VFR BLD AUTO: 34.5 % (ref 41–52)
HGB BLD-MCNC: 10.7 G/DL (ref 13.5–17.5)
MCH RBC QN AUTO: 27.2 PG (ref 26–34)
MCHC RBC AUTO-ENTMCNC: 31 G/DL (ref 32–36)
MCV RBC AUTO: 88 FL (ref 80–100)
NRBC BLD-RTO: 0 /100 WBCS (ref 0–0)
PLATELET # BLD AUTO: 288 X10*3/UL (ref 150–450)
PMV BLD AUTO: 10.5 FL (ref 7.5–11.5)
POTASSIUM SERPL-SCNC: 4.1 MMOL/L (ref 3.5–5.3)
RBC # BLD AUTO: 3.94 X10*6/UL (ref 4.5–5.9)
SODIUM SERPL-SCNC: 136 MMOL/L (ref 136–145)
WBC # BLD AUTO: 6.6 X10*3/UL (ref 4.4–11.3)

## 2023-10-03 PROCEDURE — 80048 BASIC METABOLIC PNL TOTAL CA: CPT | Performed by: INTERNAL MEDICINE

## 2023-10-03 PROCEDURE — 36415 COLL VENOUS BLD VENIPUNCTURE: CPT | Performed by: INTERNAL MEDICINE

## 2023-10-03 PROCEDURE — 2500000001 HC RX 250 WO HCPCS SELF ADMINISTERED DRUGS (ALT 637 FOR MEDICARE OP): Performed by: INTERNAL MEDICINE

## 2023-10-03 PROCEDURE — 85027 COMPLETE CBC AUTOMATED: CPT | Performed by: INTERNAL MEDICINE

## 2023-10-03 PROCEDURE — G0378 HOSPITAL OBSERVATION PER HR: HCPCS

## 2023-10-03 PROCEDURE — 2500000004 HC RX 250 GENERAL PHARMACY W/ HCPCS (ALT 636 FOR OP/ED): Mod: MUE | Performed by: INTERNAL MEDICINE

## 2023-10-03 PROCEDURE — 99239 HOSP IP/OBS DSCHRG MGMT >30: CPT | Performed by: INTERNAL MEDICINE

## 2023-10-03 RX ADMIN — CARVEDILOL 6.25 MG: 6.25 TABLET, FILM COATED ORAL at 10:04

## 2023-10-03 RX ADMIN — RIVAROXABAN 20 MG: 20 TABLET, FILM COATED ORAL at 18:46

## 2023-10-03 RX ADMIN — FERROUS SULFATE TAB 325 MG (65 MG ELEMENTAL FE) 65 MG OF IRON: 325 (65 FE) TAB at 10:03

## 2023-10-03 RX ADMIN — FUROSEMIDE 40 MG: 40 TABLET ORAL at 10:04

## 2023-10-03 RX ADMIN — DOCUSATE SODIUM 100 MG: 100 CAPSULE, LIQUID FILLED ORAL at 10:04

## 2023-10-03 RX ADMIN — LISINOPRIL 5 MG: 5 TABLET ORAL at 10:04

## 2023-10-03 RX ADMIN — Medication 1 TABLET: at 10:03

## 2023-10-03 RX ADMIN — CARBIDOPA AND LEVODOPA 1 TABLET: 25; 100 TABLET ORAL at 10:03

## 2023-10-03 RX ADMIN — CARBIDOPA AND LEVODOPA 1 TABLET: 25; 100 TABLET ORAL at 20:11

## 2023-10-03 RX ADMIN — DOCUSATE SODIUM 100 MG: 100 CAPSULE, LIQUID FILLED ORAL at 20:11

## 2023-10-03 RX ADMIN — CARVEDILOL 6.25 MG: 6.25 TABLET, FILM COATED ORAL at 20:11

## 2023-10-03 RX ADMIN — ATORVASTATIN CALCIUM 20 MG: 20 TABLET, FILM COATED ORAL at 20:11

## 2023-10-03 RX ADMIN — POTASSIUM CHLORIDE 10 MEQ: 750 TABLET, EXTENDED RELEASE ORAL at 10:03

## 2023-10-03 RX ADMIN — CARBIDOPA AND LEVODOPA 1 TABLET: 25; 100 TABLET ORAL at 15:31

## 2023-10-03 ASSESSMENT — PAIN - FUNCTIONAL ASSESSMENT: PAIN_FUNCTIONAL_ASSESSMENT: 0-10

## 2023-10-03 ASSESSMENT — PAIN SCALES - GENERAL: PAINLEVEL_OUTOF10: 0 - NO PAIN

## 2023-10-03 NOTE — PROGRESS NOTES
Pharmacy Medication History Review    Shashi Gutierrez is a 78 y.o. male admitted for Weakness. Pharmacy reviewed the patient's [id number] medications and allergies for accuracy.    The list below reflectives the updated PTA list. Please review each medication in order reconciliation for additional clarification and justification.  Medications Prior to Admission   Medication Sig Dispense Refill Last Dose    acetaminophen (Tylenol) 325 mg tablet Take 1 tablet (325 mg) by mouth every 4 hours if needed.       albuterol (ProAir HFA) 90 mcg/actuation inhaler Inhale 2 puffs every 6 hours if needed for shortness of breath or wheezing. 18 g 2     atorvastatin (Lipitor) 20 mg tablet Take 1 tablet (20 mg) by mouth once daily. 90 tablet 3     carbidopa-levodopa (Sinemet)  mg tablet Take 1 tablet by mouth 2 times a day. (Patient taking differently: Take 1 tablet by mouth 3 times a day.) 90 tablet 3     carvedilol (Coreg) 6.25 mg tablet Take 1 tablet (6.25 mg) by mouth 2 times a day with meals. 90 tablet 3     docusate sodium (Colace) 100 mg capsule Take 1 capsule (100 mg) by mouth once daily.       ferrous sulfate 325 (65 Fe) MG tablet Take 1 tablet (325 mg) by mouth once daily.       fexofenadine (Allegra) 180 mg tablet Take 1 tablet (180 mg) by mouth once daily as needed.       fluticasone (Flonase) 50 mcg/actuation nasal spray Administer 2 sprays into each nostril once daily as needed for rhinitis. Shake liquid before use       folic acid/multivit-min/lutein (CENTRUM SILVER ORAL) Take 1 tablet by mouth once daily.       furosemide (Lasix) 20 mg tablet Take 1 tablet (20 mg) by mouth once daily. (Patient taking differently: Take 2 tablets (40 mg) by mouth once daily.) 90 tablet 3     potassium chloride CR 10 mEq ER tablet Take 1 tablet (10 mEq) by mouth once daily. 90 tablet 3     rivaroxaban (Xarelto) 20 mg tablet Take 1 tablet (20 mg) by mouth once daily. 90 tablet 3     sennosides (Senokot) 8.6 mg tablet Take 1  tablet (8.6 mg) by mouth 2 times a day. 180 tablet 3     sildenafil (Viagra) 50 mg tablet Take 1 tablet (50 mg) by mouth if needed each day for erectile dysfunction. One hour before needed           The list below reflectives the updated allergy list. Please review each documented allergy for additional clarification and justification.  Allergies  Reviewed by Katy Shelodn RN on 10/1/2023        Severity Reactions Comments    Aspirin Not Specified Other Ok with 81m g dose Reaction: 325 mg dose  has severe rectal bleeding    Carvedilol Not Specified Unknown     Lisinopril Not Specified Unknown cough            Below are additional concerns with the patient's PTA list.  .PTA   Per patient son and pharmacy  Lisa Dick, SUZIhT

## 2023-10-04 LAB
ALBUMIN SERPL BCP-MCNC: 3.4 G/DL (ref 3.4–5)
ANION GAP IN SER/PLAS: NORMAL
ANION GAP SERPL CALC-SCNC: 10 MMOL/L (ref 10–20)
BUN SERPL-MCNC: 17 MG/DL (ref 6–23)
CALCIUM (MG/DL) IN SER/PLAS: NORMAL
CALCIUM SERPL-MCNC: 9.3 MG/DL (ref 8.6–10.3)
CARBON DIOXIDE, TOTAL (MMOL/L) IN SER/PLAS: NORMAL
CHLORIDE (MMOL/L) IN SER/PLAS: NORMAL
CHLORIDE SERPL-SCNC: 102 MMOL/L (ref 98–107)
CO2 SERPL-SCNC: 29 MMOL/L (ref 21–32)
CREAT SERPL-MCNC: 0.88 MG/DL (ref 0.5–1.3)
CREATININE (MG/DL) IN SER/PLAS: NORMAL
ERYTHROCYTE DISTRIBUTION WIDTH (RATIO) BY AUTOMATED COUNT: NORMAL
ERYTHROCYTE MEAN CORPUSCULAR HEMOGLOBIN CONCENTRATION (G/DL) BY AUTOMATED: NORMAL
ERYTHROCYTE MEAN CORPUSCULAR VOLUME (FL) BY AUTOMATED COUNT: NORMAL
ERYTHROCYTE [DISTWIDTH] IN BLOOD BY AUTOMATED COUNT: 11.9 % (ref 11.5–14.5)
ERYTHROCYTES (10*6/UL) IN BLOOD BY AUTOMATED COUNT: NORMAL
GFR FEMALE: NORMAL
GFR MALE: NORMAL
GFR SERPL CREATININE-BSD FRML MDRD: 89 ML/MIN/1.73M*2
GLOMERULAR FILTRATION RATE-AFRICAN AMERICAN: NORMAL ML/MIN/1.73M2
GLOMERULAR FILTRATION RATE-NON AFRICAN AMERICAN: NORMAL ML/MIN/1.73M2
GLUCOSE (MG/DL) IN SER/PLAS: NORMAL
GLUCOSE SERPL-MCNC: 98 MG/DL (ref 74–99)
HCT VFR BLD AUTO: 35.9 % (ref 41–52)
HEMATOCRIT (%) IN BLOOD BY AUTOMATED COUNT: NORMAL
HEMOGLOBIN (G/DL) IN BLOOD: NORMAL
HGB BLD-MCNC: 11.1 G/DL (ref 13.5–17.5)
LEUKOCYTES (10*3/UL) IN BLOOD BY AUTOMATED COUNT: NORMAL
MCH RBC QN AUTO: 27.2 PG (ref 26–34)
MCHC RBC AUTO-ENTMCNC: 30.9 G/DL (ref 32–36)
MCV RBC AUTO: 88 FL (ref 80–100)
NRBC (PER 100 WBCS) BY AUTOMATED COUNT: NORMAL
NRBC BLD-RTO: 0 /100 WBCS (ref 0–0)
PHOSPHATE SERPL-MCNC: 2.4 MG/DL (ref 2.5–4.9)
PLATELET # BLD AUTO: 316 X10*3/UL (ref 150–450)
PLATELETS (10*3/UL) IN BLOOD AUTOMATED COUNT: NORMAL
PMV BLD AUTO: 10.7 FL (ref 7.5–11.5)
POTASSIUM (MMOL/L) IN SER/PLAS: NORMAL
POTASSIUM SERPL-SCNC: 4 MMOL/L (ref 3.5–5.3)
RBC # BLD AUTO: 4.08 X10*6/UL (ref 4.5–5.9)
SODIUM (MMOL/L) IN SER/PLAS: NORMAL
SODIUM SERPL-SCNC: 137 MMOL/L (ref 136–145)
UREA NITROGEN (MG/DL) IN SER/PLAS: NORMAL
WBC # BLD AUTO: 7 X10*3/UL (ref 4.4–11.3)

## 2023-10-04 PROCEDURE — 97110 THERAPEUTIC EXERCISES: CPT | Mod: GP | Performed by: PHYSICAL THERAPIST

## 2023-10-04 PROCEDURE — 80069 RENAL FUNCTION PANEL: CPT | Performed by: INTERNAL MEDICINE

## 2023-10-04 PROCEDURE — 97112 NEUROMUSCULAR REEDUCATION: CPT | Mod: GO

## 2023-10-04 PROCEDURE — 2500000001 HC RX 250 WO HCPCS SELF ADMINISTERED DRUGS (ALT 637 FOR MEDICARE OP): Performed by: INTERNAL MEDICINE

## 2023-10-04 PROCEDURE — 2500000004 HC RX 250 GENERAL PHARMACY W/ HCPCS (ALT 636 FOR OP/ED): Mod: MUE | Performed by: INTERNAL MEDICINE

## 2023-10-04 PROCEDURE — 85027 COMPLETE CBC AUTOMATED: CPT | Performed by: INTERNAL MEDICINE

## 2023-10-04 PROCEDURE — 97116 GAIT TRAINING THERAPY: CPT | Mod: GP | Performed by: PHYSICAL THERAPIST

## 2023-10-04 PROCEDURE — G0378 HOSPITAL OBSERVATION PER HR: HCPCS

## 2023-10-04 PROCEDURE — 36415 COLL VENOUS BLD VENIPUNCTURE: CPT | Performed by: INTERNAL MEDICINE

## 2023-10-04 PROCEDURE — 99239 HOSP IP/OBS DSCHRG MGMT >30: CPT | Performed by: INTERNAL MEDICINE

## 2023-10-04 PROCEDURE — 97535 SELF CARE MNGMENT TRAINING: CPT | Mod: GO

## 2023-10-04 RX ADMIN — LISINOPRIL 5 MG: 5 TABLET ORAL at 10:19

## 2023-10-04 RX ADMIN — CARBIDOPA AND LEVODOPA 1 TABLET: 25; 100 TABLET ORAL at 10:19

## 2023-10-04 RX ADMIN — DOCUSATE SODIUM 100 MG: 100 CAPSULE, LIQUID FILLED ORAL at 10:19

## 2023-10-04 RX ADMIN — RIVAROXABAN 20 MG: 20 TABLET, FILM COATED ORAL at 17:29

## 2023-10-04 RX ADMIN — DOCUSATE SODIUM 100 MG: 100 CAPSULE, LIQUID FILLED ORAL at 21:17

## 2023-10-04 RX ADMIN — ATORVASTATIN CALCIUM 20 MG: 20 TABLET, FILM COATED ORAL at 21:17

## 2023-10-04 RX ADMIN — CARVEDILOL 6.25 MG: 6.25 TABLET, FILM COATED ORAL at 10:19

## 2023-10-04 RX ADMIN — CARVEDILOL 6.25 MG: 6.25 TABLET, FILM COATED ORAL at 21:17

## 2023-10-04 RX ADMIN — CARBIDOPA AND LEVODOPA 1 TABLET: 25; 100 TABLET ORAL at 21:17

## 2023-10-04 RX ADMIN — FUROSEMIDE 40 MG: 40 TABLET ORAL at 10:23

## 2023-10-04 RX ADMIN — FERROUS SULFATE TAB 325 MG (65 MG ELEMENTAL FE) 65 MG OF IRON: 325 (65 FE) TAB at 10:19

## 2023-10-04 RX ADMIN — POTASSIUM CHLORIDE 10 MEQ: 750 TABLET, EXTENDED RELEASE ORAL at 10:19

## 2023-10-04 RX ADMIN — Medication 1 TABLET: at 10:19

## 2023-10-04 RX ADMIN — CARBIDOPA AND LEVODOPA 1 TABLET: 25; 100 TABLET ORAL at 14:55

## 2023-10-04 ASSESSMENT — PAIN - FUNCTIONAL ASSESSMENT
PAIN_FUNCTIONAL_ASSESSMENT: 0-10
PAIN_FUNCTIONAL_ASSESSMENT: 0-10

## 2023-10-04 ASSESSMENT — COGNITIVE AND FUNCTIONAL STATUS - GENERAL
MOBILITY SCORE: 17
HELP NEEDED FOR BATHING: A LOT
PERSONAL GROOMING: A LITTLE
STANDING UP FROM CHAIR USING ARMS: A LOT
WALKING IN HOSPITAL ROOM: A LOT
MOVING FROM LYING ON BACK TO SITTING ON SIDE OF FLAT BED WITH BEDRAILS: A LITTLE
MOBILITY SCORE: 15
CLIMB 3 TO 5 STEPS WITH RAILING: A LOT
DRESSING REGULAR UPPER BODY CLOTHING: A LITTLE
CLIMB 3 TO 5 STEPS WITH RAILING: A LOT
MOVING TO AND FROM BED TO CHAIR: A LOT
DRESSING REGULAR LOWER BODY CLOTHING: A LOT
DAILY ACTIVITIY SCORE: 17
TOILETING: A LITTLE
TURNING FROM BACK TO SIDE WHILE IN FLAT BAD: A LITTLE
WALKING IN HOSPITAL ROOM: A LITTLE
STANDING UP FROM CHAIR USING ARMS: A LITTLE
TURNING FROM BACK TO SIDE WHILE IN FLAT BAD: A LITTLE
MOVING TO AND FROM BED TO CHAIR: A LITTLE

## 2023-10-04 ASSESSMENT — PAIN SCALES - GENERAL
PAINLEVEL_OUTOF10: 0 - NO PAIN
PAINLEVEL_OUTOF10: 0 - NO PAIN

## 2023-10-04 ASSESSMENT — ACTIVITIES OF DAILY LIVING (ADL)
BATHING_WHERE_ASSESSED: EDGE OF BED
HOME_MANAGEMENT_TIME_ENTRY: 18
BATHING_LEVEL_OF_ASSISTANCE: MINIMUM ASSISTANCE

## 2023-10-04 NOTE — PROGRESS NOTES
10/4/2023 4:34 PM I spoke to patient's son Glenn Gutierrez  [phone]. I discussed that patient was not accepted at [facility]. Son requested a new SNF list. I printed a list and left in patient room for son. Nika DAVILA

## 2023-10-04 NOTE — PROGRESS NOTES
Physical Therapy    Physical Therapy Treatment    Patient Name: Shashi Gutierrez  MRN: [id number]  Today's Date: 10/4/2023  Time Calculation  Start Time: 1521  Stop Time: 1546  Time Calculation (min): 25 min       Assessment/Plan   PT Assessment  PT Assessment Results: Decreased strength, Decreased endurance, Impaired balance, Decreased mobility, Decreased cognition, Impaired judgement, Decreased safety awareness  Rehab Prognosis: Good  Barriers to Discharge: Requiring substantial time for all mobility  Evaluation/Treatment Tolerance: Patient tolerated treatment well  Medical Staff Made Aware: Yes  Strengths: Other (Comment) (pt motivated)  Barriers to Participation: Other (Comment) (cognitive deficits)  End of Session Communication: Bedside nurse  Assessment Comment: Pt with notable progress with gait and transfers this visit.  Still requires increased time to complete tasks as evidenced by requiring >90 seconds to complete 5x STS.Pt remains at a high fall risk as well as at increased risk for long term functional decline without continued skilled PT in the acute setting and after discharge. Pt is at mod risk for fall per Tinetti gait and balance test.  End of Session Patient Position: Up in chair, Alarm on (Needs in reach)  PT Plan  Inpatient/Swing Bed or Outpatient: Inpatient  PT Plan  Treatment/Interventions: Bed mobility, Transfer training, Gait training, Balance training, Neuromuscular re-education, Stair training, Strengthening, Endurance training, Therapeutic exercise, Therapeutic activity, Home exercise program  PT Plan: Skilled PT  PT Frequency: 3 times per week  PT Discharge Recommendations: Moderate intensity level of continued care  PT Recommended Transfer Status: Assist x1, Contact guard (with RW)      General Visit Information:   PT  Visit  PT Received On: 10/04/23  Response to Previous Treatment: Patient with no complaints from previous session.  General  Prior to Session Communication: Bedside  nurse  Patient Position Received: Up in chair, Alarm on (nees in reach)  General Comment: No acute events in the past 24 hours pertinent to PT.    Subjective   Precautions:  Precautions  Hearing/Visual Limitations: [place]  Medical Precautions: Fall precautions  Vital Signs:       Objective   Pain:  Pain Assessment  Pain Assessment: 0-10  Pain Score: 0 - No pain  Cognition:  Cognition  Overall Cognitive Status: Within Functional Limits  Orientation Level: Oriented X4 (Pt required vc's for month (stated it was September.))  Attention: Within Functional Limits  Processing Speed: Delayed     Treatments:  Therapeutic Exercise  Therapeutic Exercise Performed: Yes  Therapeutic Exercise Activity 1: Pt instructed in seated B LE therapeutic exercises to improve LE strength for transfers and ambulation; performed 1x15 reps of seated B LE hip flex, AP's, isometric hip add with pillow, hip abd/add, heelslides and LAQ's; required min vc's for proper form and to move through full ROM to gain max benefit of the exercises.    Therapeutic Activity  Therapeutic Activity Performed: Yes  Therapeutic Activity 1: Pt provided instruction in safe sit<->stand technique to enable him to move in/out of bed/chair safely; pt required minimal tactile and verbal cues for hand placements and to scoot to edge of sitting surface to facilitate ease of sit->stand, as well as to line up to sitting surface before sitting. Pt required over 90 seconds to perform 5 trials of sit<->stand.              Ambulation/Gait Training  Ambulation/Gait Training Performed: Yes (Pt provided gait training with RW to enable him to safely ambulate household distances; required minimal verbal cues for walker management, increased tep height, erect posture, and safety.)  Ambulation/Gait Training 1  Surface 1: Level tile  Device 1: Rolling walker  Gait Support Devices: Gait belt  Assistance 1: Contact guard  Quality of Gait 1: Narrow base of support (decreased step height,  flexed posture)  Comments/Distance (ft) 1: 30'x2  Transfers  Transfer: Yes  Transfer 1  Transfer From 1: Sit to  Transfer to 1: Stand  Technique 1: Sit to stand  Transfer Device 1: Walker  Transfer Level of Assistance 1: Contact guard, Minimal tactile cues, Minimal verbal cues  Trials/Comments 1: 6 trials         Outcome Measures:  [facility] Basic Mobility  Turning from your back to your side while in a flat bed without using bedrails: A little  Moving from lying on your back to sitting on the side of a flat bed without using bedrails: A little  Moving to and from bed to chair (including a wheelchair): A little  Standing up from a chair using your arms (e.g. wheelchair or bedside chair): A little  To walk in hospital room: A little  Climbing 3-5 steps with railing: A lot  Basic Mobility - Total Score: 17    Tinetti  Sitting Balance: Steady, safe  Arises: Unable without help  Attempts to Arise: Able to arise, one attempt  Immediate Standing Balance (First 5 Seconds): Steady but uses walker or other support  Standing Balance: Steady but wide stance, uses cane or other support  Nudged: Staggers, grabs, catches self  Eyes Closed: Steady  Turned 360 Degrees: Steadiness: Unsteady (Grabs, staggers)  Turned 360 Degrees: Continuity of Steps: Discontinuous steps  Sitting Down: Uses arms or not a smooth motion  Balance Score: 8  Initiation of Gait: No hesitancy  Step Height: R Swing Foot: Right foot does not clear floor completely with step  Step Length: R Swing Foot: Passes left stance foot  Step Height: L Swing Foot: Left foot does not clear floor completely with step  Step Length: L Swing Foot: Passes right stance foot  Step Symmetry: Right and left step appear equal  Step Continuity: Steps appear continuous  Path: Mild/moderate deviation or uses walking aid  Trunk: Marked sway or uses walking aid  Walking Time: Heels almost touching while walking  Gait Score: 7  Total Score: 15    Education Documentation  Mobility Training,  taught by Misty Kaye, PT at 10/4/2023  4:50 PM.  Learner: Patient  Readiness: Acceptance  Method: Explanation  Response: Verbalizes Understanding, Demonstrated Understanding, Needs Reinforcement    Education Comments  No comments found.        OP EDUCATION:       Encounter Problems       Encounter Problems (Active)       Balance       Balance- Goal transcribed from [facility] (Progressing)       Start:  10/01/23    Expected End:  10/13/23       Balance: Established Date 29-Sep-2023  Balance: Goal Details Patient to improve dynamic standing balance to good in order to complete ADLs without LOB.  Balance: Time Frame for Goal 2 wks              Balance       STG- Pt will score >/= 18/28 on Tinetti gait and balance test to reduce his fall risk.  (Progressing)       Start:  10/04/23    Expected End:  10/18/23               Mobility       STG - Patient will ambulate (Met)       Start:  09/30/23    Expected End:  10/14/23    Resolved:  10/04/23    Updated to: STG - Patient will ambulate >150' with RW and modified independence.    Update reason: Goal met    >20ft using FWW with CGA         STG - Patient will ambulate >150' with RW and modified independence. (Progressing)       Start:  10/04/23    Expected End:  10/14/23       >20ft using FWW with CGA            Mobility       Standing Tolerance- Goal transcribed from [facility] (Progressing)       Start:  10/01/23    Expected End:  10/13/23       Standing Tolerance: Established Date 29-Sep-2023  Standing Tolerance: Goal Details Patient to tolerate standing 5-6 minutes for safe completion of ADLS.  Standing Tolerance: Time Frame for Goal 2 wks              Strength       Pt will demonstrate ability to complete dynamic seated activities (LE therapeutic exercises) over the course of 10 min with SBA      Goal 1 (Met)       Start:  09/30/23    Expected End:  10/14/23    Resolved:  10/04/23    Updated to: Goal 1- Improve B LE strength to >/= to 4/5 grossly throughout to enable  achievement of gait and transfer goals.    Update reason: Pt achieved goal         Goal 1- Improve B LE strength to >/= to 4/5 grossly throughout to enable achievement of gait and transfer goals. (Progressing)       Start:  10/04/23    Expected End:  10/14/23                   Transfers       STG - Patient will perform bed mobility (Met)       Start:  09/30/23    Expected End:  10/14/23    Resolved:  10/04/23    Updated to: STG - Patient will perform bed mobility independently.    Update reason: Met goal     SBA         STG - Patient will transfer sit to and from stand (Met)       Start:  09/30/23    Expected End:  10/14/23    Resolved:  10/04/23    Updated to: STG - Patient will transfer sit to and from stand independently with RW.    Update reason: Goal met    CGA with no verbal cues for sequencing         STG - Patient will perform bed mobility independently. (Progressing)       Start:  10/04/23    Expected End:  10/14/23       SBA         STG - Patient will transfer sit to and from stand independently with RW. (Progressing)       Start:  10/04/23    Expected End:  10/14/23       CGA with no verbal cues for sequencing            Transfers       Bed Mobility- Goal transcribed from [facility] (Progressing)       Start:  10/01/23    Expected End:  10/13/23       Bed Mobility: Date Established 29-Sep-2023  Bed Mobility: [place] Level Goal minimum assist (75% patients effort)  Bed Mobility: Time Frame for Goal 2 wks           Functional Transfer- Goal transcribed from [facility] (Progressing)       Start:  10/01/23    Expected End:  10/13/23       Functional Transfer: Established Date 29-Sep-2023  Functional Transfer: Goal Details Patient to perform all functional transfers at CGA with wheeled walker.  Functional Transfer: Time Frame for Goal 2 wks

## 2023-10-04 NOTE — PROGRESS NOTES
Paras Mclaughlin is a 78 y.o. male on day 0 of admission presenting with Weakness.      Subjective   Pt was seen and examined at beside, states that he feeling better       Objective     Last Recorded Vitals  /73 (Patient Position: Lying)   Pulse 66   Temp 36.9 °C (98.5 °F) (Temporal)   Resp 18   Wt 95 kg (209 lb 7 oz)   SpO2 97%   Intake/Output last 3 Shifts:    Intake/Output Summary (Last 24 hours) at 10/3/2023 2043  Last data filed at 10/3/2023 1658  Gross per 24 hour   Intake 590 ml   Output 1200 ml   Net -610 ml       Admission Weight  Weight: 95 kg (209 lb 7 oz) (09/29/23 1241)    Daily Weight  09/29/23 : 95 kg (209 lb 7 oz)    Image Results  XR chest 1 view  Narrative: Interpreted By:  ESPINOZA BARBER MD  MRN: [id number]  Patient Name: PARAS MCLAUGHLIN     STUDY:  CHEST 1 VIEW     INDICATION:  weakness. Hx CHF .     COMPARISON:  August 29     ACCESSION NUMBER(S):  [id number]     ORDERING CLINICIAN:  TL MCLAUGHLIN     FINDINGS:  Cardiomegaly with tortuous aorta, slightly more prominent due to the  low lung volumes.     No consolidation, effusion, edema, or pneumothorax.     Impression: Low lung volumes but no evidence of acute intrathoracic abnormality.      Physical Exam  Constitutional: Alert active, cooperative not in acute distress  Eyes: PERRLA, clear sclera  ENMT: Moist mucosal membranes, no exudate  Head / Neck: Atraumatic, normocephalic, supple neck, JVP not visualized  Lungs: Patent airways, CTABL  Heart: RRR, S1S2, no murmurs appreciated, palpable pulses in all extremities  GI: Soft, NT, ND, bowel sounds present in all quadrants  MSK: Moves all extremities freely, makes effort with limbs movement, no joint edema  Extremities: Intact x 4, no peripheral edema  : No Tse catheter inserted  Breast: Deferred  Neurological: AAO x 3 to person, place and date, facial muscles symmetrical, sensation intact, strength appropriate, no acute focal neurological deficits appreciated  Psychological: Appropriate mood  and behavior   Relevant Results               Assessment/Plan      78 year old Male with past medical history of paroxysmal atrial fibrillation (on Xarelto), CHF, hyperlipidemia, prostate cancer, essential tremor, was brought to  [facility] ED for worsening weakness and found to have a UTI     UTI: Acute  -Ceftriaxone 2 g IV piggyback daily, completed, will discontinue  -Urine culture not submitted in the ED     Weakness: Gradual onset, worsening in the setting of UTI  -PT OT: Appreciate recommendation  -Spoke with his son, agreed to placement if necessary     Chronic diastolic heart failure: Stable, likely at baseline with peripheral edema  -Monitor for signs of decompensation  -Continue home regimen     Atrial fibrillation  -On Xarelto 20 mg daily  -Carvedilol 6.25 mg twice daily     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

## 2023-10-04 NOTE — PROGRESS NOTES
Occupational Therapy    Occupational Therapy Treatment    Name: Shashi Gutierrez  MRN: [id number]  : 1945  Date: 10/04/23  Time Calculation  Start Time: 1254  Stop Time: 1327  Time Calculation (min): 33 min    Assessment:  OT Assessment:  (Patient making good progress towards OT goals, increased time required to complete all tasks. Patient is motivated to regain prior level of independence.)  Prognosis: Good  Barriers to Discharge: Decreased caregiver support  Evaluation/Treatment Tolerance: Patient limited by fatigue  Plan:  Treatment Interventions: ADL retraining, Functional transfer training, Endurance training, Patient/family training, Neuromuscular reeducation  OT Frequency: 3 times per week  OT Discharge Recommendations: Moderate intensity level of continued care  Equipment Recommended upon Discharge:  (Hip kit for LB dressing.)    Subjective   General:  OT Last Visit  OT Received On: 10/04/23  General  Reason for Referral: Gen weakness  Referred By: Dr. Franklin  Past Medical History Relevant to Rehab: paroxysmal atrial fibrillation (on Xarelto), CHF, hyperlipidemia, prostate cancer, essential tremor  Prior to Session Communication: Bedside nurse  Patient Position Received: Bed, 2 rail up, Alarm on  General Comment: Patient requires increased time to complete all tasks, however able to now stand and bathe self with assist.  Vitals:     Pain Assessment:  Pain Assessment  Pain Assessment: 0-10  Pain Score: 0 - No pain  Pain Type: Chronic pain  Pain Location: Knee     Objective   Activities of Daily Living: Grooming  Grooming Level of Assistance: Setup  Grooming Where Assessed: Edge of bed  Grooming Comments:  (Washing face.)    UE Bathing  UE Bathing Level of Assistance: Minimum assistance  UE Bathing Where Assessed: Standing sinkside, Edge of bed  UE Bathing Comments:  (Patient slightly unsteady standing.)    LE Bathing  LE Bathing Level of Assistance: Minimum assistance  LE Bathing Where Assessed: Edge of  bed  LE Bathing Comments: Increased time required to complete task.    UE Dressing  UE Dressing Level of Assistance: Minimum assistance  UE Dressing Where Assessed: Edge of bed  UE Dressing Comments:  (Increased time required.)    LE Dressing  LE Dressing: Yes  Pants Level of Assistance: Moderate assistance  Sock Level of Assistance: Moderate assistance  LE Dressing Where Assessed: Edge of bed  LE Dressing Comments:  (Increased time required.)    Functional Standing Tolerance:     Bed Mobility/Transfers: Bed Mobility  Bed Mobility: Yes  Bed Mobility 1  Bed Mobility 1: Supine to sitting  Level of Assistance 1: Minimum assistance  Bed Mobility Comments 1:  (Increased time required to complete task.)  Bed Mobility 2  Bed Mobility  2: Sitting to supine  Level of Assistance 2: Moderate assistance    Transfers  Transfer: Yes  Transfer 1  Transfer From 1: Bed to  Transfer to 1: Commode-standard  Technique 1: Sit to stand  Transfer Device 1: Walker  Transfer Level of Assistance 1: Minimum assistance  Trials/Comments 1:  (Increased time required.)  Transfers 2  Transfer From 2: Commode-standard to  Transfer to 2: Chair with arms  Technique 2: Sit to stand  Transfer Device 2: Walker  Transfer Level of Assistance 2: Minimum assistance  Trials/Comments 2: Increased time required to complete tasks.     Therapy/Activity: Balance/Neuromuscular Re-Education  Balance/Neuromuscular Re-Education Activity Performed:  (Static/dynamic standing activity with bathing, reaching for wet wipes on table with min A required to maintain balance.)   Outcome Measures:  [facility] Daily [facility]  Putting on and taking off regular lower body clothing: A lot  Bathing (including washing, rinsing, drying): A lot  Putting on and taking off regular upper body clothing: A little  Toileting, which includes using toilet, bedpan or urinal: A little  Taking care of personal grooming such as brushing teeth: A little  Eating Meals: None  Daily Activity - Total  Score: 17        Education Documentation  ADL Training, taught by Adair Knox OT at 10/4/2023  1:42 PM.  Learner: Patient  Readiness: Acceptance  Method: Demonstration  Response: Verbalizes Understanding    Education Comments  No comments found.      Goals:  Encounter Problems       Encounter Problems (Active)       Balance       Balance- Goal transcribed from [facility] (Progressing)       Start:  10/01/23    Expected End:  10/13/23       Balance: Established Date 29-Sep-2023  Balance: Goal Details Patient to improve dynamic standing balance to good in order to complete ADLs without LOB.  Balance: Time Frame for Goal 2 wks              Bathing       Bathing Upper Body- Goal transcribed from [facility] (Progressing)       Start:  10/01/23    Expected End:  10/13/23       Bathing Upper Body: Established Date 29-Sep-2023  Bathing Upper Body: [place] Level Goal contact guard  Bathing Upper Body: Time Frame for Goal 2 wks           Bathing Lower Body- Goal transcribed from [facility] (Progressing)       Start:  10/01/23    Expected End:  10/13/23       Bathing Lower Body: Established Date 29-Sep-2023  Bathing Lower Body: [place] Level Goal contact guard  Bathing Lower Body: Time Frame for Goal 2 wks              Dressing Upper Extremities       Upper Body Dressing- Goal transcribed from [facility] (Progressing)       Start:  10/01/23    Expected End:  10/13/23       Upper Body Dressing: Established Date 29-Sep-2023  Upper Body Dressing: [place] Level Goal contact guard  Upper Body Dressing: Time Frame for Goal 2 wks            Dressings Lower Extremities       Lower Body Dressing- Goal transcribed from [facility] (Progressing)       Start:  10/01/23    Expected End:  10/13/23       Lower Body Dressing: Established Date 29-Sep-2023  Lower Body Dressing: [place] Level Goal minimum assist (75% patients effort)  Lower Body Dressing: Assistive Device Goal dressing stick; reacher; sock-aid  Lower Body Dressing: Time  Frame for Goal 2 wks              Grooming       Grooming- Goal transcribed from [facility] (Progressing)       Start:  10/01/23    Expected End:  10/13/23       Grooming: Established Date 29-Sep-2023  Grooming: [place] Level Goal contact guard  Grooming: Time Frame for Goal 2 wks              Instrumental Activities of Daily Living       Energy Conservation- Goal transcribed from [facility] (Progressing)       Start:  10/01/23    Expected End:  10/13/23       Energy Conservation: Established Date 29-Sep-2023  Energy Conservation: Goal Details Patient to tolerate moderate activity with min rest periods and demo/verbalize energy conservation techniques.  Energy Conservation: Time Frame for Goal 2 wks                    Mobility       Standing Tolerance- Goal transcribed from [facility] (Progressing)       Start:  10/01/23    Expected End:  10/13/23       Standing Tolerance: Established Date 29-Sep-2023  Standing Tolerance: Goal Details Patient to tolerate standing 5-6 minutes for safe completion of ADLS.  Standing Tolerance: Time Frame for Goal 2 wks                Transfers       Bed Mobility- Goal transcribed from [facility] (Progressing)       Start:  10/01/23    Expected End:  10/13/23       Bed Mobility: Date Established 29-Sep-2023  Bed Mobility: [place] Level Goal minimum assist (75% patients effort)  Bed Mobility: Time Frame for Goal 2 wks           Functional Transfer- Goal transcribed from [facility] (Progressing)       Start:  10/01/23    Expected End:  10/13/23       Functional Transfer: Established Date 29-Sep-2023  Functional Transfer: Goal Details Patient to perform all functional transfers at [facility] with wheeled walker.  Functional Transfer: Time Frame for Goal 2 wks

## 2023-10-04 NOTE — CARE PLAN
Problem: Strength  Description: Pt will demonstrate ability to complete dynamic seated activities (LE therapeutic exercises) over the course of 10 min with SBA  Goal: Goal 1- Improve B LE strength to >/= to 4/5 grossly throughout to enable achievement of gait and transfer goals.  10/4/2023 1648 by Misty Kaye, PT  Outcome: Progressing     Problem: Mobility  Goal: STG - Patient will ambulate >150' with RW and modified independence.  Description: >20ft using FWW with CGA  10/4/2023 1648 by Misty Kaye, PT  Outcome: Progressing     Problem: Transfers  Goal: STG - Patient will perform bed mobility independently.  Description: SBA  10/4/2023 1648 by Misty Kaye, PT  Outcome: Progressing    Goal: STG - Patient will transfer sit to and from stand independently with RW.  Description: CGA with no verbal cues for sequencing  10/4/2023 1648 by Misty Kaye, PT  Outcome: Progressing    Problem: Balance  Goal: STG- Pt will score >/= 18/28 on Tinetti gait and balance test to reduce his fall risk.   Outcome: Progressing

## 2023-10-05 PROCEDURE — G0378 HOSPITAL OBSERVATION PER HR: HCPCS

## 2023-10-05 PROCEDURE — 99231 SBSQ HOSP IP/OBS SF/LOW 25: CPT | Performed by: NURSE PRACTITIONER

## 2023-10-05 PROCEDURE — 2500000004 HC RX 250 GENERAL PHARMACY W/ HCPCS (ALT 636 FOR OP/ED): Mod: MUE | Performed by: INTERNAL MEDICINE

## 2023-10-05 PROCEDURE — 2500000001 HC RX 250 WO HCPCS SELF ADMINISTERED DRUGS (ALT 637 FOR MEDICARE OP): Performed by: INTERNAL MEDICINE

## 2023-10-05 RX ADMIN — POTASSIUM CHLORIDE 10 MEQ: 750 TABLET, EXTENDED RELEASE ORAL at 09:22

## 2023-10-05 RX ADMIN — LISINOPRIL 5 MG: 5 TABLET ORAL at 09:22

## 2023-10-05 RX ADMIN — RIVAROXABAN 20 MG: 20 TABLET, FILM COATED ORAL at 16:42

## 2023-10-05 RX ADMIN — FERROUS SULFATE TAB 325 MG (65 MG ELEMENTAL FE) 65 MG OF IRON: 325 (65 FE) TAB at 09:22

## 2023-10-05 RX ADMIN — OXYCODONE HYDROCHLORIDE 5 MG: 5 TABLET ORAL at 21:44

## 2023-10-05 RX ADMIN — Medication 1 TABLET: at 09:22

## 2023-10-05 RX ADMIN — DOCUSATE SODIUM 100 MG: 100 CAPSULE, LIQUID FILLED ORAL at 21:44

## 2023-10-05 RX ADMIN — CARBIDOPA AND LEVODOPA 1 TABLET: 25; 100 TABLET ORAL at 09:23

## 2023-10-05 RX ADMIN — CARBIDOPA AND LEVODOPA 1 TABLET: 25; 100 TABLET ORAL at 16:41

## 2023-10-05 RX ADMIN — ATORVASTATIN CALCIUM 20 MG: 20 TABLET, FILM COATED ORAL at 21:44

## 2023-10-05 RX ADMIN — DOCUSATE SODIUM 100 MG: 100 CAPSULE, LIQUID FILLED ORAL at 09:22

## 2023-10-05 RX ADMIN — FUROSEMIDE 40 MG: 40 TABLET ORAL at 09:22

## 2023-10-05 RX ADMIN — RIVAROXABAN 20 MG: 20 TABLET, FILM COATED ORAL at 17:00

## 2023-10-05 RX ADMIN — CARVEDILOL 6.25 MG: 6.25 TABLET, FILM COATED ORAL at 21:44

## 2023-10-05 RX ADMIN — CARVEDILOL 6.25 MG: 6.25 TABLET, FILM COATED ORAL at 09:22

## 2023-10-05 RX ADMIN — CARBIDOPA AND LEVODOPA 1 TABLET: 25; 100 TABLET ORAL at 21:44

## 2023-10-05 ASSESSMENT — COGNITIVE AND FUNCTIONAL STATUS - GENERAL
MOVING FROM LYING ON BACK TO SITTING ON SIDE OF FLAT BED WITH BEDRAILS: A LITTLE
HELP NEEDED FOR BATHING: A LOT
TURNING FROM BACK TO SIDE WHILE IN FLAT BAD: A LITTLE
MOBILITY SCORE: 17
STANDING UP FROM CHAIR USING ARMS: A LITTLE
DAILY ACTIVITIY SCORE: 17
WALKING IN HOSPITAL ROOM: A LITTLE
DRESSING REGULAR LOWER BODY CLOTHING: A LOT
TOILETING: A LITTLE
CLIMB 3 TO 5 STEPS WITH RAILING: A LOT
DRESSING REGULAR UPPER BODY CLOTHING: A LITTLE
MOVING TO AND FROM BED TO CHAIR: A LITTLE
PERSONAL GROOMING: A LITTLE

## 2023-10-05 ASSESSMENT — PAIN SCALES - GENERAL: PAINLEVEL_OUTOF10: 5 - MODERATE PAIN

## 2023-10-05 ASSESSMENT — PAIN - FUNCTIONAL ASSESSMENT: PAIN_FUNCTIONAL_ASSESSMENT: 0-10

## 2023-10-05 ASSESSMENT — PAIN DESCRIPTION - DESCRIPTORS: DESCRIPTORS: SHARP;STABBING

## 2023-10-05 NOTE — PROGRESS NOTES
"Shashi Gutierrez is a 78 y.o. male on day 0 of admission presenting with Weakness.    Subjective   Pt was seen and examined at bedside, states that he feels better, and denies having any other symptoms at this time.       Objective     Physical Exam  Constitutional: Alert active, cooperative not in acute distress  Eyes: PERRLA, clear sclera  ENMT: Moist mucosal membranes, no exudate  Head / Neck: Atraumatic, normocephalic, supple neck, JVP not visualized  Lungs: Patent airways, CTABL  Heart: RRR, S1S2, no murmurs appreciated, palpable pulses in all extremities  GI: Soft, NT, ND, bowel sounds present in all quadrants  MSK: Moves all extremities with effort, some LE restriction  of ROM, no joint edema  Extremities: Intact x 4, no peripheral edema  : No Tse catheter inserted  Breast: Deferred  Neurological: AAO x 3 to person, place and date, facial muscles symmetrical, sensation intact, strength 4/4, no acute focal neurological deficits appreciated  Psychological: Appropriate mood and behavior     Last Recorded Vitals  Blood pressure 122/70, pulse 73, temperature 37.1 °C (98.7 °F), temperature source Temporal, resp. rate 17, height 1.828 m (5' 11.97\"), weight 95 kg (209 lb 7 oz), SpO2 98 %.  Intake/Output last 3 Shifts:  I/O last 3 completed shifts:  In: 540 (5.7 mL/kg) [P.O.:540]  Out: 1200 (12.6 mL/kg) [Urine:1200 (0.4 mL/kg/hr)]  Weight: 95 kg     Relevant Results            Scheduled medications  atorvastatin, 20 mg, oral, Nightly  carbidopa-levodopa, 1 tablet, oral, TID  carvedilol, 6.25 mg, oral, BID  docusate sodium, 100 mg, oral, BID  ferrous sulfate, 65 mg of iron, oral, Daily with breakfast  furosemide, 40 mg, oral, Daily  lisinopril, 5 mg, oral, Daily  multivitamin with minerals, 1 tablet, oral, Daily  potassium chloride CR, 10 mEq, oral, Daily  rivaroxaban, 20 mg, oral, Daily with evening meal      Continuous medications     PRN medications  PRN medications: acetaminophen, albuterol, melatonin, " ondansetron, oxyCODONE, polyethylene glycol, sennosides                  Assessment/Plan   Principal Problem:    Weakness    78 year old Male with past medical history of paroxysmal atrial fibrillation (on Xarelto), CHF, hyperlipidemia, prostate cancer, essential tremor, was brought to  [facility] ED for worsening weakness and found to have a UTI     UTI: Acute  -Ceftriaxone 2 g IV piggyback daily, completed, will discontinue  -Urine culture not submitted in the ED     Weakness: Gradual onset, worsening in the setting of UTI  -PT OT: Appreciate recommendation  -Spoke with his son, agreed to placement if necessary     Chronic diastolic heart failure: Stable, likely at baseline with peripheral edema  -Monitor for signs of decompensation  -Continue home regimen     Atrial fibrillation  -On Xarelto 20 mg daily  -Carvedilol 6.25 mg twice daily     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      Caleb Franklin, DO

## 2023-10-05 NOTE — CARE PLAN
The patient's goals for the shift include      The clinical goals for the shift include        Problem: Bathing  Goal: Bathing Upper Body- Goal transcribed from Care  Outcome: Progressing  Goal: Bathing Lower Body- Goal transcribed from Care  Outcome: Progressing

## 2023-10-05 NOTE — PROGRESS NOTES
10/05/23 1319   Discharge Planning   Type of Residence Skilled nursing facility     Called patient son Glenn OBRIEN left to retrieved more snf choices, asked to return call asap   Met patient at bedside got patient other son number Adam [phone] re; discharge planning concerns, VM left patient is medically ready need to decide home vs placment

## 2023-10-05 NOTE — PROGRESS NOTES
"Shashi Gutierrez is a 78 y.o. male on day 0 of admission presenting with Weakness.    Subjective   Pt was seen and examined at bedside, states that he feels better, and denies having any other symptoms at this time.       Objective     Physical Exam  Constitutional: Alert active, cooperative not in acute distress  Eyes: PERRLA, clear sclera  ENMT: Moist mucosal membranes, no exudate  Head / Neck: Atraumatic, normocephalic, supple neck, JVP not visualized  Lungs: Patent airways, CTABL  Heart: RRR, S1S2, no murmurs appreciated, palpable pulses in all extremities  GI: Soft, NT, ND, bowel sounds present in all quadrants  MSK: Moves all extremities with effort, some LE restriction  of ROM, no joint edema  Extremities: Intact x 4, no peripheral edema  : No Tse catheter inserted  Breast: Deferred  Neurological: AAO x 3 to person, place and date, facial muscles symmetrical, sensation intact, strength 4/4, no acute focal neurological deficits appreciated  Psychological: Appropriate mood and behavior     Last Recorded Vitals  Blood pressure 122/65, pulse 64, temperature 36.8 °C (98.2 °F), resp. rate 17, height 1.828 m (5' 11.97\"), weight 95 kg (209 lb 7 oz), SpO2 94 %.  Intake/Output last 3 Shifts:  No intake/output data recorded.    Relevant Results            Scheduled medications  atorvastatin, 20 mg, oral, Nightly  carbidopa-levodopa, 1 tablet, oral, TID  carvedilol, 6.25 mg, oral, BID  docusate sodium, 100 mg, oral, BID  ferrous sulfate, 65 mg of iron, oral, Daily with breakfast  furosemide, 40 mg, oral, Daily  lisinopril, 5 mg, oral, Daily  multivitamin with minerals, 1 tablet, oral, Daily  potassium chloride CR, 10 mEq, oral, Daily  rivaroxaban, 20 mg, oral, Daily with evening meal      Continuous medications     PRN medications  PRN medications: acetaminophen, albuterol, melatonin, ondansetron, oxyCODONE, polyethylene glycol, sennosides                  Assessment/Plan   Principal Problem:    Weakness    78 year old " Male with past medical history of paroxysmal atrial fibrillation (on Xarelto), CHF, hyperlipidemia, prostate cancer, essential tremor, was brought to  [facility] ED for worsening weakness and found to have a UTI     UTI: Acute  -Ceftriaxone 2 g IV piggyback daily, completed course   -Urine culture not submitted in the ED     Weakness: Gradual onset, worsening in the setting of UTI  -PT OT: Appreciate recommendation  -awaiting placement, [facility] has no beds      Chronic diastolic heart failure: Stable, at baseline with peripheral edema  -Monitor for signs of decompensation  -Continue home regimen  -continue furosemide      Atrial fibrillation  -On Xarelto 20 mg daily  -Carvedilol 6.25 mg twice daily     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      Steve Phan, ASHLEY-CNP

## 2023-10-05 NOTE — PROGRESS NOTES
Shashi Gutierrez is a 78 y.o. male on day 0 of admission presenting with Weakness.       [facility], left vm for son saji [phone] to ask for more choices

## 2023-10-05 NOTE — PROGRESS NOTES
10/05/23 1641   Discharge Planning   Type of Residence Skilled nursing facility     Patient son left a message to send referral to [facility]-response pending

## 2023-10-06 VITALS
TEMPERATURE: 97.8 F | RESPIRATION RATE: 18 BRPM | SYSTOLIC BLOOD PRESSURE: 119 MMHG | OXYGEN SATURATION: 97 % | HEART RATE: 65 BPM | HEIGHT: 72 IN | BODY MASS INDEX: 28.37 KG/M2 | DIASTOLIC BLOOD PRESSURE: 70 MMHG | WEIGHT: 209.44 LBS

## 2023-10-06 PROCEDURE — 97530 THERAPEUTIC ACTIVITIES: CPT | Mod: GP | Performed by: PHYSICAL THERAPIST

## 2023-10-06 PROCEDURE — 2500000001 HC RX 250 WO HCPCS SELF ADMINISTERED DRUGS (ALT 637 FOR MEDICARE OP): Performed by: INTERNAL MEDICINE

## 2023-10-06 PROCEDURE — 99232 SBSQ HOSP IP/OBS MODERATE 35: CPT | Performed by: STUDENT IN AN ORGANIZED HEALTH CARE EDUCATION/TRAINING PROGRAM

## 2023-10-06 PROCEDURE — G0378 HOSPITAL OBSERVATION PER HR: HCPCS

## 2023-10-06 PROCEDURE — 2500000004 HC RX 250 GENERAL PHARMACY W/ HCPCS (ALT 636 FOR OP/ED): Mod: MUE | Performed by: INTERNAL MEDICINE

## 2023-10-06 PROCEDURE — 97116 GAIT TRAINING THERAPY: CPT | Mod: GP,59 | Performed by: PHYSICAL THERAPIST

## 2023-10-06 RX ORDER — LISINOPRIL 5 MG/1
5 TABLET ORAL DAILY
Qty: 30 TABLET | Refills: 0 | Status: SHIPPED | OUTPATIENT
Start: 2023-10-07 | End: 2023-10-12 | Stop reason: ALTCHOICE

## 2023-10-06 RX ORDER — FUROSEMIDE 40 MG/1
40 TABLET ORAL DAILY
Qty: 30 TABLET | Refills: 0 | Status: SHIPPED | OUTPATIENT
Start: 2023-10-07 | End: 2023-10-12 | Stop reason: SDUPTHER

## 2023-10-06 RX ORDER — CARBIDOPA AND LEVODOPA 25; 100 MG/1; MG/1
1 TABLET ORAL 3 TIMES DAILY
Qty: 90 TABLET | Refills: 0 | Status: SHIPPED | OUTPATIENT
Start: 2023-10-06 | End: 2024-06-27 | Stop reason: SDUPTHER

## 2023-10-06 RX ADMIN — POTASSIUM CHLORIDE 10 MEQ: 750 TABLET, EXTENDED RELEASE ORAL at 09:48

## 2023-10-06 RX ADMIN — CARVEDILOL 6.25 MG: 6.25 TABLET, FILM COATED ORAL at 09:48

## 2023-10-06 RX ADMIN — LISINOPRIL 5 MG: 5 TABLET ORAL at 09:48

## 2023-10-06 RX ADMIN — CARBIDOPA AND LEVODOPA 1 TABLET: 25; 100 TABLET ORAL at 09:48

## 2023-10-06 RX ADMIN — FUROSEMIDE 40 MG: 40 TABLET ORAL at 09:48

## 2023-10-06 RX ADMIN — RIVAROXABAN 20 MG: 20 TABLET, FILM COATED ORAL at 18:01

## 2023-10-06 RX ADMIN — DOCUSATE SODIUM 100 MG: 100 CAPSULE, LIQUID FILLED ORAL at 09:48

## 2023-10-06 RX ADMIN — CARBIDOPA AND LEVODOPA 1 TABLET: 25; 100 TABLET ORAL at 14:01

## 2023-10-06 RX ADMIN — Medication 1 TABLET: at 09:48

## 2023-10-06 RX ADMIN — FERROUS SULFATE TAB 325 MG (65 MG ELEMENTAL FE) 65 MG OF IRON: 325 (65 FE) TAB at 09:48

## 2023-10-06 ASSESSMENT — COGNITIVE AND FUNCTIONAL STATUS - GENERAL
DRESSING REGULAR LOWER BODY CLOTHING: A LOT
WALKING IN HOSPITAL ROOM: A LITTLE
MOVING TO AND FROM BED TO CHAIR: A LOT
WALKING IN HOSPITAL ROOM: A LOT
STANDING UP FROM CHAIR USING ARMS: A LOT
TURNING FROM BACK TO SIDE WHILE IN FLAT BAD: A LITTLE
MOVING FROM LYING ON BACK TO SITTING ON SIDE OF FLAT BED WITH BEDRAILS: A LITTLE
MOVING FROM LYING ON BACK TO SITTING ON SIDE OF FLAT BED WITH BEDRAILS: A LITTLE
MOBILITY SCORE: 18
CLIMB 3 TO 5 STEPS WITH RAILING: A LOT
MOBILITY SCORE: 14
TURNING FROM BACK TO SIDE WHILE IN FLAT BAD: A LITTLE
CLIMB 3 TO 5 STEPS WITH RAILING: A LITTLE
DAILY ACTIVITIY SCORE: 15
MOVING TO AND FROM BED TO CHAIR: A LITTLE
HELP NEEDED FOR BATHING: A LOT
DRESSING REGULAR UPPER BODY CLOTHING: A LOT
STANDING UP FROM CHAIR USING ARMS: A LITTLE
TOILETING: A LOT
PERSONAL GROOMING: A LITTLE

## 2023-10-06 ASSESSMENT — PAIN SCALES - GENERAL: PAINLEVEL_OUTOF10: 0 - NO PAIN

## 2023-10-06 ASSESSMENT — PAIN - FUNCTIONAL ASSESSMENT: PAIN_FUNCTIONAL_ASSESSMENT: 0-10

## 2023-10-06 NOTE — HOSPITAL COURSE
PARAS MCLAUGHLIN is a 78 year old Male with past medical history of paroxysmal atrial fibrillation (on Xarelto), CHF, hyperlipidemia, prostate cancer, essential tremor,  was brought to [facility] ED for worsening weakness and concern for UTI. Upon arrival to the ED, patient was found to have an abnormal UA concerning for a UTI. He completed a course of IV Rocephin 2 grams daily, however culture was not submitted. Weakness has been gradually progressing, he was seen and evaluated by PT and OT and recommendation is for SNF. Patient is now medically clear and stable for discharge back to his facility, with close follow up with his PCP within one week.

## 2023-10-06 NOTE — PROGRESS NOTES
"Shashi Gutierrez is a 78 y.o. male on day 0 of admission presenting with Weakness.    Subjective   Patient seen and examined at the bedside this morning. No acute overnight events.        Objective     Physical Exam  Constitutional:       General: He is not in acute distress.     Appearance: He is not ill-appearing.   HENT:      Head: Normocephalic and atraumatic.      Nose: Nose normal.      Mouth/Throat:      Mouth: Mucous membranes are moist.      Pharynx: Oropharynx is clear.   Eyes:      Pupils: Pupils are equal, round, and reactive to light.   Cardiovascular:      Rate and Rhythm: Normal rate and regular rhythm.   Pulmonary:      Effort: Pulmonary effort is normal.      Breath sounds: Normal breath sounds.   Abdominal:      General: Abdomen is flat.      Palpations: Abdomen is soft.   Musculoskeletal:      Cervical back: Normal range of motion and neck supple.   Skin:     General: Skin is warm and dry.       Constitutional: Alert active, cooperative not in acute distress  Eyes: PERRLA, clear sclera  ENMT: Moist mucosal membranes, no exudate  Head / Neck: Atraumatic, normocephalic, supple neck, JVP not visualized  Lungs: Patent airways, CTABL  Heart: RRR, S1S2, no murmurs appreciated, palpable pulses in all extremities  GI: Soft, NT, ND, bowel sounds present in all quadrants  MSK: Moves all extremities with effort, some LE restriction  of ROM, no joint edema  Extremities: Intact x 4, no peripheral edema  : No Tse catheter inserted  Breast: Deferred  Neurological: AAO x 3 to person, place and date, facial muscles symmetrical, sensation intact, strength 4/4, no acute focal neurological deficits appreciated  Psychological: Appropriate mood and behavior     Last Recorded Vitals  Blood pressure 115/61, pulse 68, temperature 36.4 °C (97.5 °F), resp. rate 17, height 1.828 m (5' 11.97\"), weight 95 kg (209 lb 7 oz), SpO2 94 %.  Intake/Output last 3 Shifts:  No intake/output data recorded.    Relevant Results            " Scheduled medications  atorvastatin, 20 mg, oral, Nightly  carbidopa-levodopa, 1 tablet, oral, TID  carvedilol, 6.25 mg, oral, BID  docusate sodium, 100 mg, oral, BID  ferrous sulfate, 65 mg of iron, oral, Daily with breakfast  furosemide, 40 mg, oral, Daily  lisinopril, 5 mg, oral, Daily  multivitamin with minerals, 1 tablet, oral, Daily  potassium chloride CR, 10 mEq, oral, Daily  rivaroxaban, 20 mg, oral, Daily with evening meal      Continuous medications     PRN medications  PRN medications: acetaminophen, albuterol, melatonin, ondansetron, oxyCODONE, polyethylene glycol, sennosides                  Assessment/Plan   Principal Problem:    Weakness    78 year old Male with past medical history of paroxysmal atrial fibrillation (on Xarelto), CHF, hyperlipidemia, prostate cancer, essential tremor, was brought to  [facility] ED for worsening weakness and found to have a UTI     UTI: Acute  -Ceftriaxone 2 g IV piggyback daily, completed course   -Urine culture not submitted in the ED     Weakness: Gradual onset, worsening in the setting of UTI  -PT OT: Appreciate recommendation  -awaiting placement, [facility] has no beds      Chronic diastolic heart failure: Stable, at baseline with peripheral edema  -Monitor for signs of decompensation  -Continue home regimen  -continue furosemide      Atrial fibrillation  -On Xarelto 20 mg daily  -Carvedilol 6.25 mg twice daily     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      Casa Snow MD

## 2023-10-06 NOTE — PROGRESS NOTES
10/6/2023 4:01 PM Son informed of 5:00 PM discharge transport time to [facility]. Nika LEWIS

## 2023-10-06 NOTE — DISCHARGE SUMMARY
Discharge Diagnosis  Weakness    Issues Requiring Follow-Up  Follow up with PCP within 1 week for hypertension regimen management    Continue PT/OT recommendations         Discharge Meds     Your medication list        START taking these medications        Instructions Last Dose Given Next Dose Due   lisinopril 5 mg tablet  Start taking on: October 7, 2023      Take 1 tablet (5 mg) by mouth once daily. Do not start before October 7, 2023.              CHANGE how you take these medications        Instructions Last Dose Given Next Dose Due   furosemide 40 mg tablet  Commonly known as: Lasix  Start taking on: October 7, 2023  What changed:   medication strength  how much to take      Take 1 tablet (40 mg) by mouth once daily. Do not start before October 7, 2023.              CONTINUE taking these medications        Instructions Last Dose Given Next Dose Due   acetaminophen 325 mg tablet  Commonly known as: Tylenol           albuterol 90 mcg/actuation inhaler  Commonly known as: ProAir HFA      Inhale 2 puffs every 6 hours if needed for shortness of breath or wheezing.       atorvastatin 20 mg tablet  Commonly known as: Lipitor      Take 1 tablet (20 mg) by mouth once daily.       carbidopa-levodopa  mg tablet  Commonly known as: Sinemet      Take 1 tablet by mouth 3 times a day.       carvedilol 6.25 mg tablet  Commonly known as: Coreg      Take 1 tablet (6.25 mg) by mouth 2 times a day with meals.       CENTRUM SILVER ORAL           docusate sodium 100 mg capsule  Commonly known as: Colace           ferrous sulfate 325 (65 Fe) MG tablet           fexofenadine 180 mg tablet  Commonly known as: Allegra           fluticasone 50 mcg/actuation nasal spray  Commonly known as: Flonase           potassium chloride CR 10 mEq ER tablet  Commonly known as: Klor-Con      Take 1 tablet (10 mEq) by mouth once daily.       rivaroxaban 20 mg tablet  Commonly known as: Xarelto      Take 1 tablet (20 mg) by mouth once daily.        sennosides 8.6 mg tablet  Commonly known as: Senokot      Take 1 tablet (8.6 mg) by mouth 2 times a day.       sildenafil 50 mg tablet  Commonly known as: Viagra                     Where to Get Your Medications        These medications were sent to [facility] #[id number] - [address] AT [address] [address]      Phone: [phone]   carbidopa-levodopa  mg tablet  furosemide 40 mg tablet  lisinopril 5 mg tablet         Test Results Pending At Discharge  Pending Labs       Order Current Status    Basic metabolic panel Collected (09/30/23 0717)    CBC Collected (09/30/23 0717)    Urine Culture Preliminary result            Hospital Course  PARAS MCLAUGHLIN is a 78 year old Male with past medical history of paroxysmal atrial fibrillation (on Xarelto), CHF, hyperlipidemia, prostate cancer, essential tremor,  was brought to [facility] ED for worsening weakness and concern for UTI. Upon arrival to the ED, patient was found to have an abnormal UA concerning for a UTI. He completed a course of IV Rocephin 2 grams daily, however culture was not submitted. Weakness has been gradually progressing, he was seen and evaluated by PT and OT and recommendation is for SNF. Patient is now medically clear and stable for discharge back to his facility, with close follow up with his PCP within one week.     Pertinent Physical Exam At Time of Discharge  Physical Exam    Outpatient Follow-Up  Future Appointments   Date Time Provider Department Center   10/12/2023  8:20 AM ASHLEY Esparza-CNP [id number]         Casa Snow MD

## 2023-10-06 NOTE — PROGRESS NOTES
Physical Therapy    Physical Therapy Treatment    Patient Name: Shashi Gutierrez  MRN: [id number]  Today's Date: 10/6/2023  Time Calculation  Start Time: 1145  Stop Time: 1209  Time Calculation (min): 24 min       Assessment/Plan   PT Assessment  PT Assessment Results: Decreased strength, Decreased endurance, Impaired balance, Decreased mobility, Decreased cognition, Impaired judgement, Decreased safety awareness  Rehab Prognosis: Good  Barriers to Discharge: Requiring substantial time for all mobility  Evaluation/Treatment Tolerance: Patient tolerated treatment well  Medical Staff Made Aware: Yes  Strengths: Support of extended family/friends  Barriers to Participation: Other (Comment) (cognitive deficits)  End of Session Communication: Bedside nurse  Assessment Comment: Pt progressing well with bed mobility, gait and transfers this visit. Appears to be moving more quickly and with greater ease than last visit. Pt remains at a high fall risk as well as at increased risk for long term functional decline without continued skilled PT in the acute setting and after discharge.   End of Session Patient Position: Up in chair, Alarm on (needs in reach)      PT Plan  Treatment/Interventions: Bed mobility, Transfer training, Gait training, Balance training, Neuromuscular re-education, Stair training, Strengthening, Endurance training, Therapeutic exercise, Therapeutic activity, Home exercise program  PT Plan: Skilled PT  PT Frequency: 3 times per week  PT Discharge Recommendations: Low intensity level of continued care  Equipment Recommended upon Discharge: Wheeled walker  PT Recommended Transfer Status: Contact guard, Assist x1, Assistive device      General Visit Information:   PT  Visit  PT Received On: 10/06/23  Response to Previous Treatment: Patient with no complaints from previous session.  General  Prior to Session Communication: Bedside nurse  Patient Position Received: Bed, 3 rail up, Alarm on  General Comment: Pt  agreeable to PT session.    Subjective   Precautions:  Precautions  Hearing/Visual Limitations: [place]  Medical Precautions: Fall precautions  Vital Signs:       Objective   Pain:  Pain Assessment  Pain Assessment: 0-10  Pain Score: 0 - No pain  Cognition:  Cognition  Overall Cognitive Status: Within Functional Limits  Orientation Level: Oriented X4 (Pt required vc's for month (stated it was September.))  Attention: Within Functional Limits  Processing Speed: Delayed (mildly)     Treatments:  Therapeutic Activity  Therapeutic Activity Performed: Yes  Therapeutic Activity 1: Pt educated in safe bed mobility technique moving supine->sit via sidelying; pt requires minimal VC's for technique and proper UE placements for upper body initiate rolling and to use R UE to push up into sitting from sidelying.  Therapeutic Activity 2: Pt provided instruction in safe sit<->stand technique to enable him to move in/out of bed/chair safely; pt required minimal verbal cues for proper hand placements and to scoot to edge of sitting surface to facilitate ease of sit->stand, and to line up to and reach back for sitting surface before sitting.    Bed Mobility  Bed Mobility: Yes  Bed Mobility 1  Bed Mobility 1: Supine to sitting  Level of Assistance 1: Minimum assistance, Minimal verbal cues    Ambulation/Gait Training  Ambulation/Gait Training Performed: Yes  Ambulation/Gait Training 1  Surface 1: Level tile  Device 1: Rolling walker  Gait Support Devices: Gait belt  Assistance 1: Contact guard  Quality of Gait 1: Narrow base of support (decreased step length, flexed posture)  Comments/Distance (ft) 1: 45'x2, 50'x2, 10'x1  Transfers  Transfer: Yes  Transfer 1  Transfer From 1: Sit to  Transfer to 1: Stand  Technique 1: Sit to stand  Transfer Device 1: Walker, Gait belt  Transfer Level of Assistance 1: Contact guard, Minimal tactile cues, Minimal verbal cues  Trials/Comments 1: 3 trials    Outcome Measures:  [facility] Basic Mobility  Turning  from your back to your side while in a flat bed without using bedrails: A little  Moving from lying on your back to sitting on the side of a flat bed without using bedrails: A little  Moving to and from bed to chair (including a wheelchair): A little  Standing up from a chair using your arms (e.g. wheelchair or bedside chair): A little  To walk in hospital room: A little  Climbing 3-5 steps with railing: A little  Basic Mobility - Total Score: 18    Education Documentation  Handouts, taught by Misty Kaye PT at 10/6/2023  1:26 PM.  Learner: Patient  Readiness: Acceptance  Method: Explanation  Response: Verbalizes Understanding, Demonstrated Understanding, Needs Reinforcement    Precautions, taught by Misty Kaye PT at 10/6/2023  1:26 PM.  Learner: Patient  Readiness: Acceptance  Method: Explanation  Response: Verbalizes Understanding, Demonstrated Understanding, Needs Reinforcement    Body Mechanics, taught by Misty Kaye PT at 10/6/2023  1:26 PM.  Learner: Patient  Readiness: Acceptance  Method: Explanation  Response: Verbalizes Understanding, Demonstrated Understanding, Needs Reinforcement    Home Exercise Program, taught by Misty Kaye PT at 10/6/2023  1:26 PM.  Learner: Patient  Readiness: Acceptance  Method: Explanation  Response: Verbalizes Understanding, Demonstrated Understanding, Needs Reinforcement    Mobility Training, taught by Misty Kaye PT at 10/6/2023  1:26 PM.  Learner: Patient  Readiness: Acceptance  Method: Explanation  Response: Verbalizes Understanding, Demonstrated Understanding, Needs Reinforcement    Education Comments  No comments found.        OP EDUCATION:       Encounter Problems       Encounter Problems (Active)       Balance       Balance- Goal transcribed from [facility] (Progressing)       Start:  10/01/23    Expected End:  10/13/23       Balance: Established Date 29-Sep-2023  Balance: Goal Details Patient to improve dynamic standing balance to good  in order to complete ADLs without LOB.  Balance: Time Frame for Goal 2 wks              Balance       STG- Pt will score >/= 18/28 on Tinetti gait and balance test to reduce his fall risk.  (Progressing)       Start:  10/04/23    Expected End:  10/18/23               Mobility       STG - Patient will ambulate (Met)       Start:  09/30/23    Expected End:  10/14/23    Resolved:  10/04/23    Updated to: STG - Patient will ambulate >150' with RW and modified independence.    Update reason: Goal met    >20ft using FWW with CGA         STG - Patient will ambulate >150' with RW and modified independence. (Progressing)       Start:  10/04/23    Expected End:  10/14/23       >20ft using FWW with CGA            Mobility       Standing Tolerance- Goal transcribed from [facility] (Progressing)       Start:  10/01/23    Expected End:  10/13/23       Standing Tolerance: Established Date 29-Sep-2023  Standing Tolerance: Goal Details Patient to tolerate standing 5-6 minutes for safe completion of ADLS.  Standing Tolerance: Time Frame for Goal 2 wks              Strength       Pt will demonstrate ability to complete dynamic seated activities (LE therapeutic exercises) over the course of 10 min with SBA      Goal 1 (Met)       Start:  09/30/23    Expected End:  10/14/23    Resolved:  10/04/23    Updated to: Goal 1- Improve B LE strength to >/= to 4/5 grossly throughout to enable achievement of gait and transfer goals.    Update reason: Pt achieved goal         Goal 1- Improve B LE strength to >/= to 4/5 grossly throughout to enable achievement of gait and transfer goals. (Progressing)       Start:  10/04/23    Expected End:  10/14/23                   Transfers       STG - Patient will perform bed mobility (Met)       Start:  09/30/23    Expected End:  10/14/23    Resolved:  10/04/23    Updated to: STG - Patient will perform bed mobility independently.    Update reason: Met goal     SBA         STG - Patient will transfer sit to and  from stand (Met)       Start:  09/30/23    Expected End:  10/14/23    Resolved:  10/04/23    Updated to: STG - Patient will transfer sit to and from stand independently with RW.    Update reason: Goal met    CGA with no verbal cues for sequencing         STG - Patient will perform bed mobility independently. (Progressing)       Start:  10/04/23    Expected End:  10/14/23       SBA         STG - Patient will transfer sit to and from stand independently with RW. (Progressing)       Start:  10/04/23    Expected End:  10/14/23       CGA with no verbal cues for sequencing            Transfers       Bed Mobility- Goal transcribed from [facility] (Progressing)       Start:  10/01/23    Expected End:  10/13/23       Bed Mobility: Date Established 29-Sep-2023  Bed Mobility: [place] Goal minimum assist (75% patients effort)  Bed Mobility: Time Frame for Goal 2 wks           Functional Transfer- Goal transcribed from [facility] (Progressing)       Start:  10/01/23    Expected End:  10/13/23       Functional Transfer: Established Date 29-Sep-2023  Functional Transfer: Goal Details Patient to perform all functional transfers at CGA with wheeled walker.  Functional Transfer: Time Frame for Goal 2 wks

## 2023-10-06 NOTE — CARE PLAN
Problem: Strength  Description: Pt will demonstrate ability to complete dynamic seated activities (LE therapeutic exercises) over the course of 10 min with SBA  Goal: Goal 1- Improve B LE strength to >/= to 4/5 grossly throughout to enable achievement of gait and transfer goals.  Outcome: Progressing     Problem: Mobility  Goal: STG - Patient will ambulate >150' with RW and modified independence.  Description: >20ft using FWW with CGA  Outcome: Progressing     Problem: Transfers  Goal: STG - Patient will perform bed mobility independently.  Description: SBA  Outcome: Progressing  Goal: STG - Patient will transfer sit to and from stand independently with RW.  Description: CGA with no verbal cues for sequencing  Outcome: Progressing     Problem: Balance  Goal: STG- Pt will score >/= 18/28 on Tinetti gait and balance test to reduce his fall risk.   Outcome: Progressing

## 2023-10-09 ENCOUNTER — NURSING HOME VISIT (OUTPATIENT)
Dept: POST ACUTE CARE | Facility: EXTERNAL LOCATION | Age: 78
End: 2023-10-09
Payer: COMMERCIAL

## 2023-10-09 DIAGNOSIS — R53.81 PHYSICAL DEBILITY: Primary | ICD-10-CM

## 2023-10-09 DIAGNOSIS — T78.40XA ALLERGY, INITIAL ENCOUNTER: ICD-10-CM

## 2023-10-09 DIAGNOSIS — T50.2X5A DIURETIC-INDUCED HYPOKALEMIA: ICD-10-CM

## 2023-10-09 DIAGNOSIS — J45.909 MODERATE ASTHMA, UNSPECIFIED WHETHER COMPLICATED, UNSPECIFIED WHETHER PERSISTENT (HHS-HCC): ICD-10-CM

## 2023-10-09 DIAGNOSIS — D63.1 ANEMIA OF CHRONIC RENAL FAILURE, STAGE 3A (MULTI): ICD-10-CM

## 2023-10-09 DIAGNOSIS — G20.A1 PARKINSON'S DISEASE WITHOUT DYSKINESIA, UNSPECIFIED WHETHER MANIFESTATIONS FLUCTUATE (MULTI): ICD-10-CM

## 2023-10-09 DIAGNOSIS — N18.31 ANEMIA OF CHRONIC RENAL FAILURE, STAGE 3A (MULTI): ICD-10-CM

## 2023-10-09 DIAGNOSIS — I48.0 PAROXYSMAL ATRIAL FIBRILLATION (MULTI): ICD-10-CM

## 2023-10-09 DIAGNOSIS — E78.5 HYPERLIPIDEMIA, UNSPECIFIED HYPERLIPIDEMIA TYPE: ICD-10-CM

## 2023-10-09 DIAGNOSIS — N52.9 ERECTILE DYSFUNCTION, UNSPECIFIED ERECTILE DYSFUNCTION TYPE: ICD-10-CM

## 2023-10-09 DIAGNOSIS — I50.9 CHRONIC CONGESTIVE HEART FAILURE, UNSPECIFIED HEART FAILURE TYPE (MULTI): ICD-10-CM

## 2023-10-09 DIAGNOSIS — E87.6 DIURETIC-INDUCED HYPOKALEMIA: ICD-10-CM

## 2023-10-09 PROCEDURE — G0317 PROLONG NURSING FAC EVAL 15M: HCPCS | Performed by: FAMILY MEDICINE

## 2023-10-09 PROCEDURE — 99310 SBSQ NF CARE HIGH MDM 45: CPT | Performed by: FAMILY MEDICINE

## 2023-10-09 NOTE — LETTER
Patient: Shashi Gutierrez  : 1945    Encounter Date: 10/09/2023    Nursing Home Visit  Name: Shashi Gutierrez  YOB: 1945  MRN: [id number]    Chief Complaint  Follow up on new admission  Subjective  HPI:    78 year old Male with past medical history of paroxysmal atrial fibrillation (on Xarelto), CHF, hyperlipidemia, prostate cancer, essential tremor,  was brought to [facility] ED for worsening weakness and concern for UTI. Upon arrival to the ED, patient was found to have an abnormal UA concerning for a UTI. He completed a course of IV Rocephin 2 grams daily, however culture was not submitted. Weakness has been gradually progressing, he was seen and evaluated by PT and OT and recommendation is for SNF. Patient is now medically clear and stable for discharge back to his facility, with close follow up with his PCP within one week.    10/9  Patient sitting in chair, looks comfortable.  States he felt weak which is why he went to the ED. Not sure of his appetite. Denies pain or discomfort. Denies HA, dizziness, SOB, CP, N&V or constipation    Review of Systems:  Reviewed chart looking at current medications, treatment, labs and x-rays and note pertinent positives or negatives, otherwise 10 point system review negative except for what is noted in HPI.    Objective  VS: 124/71, 97.4, 72, 20, 94%, 194#    Physical exam:   Physical Exam  Vitals and nursing note reviewed.   Constitutional:       General: He is awake. He is not in acute distress.     Appearance: Normal appearance. He is obese.   HENT:      Head: Normocephalic and atraumatic.      Nose: Nose normal.      Mouth/Throat:      Mouth: Mucous membranes are moist.      Pharynx: Oropharynx is clear.   Cardiovascular:      Rate and Rhythm: Normal rate and regular rhythm.      Heart sounds: Normal heart sounds.   Pulmonary:      Effort: Pulmonary effort is normal.      Breath sounds: Normal breath sounds.   Abdominal:      General: Bowel sounds are normal.       Palpations: Abdomen is soft.   Musculoskeletal:         General: Normal range of motion.   Skin:     General: Skin is warm and dry.   Neurological:      Mental Status: He is alert and oriented to person, place, and time.      Comments: forgetful   Psychiatric:         Mood and Affect: Mood normal.         Behavior: Behavior normal. Behavior is cooperative.      Comments: fatigued      Assessment/Plan     Physical debility   For PT/OT for mobility, gait training, endurance, safety awareness, ADLs, and assessment of post-rehab needs.      Parkinson's disease (CMS/HCC)  C/w Sinemet    CHF (congestive heart failure) (CMS/[facility])  C/w Lasix    Diuretic-induced hypokalemia  C/w KCL    Paroxysmal atrial fibrillation (CMS/[facility])  C/w xarelto    ED (erectile dysfunction)  C/w Viagra    Anemia of chronic renal failure, stage 3a (CMS/[facility])  C/w iron  HGB- 10.6     Hyperlipidemia  C/w atorvastatin    Asthma  C/w prn albuterol    Allergies  C/w allegra, Fluticasone Propionate          Electronically Signed By: ASHLEY Mario-CNP   10/10/23  8:55 PM

## 2023-10-10 PROBLEM — T78.40XA ALLERGIES: Status: ACTIVE | Noted: 2023-10-10

## 2023-10-10 PROBLEM — R53.81 PHYSICAL DEBILITY: Status: ACTIVE | Noted: 2023-03-27

## 2023-10-10 PROBLEM — T50.2X5A DIURETIC-INDUCED HYPOKALEMIA: Status: ACTIVE | Noted: 2023-10-10

## 2023-10-10 PROBLEM — E87.6 HYPOKALEMIA: Status: ACTIVE | Noted: 2023-10-10

## 2023-10-10 PROBLEM — E87.6 DIURETIC-INDUCED HYPOKALEMIA: Status: ACTIVE | Noted: 2023-10-10

## 2023-10-10 NOTE — PROGRESS NOTES
Nursing Home Visit  Name: Shashi Gutierrez  YOB: 1945  MRN: [id number]    Chief Complaint  Follow up on new admission  Subjective  HPI:    78 year old Male with past medical history of paroxysmal atrial fibrillation (on Xarelto), CHF, hyperlipidemia, prostate cancer, essential tremor,  was brought to [facility] ED for worsening weakness and concern for UTI. Upon arrival to the ED, patient was found to have an abnormal UA concerning for a UTI. He completed a course of IV Rocephin 2 grams daily, however culture was not submitted. Weakness has been gradually progressing, he was seen and evaluated by PT and OT and recommendation is for SNF. Patient is now medically clear and stable for discharge back to his facility, with close follow up with his PCP within one week.    10/9  Patient sitting in chair, looks comfortable.  States he felt weak which is why he went to the ED. Not sure of his appetite. Denies pain or discomfort. Denies HA, dizziness, SOB, CP, N&V or constipation    Review of Systems:  Reviewed chart looking at current medications, treatment, labs and x-rays and note pertinent positives or negatives, otherwise 10 point system review negative except for what is noted in HPI.    Objective  VS: 124/71, 97.4, 72, 20, 94%, 194#    Physical exam:   Physical Exam  Vitals and nursing note reviewed.   Constitutional:       General: He is awake. He is not in acute distress.     Appearance: Normal appearance. He is obese.   HENT:      Head: Normocephalic and atraumatic.      Nose: Nose normal.      Mouth/Throat:      Mouth: Mucous membranes are moist.      Pharynx: Oropharynx is clear.   Cardiovascular:      Rate and Rhythm: Normal rate and regular rhythm.      Heart sounds: Normal heart sounds.   Pulmonary:      Effort: Pulmonary effort is normal.      Breath sounds: Normal breath sounds.   Abdominal:      General: Bowel sounds are normal.      Palpations: Abdomen is soft.   Musculoskeletal:         General:  Normal range of motion.   Skin:     General: Skin is warm and dry.   Neurological:      Mental Status: He is alert and oriented to person, place, and time.      Comments: forgetful   Psychiatric:         Mood and Affect: Mood normal.         Behavior: Behavior normal. Behavior is cooperative.      Comments: fatigued      Assessment/Plan     Physical debility   For PT/OT for mobility, gait training, endurance, safety awareness, ADLs, and assessment of post-rehab needs.      Parkinson's disease (CMS/[facility])  C/w Sinemet    CHF (congestive heart failure) (CMS/[facility])  C/w Lasix    Diuretic-induced hypokalemia  C/w KCL    Paroxysmal atrial fibrillation (CMS/[facility])  C/w xarelto    ED (erectile dysfunction)  C/w Viagra    Anemia of chronic renal failure, stage 3a (CMS/[facility])  C/w iron  HGB- 10.6     Hyperlipidemia  C/w atorvastatin    Asthma  C/w prn albuterol    Allergies  C/w allegra, Fluticasone Propionate

## 2023-10-11 NOTE — ASSESSMENT & PLAN NOTE
>>ASSESSMENT AND PLAN FOR PAROXYSMAL ATRIAL FIBRILLATION (CMS/HCC) WRITTEN ON 10/10/2023  8:38 PM BY ASHLEY ELDER-MARCIAL    C/w walter

## 2023-10-11 NOTE — ASSESSMENT & PLAN NOTE
For PT/OT for mobility, gait training, endurance, safety awareness, ADLs, and assessment of post-rehab needs.

## 2023-10-12 ENCOUNTER — OFFICE VISIT (OUTPATIENT)
Dept: CARDIOLOGY | Facility: CLINIC | Age: 78
End: 2023-10-12
Payer: COMMERCIAL

## 2023-10-12 VITALS
SYSTOLIC BLOOD PRESSURE: 148 MMHG | HEIGHT: 70 IN | BODY MASS INDEX: 28.06 KG/M2 | HEART RATE: 86 BPM | OXYGEN SATURATION: 96 % | WEIGHT: 196 LBS | DIASTOLIC BLOOD PRESSURE: 88 MMHG

## 2023-10-12 DIAGNOSIS — I50.32 CHRONIC DIASTOLIC CONGESTIVE HEART FAILURE (MULTI): Primary | ICD-10-CM

## 2023-10-12 DIAGNOSIS — I10 HYPERTENSION, ESSENTIAL: ICD-10-CM

## 2023-10-12 PROCEDURE — 3079F DIAST BP 80-89 MM HG: CPT

## 2023-10-12 PROCEDURE — 1159F MED LIST DOCD IN RCRD: CPT

## 2023-10-12 PROCEDURE — 1126F AMNT PAIN NOTED NONE PRSNT: CPT

## 2023-10-12 PROCEDURE — 3077F SYST BP >= 140 MM HG: CPT

## 2023-10-12 PROCEDURE — 1036F TOBACCO NON-USER: CPT

## 2023-10-12 PROCEDURE — 99205 OFFICE O/P NEW HI 60 MIN: CPT

## 2023-10-12 PROCEDURE — 99215 OFFICE O/P EST HI 40 MIN: CPT

## 2023-10-12 RX ORDER — SPIRONOLACTONE 25 MG/1
25 TABLET ORAL DAILY
Qty: 30 TABLET | Refills: 5 | Status: SHIPPED | OUTPATIENT
Start: 2023-10-12 | End: 2024-04-09

## 2023-10-12 RX ORDER — LOSARTAN POTASSIUM 50 MG/1
50 TABLET ORAL DAILY
Qty: 90 TABLET | Refills: 2 | Status: SHIPPED | OUTPATIENT
Start: 2023-10-12 | End: 2024-10-11

## 2023-10-12 RX ORDER — FUROSEMIDE 40 MG/1
20 TABLET ORAL DAILY
Qty: 30 TABLET | Refills: 0 | Status: SHIPPED | OUTPATIENT
Start: 2023-10-12 | End: 2024-03-05

## 2023-10-12 ASSESSMENT — ENCOUNTER SYMPTOMS: OCCASIONAL FEELINGS OF UNSTEADINESS: 1

## 2023-10-12 NOTE — PATIENT INSTRUCTIONS
Mr. Gutierrez,    Decrease furosemide 20 mg daily, start spironolactone 25 mg daily and Jardiance 10 mg daily    There is an alert that he is allergic to Lisinopril unknown to patient. Therefore started losartan .    BMP in 1 week and BMP in 2 week since starting Spironolactone and please fax results to office .    Follow up in 4 weeks    Mai Melo APRN-CNP

## 2023-10-12 NOTE — PROGRESS NOTES
"Subjective   Mr. Shashi Gutierrez is a 78 year old male with past medical history of HFpEF (LVEF 60-65%, mod LVH on echo 8/2023, hypertension, atrial fibrillation on Xarelto who presents to heart failure     He was admitted to [facility] from 8/3/2023 - 8/8/2023 for heart failure, discharged to SNF where he stayed for 2 weeks and followed up with heart failure clinic. He was readmitted 9/29 with generalized weakness and found to have UTI and discharged to d/t generalized [facility] ([facility] He reports that he is feeling well and ready to go home where he lives with his son. He reports that he stopped Lisinopril d/t allergy. Unable to advise which allergy. Patient denies chest pain and angina.  Pt denies shortness of breath, dyspnea on exertion, orthopnea, and paroxysmal nocturnal dyspnea.  Pt denies worsening lower extremity edema.  Pt denies palpitations or syncope.  No recent falls.  No fever or chills.  No cough.  No change in bowel or bladder habits.  No sick contacts.  No recent travel.    Follow-up past Medical History: As above.    Medications Reviewed.    Alleries were reviewed.    Social History: Denies smoking EtOH, currently resides at [facility], when he is home he lives with his son.    Family History: No sudden cardiac death or premature cardiomyopathy.    12 point review of systems including (Constintutional, Eyes, ENMT, Respiratory, Cardiac, Gastrointestinal, Nuerological, Psychiatric, and Hematologic) was performed and is otherwise negative as noted in HPI.    Objective     Vitals:    10/12/23 0845   BP: 148/88   Pulse: 86   SpO2: 96%     Weight: 88.9 kg (196 lb)     9/18/2023 2121bs  Lab Results   Component Value Date    CHOL 205 (H) 12/05/2022    CHOL 194 12/17/2021    CHOL 186 08/26/2021     Lab Results   Component Value Date    HDL 51.4 12/05/2022    HDL 43.3 12/17/2021    HDL 45.3 08/26/2021     No results found for: \"LDLCALC\"  Lab Results   Component Value Date    TRIG 92 12/05/2022 "    TRIG 192 (H) 12/17/2021    TRIG 140 08/26/2021 12/5/2022       Physical Exam  Constitutional:       Appearance: Normal appearance.   Neck:      Vascular: No carotid bruit.   Cardiovascular:      Rate and Rhythm: Normal rate and regular rhythm.      Pulses: Normal pulses.      Heart sounds: Normal heart sounds.      No gallop.   Pulmonary:      Effort: Pulmonary effort is normal.      Breath sounds: Normal breath sounds.   Abdominal:      Palpations: Abdomen is soft.   Musculoskeletal:         General: Swelling (bilateral LE +2) present.      Cervical back: Neck supple.   Skin:     General: Skin is warm.      Capillary Refill: Capillary refill takes less than 2 seconds.   Neurological:      General: No focal deficit present.      Mental Status: He is alert and oriented to person, place, and time.   Psychiatric:         Mood and Affect: Mood normal.         Assessment/Plan     Mr. Shashi Gutierrez is a 78 year old male with past medical history of HFpEF (LVEF 60-65%, mod LVH on echo 8/2023, hypertension, atrial fibrillation on Xarelto who presents to heart failure     He was admitted to [facility] from 8/3/2023 - 8/8/2023 for heart failure, discharged to SNF where he stayed for 2 weeks and followed up with heart failure clinic. He was readmitted 9/29 with generalized weakness and found to have UTI and discharged to d/t generalized weakness ([facility]). He reports that he is feeling well and ready to go home where he lives with his son. He reports that he stopped Lisinopril d/t allergy. Unable to advise which allergy. He continues to have bilateral lower extremity edema.  However he is down 16 pounds from 9/18. He is mildly hypertensive.    - reduce Furosemide 20 mg daily    - Start Losartan 50 mg daily, spironolactone 25 mg daily, Jardiance 10 mg daily.    - BMP in 1 week and then at 2 weeks    - Patient to follow up in 4 weeks    - NH given instructions    Mai RAMIREZ-CNP

## 2023-10-16 ENCOUNTER — NURSING HOME VISIT (OUTPATIENT)
Dept: POST ACUTE CARE | Facility: EXTERNAL LOCATION | Age: 78
End: 2023-10-16
Payer: COMMERCIAL

## 2023-10-16 DIAGNOSIS — D63.1 ANEMIA OF CHRONIC RENAL FAILURE, STAGE 3A (MULTI): ICD-10-CM

## 2023-10-16 DIAGNOSIS — R53.1 WEAKNESS: ICD-10-CM

## 2023-10-16 DIAGNOSIS — I50.9 CHRONIC CONGESTIVE HEART FAILURE, UNSPECIFIED HEART FAILURE TYPE (MULTI): ICD-10-CM

## 2023-10-16 DIAGNOSIS — R53.81 PHYSICAL DEBILITY: Primary | ICD-10-CM

## 2023-10-16 DIAGNOSIS — N18.31 ANEMIA OF CHRONIC RENAL FAILURE, STAGE 3A (MULTI): ICD-10-CM

## 2023-10-16 DIAGNOSIS — T50.2X5A DIURETIC-INDUCED HYPOKALEMIA: ICD-10-CM

## 2023-10-16 DIAGNOSIS — N52.9 ERECTILE DYSFUNCTION, UNSPECIFIED ERECTILE DYSFUNCTION TYPE: ICD-10-CM

## 2023-10-16 DIAGNOSIS — G20.A1 PARKINSON'S DISEASE WITHOUT DYSKINESIA, UNSPECIFIED WHETHER MANIFESTATIONS FLUCTUATE (MULTI): ICD-10-CM

## 2023-10-16 DIAGNOSIS — T78.40XA ALLERGY, INITIAL ENCOUNTER: ICD-10-CM

## 2023-10-16 DIAGNOSIS — I48.0 PAROXYSMAL ATRIAL FIBRILLATION (MULTI): ICD-10-CM

## 2023-10-16 DIAGNOSIS — J45.909 MODERATE ASTHMA, UNSPECIFIED WHETHER COMPLICATED, UNSPECIFIED WHETHER PERSISTENT (HHS-HCC): ICD-10-CM

## 2023-10-16 DIAGNOSIS — E78.5 HYPERLIPIDEMIA, UNSPECIFIED HYPERLIPIDEMIA TYPE: ICD-10-CM

## 2023-10-16 DIAGNOSIS — E87.6 DIURETIC-INDUCED HYPOKALEMIA: ICD-10-CM

## 2023-10-16 PROCEDURE — 99316 NF DSCHRG MGMT 30 MIN+: CPT | Performed by: FAMILY MEDICINE

## 2023-10-16 NOTE — PROGRESS NOTES
Nursing Home Visit  Name: Shashi Gutierrez  YOB: 1945  MRN: [id number]    Chief Complaint   For discharge today  Subjective  HPI:    78 year old Male with past medical history of paroxysmal atrial fibrillation (on Xarelto), CHF, hyperlipidemia, prostate cancer, essential tremor,  was brought to [facility] ED for worsening weakness and concern for UTI. Upon arrival to the ED, patient was found to have an abnormal UA concerning for a UTI. He completed a course of IV Rocephin 2 grams daily, however culture was not submitted. Weakness has been gradually progressing, he was seen and evaluated by PT and OT and recommendation is for SNF. Patient is now medically clear and stable for discharge back to his facility, with close follow up with his PCP within one week.    10/9  Patient sitting in chair, looks comfortable.  States he felt weak which is why he went to the ED. Not sure of his appetite. Denies pain or discomfort. Denies HA, dizziness, SOB, CP, N&V or constipation    10/16  Patient is sitting in chair with clothes on states he is to go home today. He states he feels much better than when he came in  Review of Systems:  Reviewed chart looking at current medications, treatment, labs and x-rays and note pertinent positives or negatives, otherwise 10 point system review negative except for what is noted in HPI.    Objective  VS: 130/80, 98.3, 78, 18, 99%, 194#    Physical exam:   Physical Exam  Vitals and nursing note reviewed.   Constitutional:       General: He is awake. He is not in acute distress.     Appearance: Normal appearance. He is overweight.   HENT:      Head: Normocephalic and atraumatic.      Mouth/Throat:      Mouth: Mucous membranes are moist.      Pharynx: Oropharynx is clear.   Cardiovascular:      Rate and Rhythm: Normal rate and regular rhythm.      Heart sounds: Normal heart sounds.   Pulmonary:      Effort: Pulmonary effort is normal.      Breath sounds: Normal breath sounds.   Abdominal:       General: Bowel sounds are normal.      Palpations: Abdomen is soft.   Musculoskeletal:         General: Normal range of motion.   Skin:     General: Skin is warm and dry.   Neurological:      Mental Status: He is alert and oriented to person, place, and time.      Comments: forgetful   Psychiatric:         Mood and Affect: Mood normal.         Behavior: Behavior normal. Behavior is cooperative.         Thought Content: Thought content normal.         Judgment: Judgment normal.      Assessment/Plan   78 year old Male with past medical history of paroxysmal atrial fibrillation (on Xarelto), CHF, hyperlipidemia, prostate cancer, essential tremor,  was brought to [facility] ED for worsening weakness and concern for UTI.     Physical debility   For PT/OT for mobility, gait training, endurance, safety awareness, ADLs, and assessment of post-rehab needs. Is improved. Ok for discharge     Weakness  Resolved, is much improved    Parkinson's disease (CMS/[facility])  Note left hand tremor  C/w Sinemet.      CHF (congestive heart failure) (CMS/HCC)  C/w Lasix     Diuretic-induced hypokalemia  C/w KCL  is 4.2 today     Paroxysmal atrial fibrillation (CMS/HCC)  Stable, C/w xarelto     ED (erectile dysfunction)  C/w Viagra     Anemia of chronic renal failure, stage 3a (CMS/HCC)  C/w iron  HGB- 10.4 today      Hyperlipidemia  C/w atorvastatin     Asthma  C/w prn albuterol     Allergies  C/w allegra, Fluticasone Propionate     Patient is cleared for discharge, strength has improved he believes he is back to baseline.  Prescriptions written and given to nsg to give to patient upon discharge this evening.    All questions answered

## 2023-10-16 NOTE — LETTER
Patient: Shashi Gutierrez  : 1945    Encounter Date: 10/16/2023    Nursing Home Visit  Name: Shashi Gutierrez  YOB: 1945  MRN: [id number]    Chief Complaint   For discharge today  Subjective  HPI:    78 year old Male with past medical history of paroxysmal atrial fibrillation (on Xarelto), CHF, hyperlipidemia, prostate cancer, essential tremor,  was brought to [facility] ED for worsening weakness and concern for UTI. Upon arrival to the ED, patient was found to have an abnormal UA concerning for a UTI. He completed a course of IV Rocephin 2 grams daily, however culture was not submitted. Weakness has been gradually progressing, he was seen and evaluated by PT and OT and recommendation is for SNF. Patient is now medically clear and stable for discharge back to his facility, with close follow up with his PCP within one week.    10/9  Patient sitting in chair, looks comfortable.  States he felt weak which is why he went to the ED. Not sure of his appetite. Denies pain or discomfort. Denies HA, dizziness, SOB, CP, N&V or constipation    10/16  Patient is sitting in chair with clothes on states he is to go home today. He states he feels much better than when he came in  Review of Systems:  Reviewed chart looking at current medications, treatment, labs and x-rays and note pertinent positives or negatives, otherwise 10 point system review negative except for what is noted in HPI.    Objective  VS: 130/80, 98.3, 78, 18, 99%, 194#    Physical exam:   Physical Exam  Vitals and nursing note reviewed.   Constitutional:       General: He is awake. He is not in acute distress.     Appearance: Normal appearance. He is overweight.   HENT:      Head: Normocephalic and atraumatic.      Mouth/Throat:      Mouth: Mucous membranes are moist.      Pharynx: Oropharynx is clear.   Cardiovascular:      Rate and Rhythm: Normal rate and regular rhythm.      Heart sounds: Normal heart sounds.   Pulmonary:      Effort: Pulmonary  effort is normal.      Breath sounds: Normal breath sounds.   Abdominal:      General: Bowel sounds are normal.      Palpations: Abdomen is soft.   Musculoskeletal:         General: Normal range of motion.   Skin:     General: Skin is warm and dry.   Neurological:      Mental Status: He is alert and oriented to person, place, and time.      Comments: forgetful   Psychiatric:         Mood and Affect: Mood normal.         Behavior: Behavior normal. Behavior is cooperative.         Thought Content: Thought content normal.         Judgment: Judgment normal.      Assessment/Plan   78 year old Male with past medical history of paroxysmal atrial fibrillation (on Xarelto), CHF, hyperlipidemia, prostate cancer, essential tremor,  was brought to [facility] ED for worsening weakness and concern for UTI.     Physical debility   For PT/OT for mobility, gait training, endurance, safety awareness, ADLs, and assessment of post-rehab needs. Is improved. Ok for discharge     Weakness  Resolved, is much improved    Parkinson's disease (CMS/HCC)  Note left hand tremor  C/w Sinemet.      CHF (congestive heart failure) (CMS/HCC)  C/w Lasix     Diuretic-induced hypokalemia  C/w KCL  is 4.2 today     Paroxysmal atrial fibrillation (CMS/HCC)  Stable, C/w xarelto     ED (erectile dysfunction)  C/w Viagra     Anemia of chronic renal failure, stage 3a (CMS/HCC)  C/w iron  HGB- 10.4 today      Hyperlipidemia  C/w atorvastatin     Asthma  C/w prn albuterol     Allergies  C/w allegra, Fluticasone Propionate     Patient is cleared for discharge, strength has improved he believes he is back to baseline.  Prescriptions written and given to nsg to give to patient upon discharge this evening.    All questions answered       Electronically Signed By: TERENCE Mario   10/16/23  7:20 PM

## 2023-12-11 ENCOUNTER — APPOINTMENT (OUTPATIENT)
Dept: RADIOLOGY | Facility: HOSPITAL | Age: 78
End: 2023-12-11
Payer: COMMERCIAL

## 2023-12-11 ENCOUNTER — HOSPITAL ENCOUNTER (OUTPATIENT)
Facility: HOSPITAL | Age: 78
Setting detail: OBSERVATION
Discharge: NURSING, REHAB OR LONG-TERM CARE | End: 2023-12-14
Attending: EMERGENCY MEDICINE | Admitting: INTERNAL MEDICINE
Payer: COMMERCIAL

## 2023-12-11 DIAGNOSIS — R53.1 GENERALIZED WEAKNESS: Primary | ICD-10-CM

## 2023-12-11 DIAGNOSIS — R26.2 INABILITY TO WALK: ICD-10-CM

## 2023-12-11 DIAGNOSIS — U07.1 COVID-19: ICD-10-CM

## 2023-12-11 LAB
ALBUMIN SERPL BCP-MCNC: 4.2 G/DL (ref 3.4–5)
ALP SERPL-CCNC: 49 U/L (ref 33–136)
ALT SERPL W P-5'-P-CCNC: 8 U/L (ref 10–52)
ANION GAP SERPL CALC-SCNC: 13 MMOL/L (ref 10–20)
AST SERPL W P-5'-P-CCNC: 26 U/L (ref 9–39)
BASOPHILS # BLD AUTO: 0.02 X10*3/UL (ref 0–0.1)
BASOPHILS NFR BLD AUTO: 0.4 %
BILIRUB SERPL-MCNC: 0.9 MG/DL (ref 0–1.2)
BNP SERPL-MCNC: 100 PG/ML (ref 0–99)
BUN SERPL-MCNC: 14 MG/DL (ref 6–23)
CALCIUM SERPL-MCNC: 9.8 MG/DL (ref 8.6–10.3)
CARDIAC TROPONIN I PNL SERPL HS: 12 NG/L (ref 0–20)
CHLORIDE SERPL-SCNC: 98 MMOL/L (ref 98–107)
CO2 SERPL-SCNC: 29 MMOL/L (ref 21–32)
CREAT SERPL-MCNC: 1.28 MG/DL (ref 0.5–1.3)
EOSINOPHIL # BLD AUTO: 0.03 X10*3/UL (ref 0–0.4)
EOSINOPHIL NFR BLD AUTO: 0.5 %
ERYTHROCYTE [DISTWIDTH] IN BLOOD BY AUTOMATED COUNT: 12.9 % (ref 11.5–14.5)
FLUAV RNA RESP QL NAA+PROBE: NOT DETECTED
FLUBV RNA RESP QL NAA+PROBE: NOT DETECTED
GFR SERPL CREATININE-BSD FRML MDRD: 58 ML/MIN/1.73M*2
GLUCOSE SERPL-MCNC: 84 MG/DL (ref 74–99)
HCT VFR BLD AUTO: 39.5 % (ref 41–52)
HGB BLD-MCNC: 12.4 G/DL (ref 13.5–17.5)
IMM GRANULOCYTES # BLD AUTO: 0.03 X10*3/UL (ref 0–0.5)
IMM GRANULOCYTES NFR BLD AUTO: 0.5 % (ref 0–0.9)
INR PPP: 2.2 (ref 0.9–1.1)
LIPASE SERPL-CCNC: 23 U/L (ref 9–82)
LYMPHOCYTES # BLD AUTO: 0.46 X10*3/UL (ref 0.8–3)
LYMPHOCYTES NFR BLD AUTO: 8.3 %
MAGNESIUM SERPL-MCNC: 2.1 MG/DL (ref 1.6–2.4)
MCH RBC QN AUTO: 27.1 PG (ref 26–34)
MCHC RBC AUTO-ENTMCNC: 31.4 G/DL (ref 32–36)
MCV RBC AUTO: 86 FL (ref 80–100)
MONOCYTES # BLD AUTO: 0.85 X10*3/UL (ref 0.05–0.8)
MONOCYTES NFR BLD AUTO: 15.4 %
NEUTROPHILS # BLD AUTO: 4.13 X10*3/UL (ref 1.6–5.5)
NEUTROPHILS NFR BLD AUTO: 74.9 %
NRBC BLD-RTO: 0 /100 WBCS (ref 0–0)
PLATELET # BLD AUTO: 218 X10*3/UL (ref 150–450)
POTASSIUM SERPL-SCNC: 3.9 MMOL/L (ref 3.5–5.3)
PROT SERPL-MCNC: 8.1 G/DL (ref 6.4–8.2)
PROTHROMBIN TIME: 25.3 SECONDS (ref 9.8–12.8)
RBC # BLD AUTO: 4.57 X10*6/UL (ref 4.5–5.9)
RSV RNA RESP QL NAA+PROBE: NOT DETECTED
SARS-COV-2 RNA RESP QL NAA+PROBE: DETECTED
SODIUM SERPL-SCNC: 136 MMOL/L (ref 136–145)
TSH SERPL-ACNC: 0.35 MIU/L (ref 0.44–3.98)
WBC # BLD AUTO: 5.5 X10*3/UL (ref 4.4–11.3)

## 2023-12-11 PROCEDURE — 71045 X-RAY EXAM CHEST 1 VIEW: CPT | Mod: FY,FR

## 2023-12-11 PROCEDURE — 85610 PROTHROMBIN TIME: CPT | Performed by: EMERGENCY MEDICINE

## 2023-12-11 PROCEDURE — 99285 EMERGENCY DEPT VISIT HI MDM: CPT | Performed by: EMERGENCY MEDICINE

## 2023-12-11 PROCEDURE — 84484 ASSAY OF TROPONIN QUANT: CPT | Performed by: EMERGENCY MEDICINE

## 2023-12-11 PROCEDURE — 2500000004 HC RX 250 GENERAL PHARMACY W/ HCPCS (ALT 636 FOR OP/ED): Performed by: EMERGENCY MEDICINE

## 2023-12-11 PROCEDURE — 84443 ASSAY THYROID STIM HORMONE: CPT | Performed by: EMERGENCY MEDICINE

## 2023-12-11 PROCEDURE — 83690 ASSAY OF LIPASE: CPT | Performed by: EMERGENCY MEDICINE

## 2023-12-11 PROCEDURE — 83880 ASSAY OF NATRIURETIC PEPTIDE: CPT | Performed by: EMERGENCY MEDICINE

## 2023-12-11 PROCEDURE — 80053 COMPREHEN METABOLIC PANEL: CPT | Performed by: EMERGENCY MEDICINE

## 2023-12-11 PROCEDURE — 71045 X-RAY EXAM CHEST 1 VIEW: CPT | Mod: FOREIGN READ | Performed by: RADIOLOGY

## 2023-12-11 PROCEDURE — 96361 HYDRATE IV INFUSION ADD-ON: CPT

## 2023-12-11 PROCEDURE — 85025 COMPLETE CBC W/AUTO DIFF WBC: CPT | Performed by: EMERGENCY MEDICINE

## 2023-12-11 PROCEDURE — 36415 COLL VENOUS BLD VENIPUNCTURE: CPT | Performed by: EMERGENCY MEDICINE

## 2023-12-11 PROCEDURE — 83735 ASSAY OF MAGNESIUM: CPT | Performed by: EMERGENCY MEDICINE

## 2023-12-11 PROCEDURE — 84439 ASSAY OF FREE THYROXINE: CPT | Performed by: EMERGENCY MEDICINE

## 2023-12-11 PROCEDURE — 87637 SARSCOV2&INF A&B&RSV AMP PRB: CPT | Performed by: EMERGENCY MEDICINE

## 2023-12-11 RX ORDER — ACETAMINOPHEN 325 MG/1
975 TABLET ORAL ONCE
Status: COMPLETED | OUTPATIENT
Start: 2023-12-11 | End: 2023-12-11

## 2023-12-11 RX ADMIN — ACETAMINOPHEN 975 MG: 325 TABLET ORAL at 22:58

## 2023-12-11 RX ADMIN — SODIUM CHLORIDE, POTASSIUM CHLORIDE, SODIUM LACTATE AND CALCIUM CHLORIDE 500 ML: 600; 310; 30; 20 INJECTION, SOLUTION INTRAVENOUS at 22:58

## 2023-12-12 PROBLEM — R53.1 GENERALIZED WEAKNESS: Status: ACTIVE | Noted: 2023-12-12

## 2023-12-12 LAB
ANION GAP SERPL CALC-SCNC: 12 MMOL/L (ref 10–20)
APPEARANCE UR: CLEAR
BILIRUB UR STRIP.AUTO-MCNC: NEGATIVE MG/DL
BUN SERPL-MCNC: 15 MG/DL (ref 6–23)
CALCIUM SERPL-MCNC: 9 MG/DL (ref 8.6–10.3)
CHLORIDE SERPL-SCNC: 101 MMOL/L (ref 98–107)
CO2 SERPL-SCNC: 26 MMOL/L (ref 21–32)
COLOR UR: YELLOW
CREAT SERPL-MCNC: 1.12 MG/DL (ref 0.5–1.3)
D DIMER PPP FEU-MCNC: <215 NG/ML FEU
ERYTHROCYTE [DISTWIDTH] IN BLOOD BY AUTOMATED COUNT: 13.1 % (ref 11.5–14.5)
GFR SERPL CREATININE-BSD FRML MDRD: 68 ML/MIN/1.73M*2
GLUCOSE BLD MANUAL STRIP-MCNC: 101 MG/DL (ref 74–99)
GLUCOSE BLD MANUAL STRIP-MCNC: 116 MG/DL (ref 74–99)
GLUCOSE SERPL-MCNC: 93 MG/DL (ref 74–99)
GLUCOSE UR STRIP.AUTO-MCNC: NEGATIVE MG/DL
HCT VFR BLD AUTO: 39 % (ref 41–52)
HGB BLD-MCNC: 11.3 G/DL (ref 13.5–17.5)
HOLD SPECIMEN: NORMAL
KETONES UR STRIP.AUTO-MCNC: NEGATIVE MG/DL
LACTATE SERPL-SCNC: 0.7 MMOL/L (ref 0.4–2)
LEUKOCYTE ESTERASE UR QL STRIP.AUTO: NEGATIVE
MCH RBC QN AUTO: 27.2 PG (ref 26–34)
MCHC RBC AUTO-ENTMCNC: 29 G/DL (ref 32–36)
MCV RBC AUTO: 94 FL (ref 80–100)
NITRITE UR QL STRIP.AUTO: NEGATIVE
NRBC BLD-RTO: 0 /100 WBCS (ref 0–0)
PH UR STRIP.AUTO: 7 [PH]
PLATELET # BLD AUTO: 169 X10*3/UL (ref 150–450)
POTASSIUM SERPL-SCNC: 4.1 MMOL/L (ref 3.5–5.3)
PROT UR STRIP.AUTO-MCNC: ABNORMAL MG/DL
RBC # BLD AUTO: 4.15 X10*6/UL (ref 4.5–5.9)
RBC # UR STRIP.AUTO: NEGATIVE /UL
RBC #/AREA URNS AUTO: NORMAL /HPF
SODIUM SERPL-SCNC: 135 MMOL/L (ref 136–145)
SP GR UR STRIP.AUTO: 1.02
SQUAMOUS #/AREA URNS AUTO: NORMAL /HPF
T4 FREE SERPL-MCNC: 0.67 NG/DL (ref 0.61–1.12)
UROBILINOGEN UR STRIP.AUTO-MCNC: 4 MG/DL
WBC # BLD AUTO: 4.3 X10*3/UL (ref 4.4–11.3)
WBC #/AREA URNS AUTO: NORMAL /HPF

## 2023-12-12 PROCEDURE — 36415 COLL VENOUS BLD VENIPUNCTURE: CPT | Performed by: NURSE PRACTITIONER

## 2023-12-12 PROCEDURE — 85379 FIBRIN DEGRADATION QUANT: CPT | Performed by: NURSE PRACTITIONER

## 2023-12-12 PROCEDURE — 94760 N-INVAS EAR/PLS OXIMETRY 1: CPT

## 2023-12-12 PROCEDURE — 83605 ASSAY OF LACTIC ACID: CPT | Performed by: NURSE PRACTITIONER

## 2023-12-12 PROCEDURE — 85027 COMPLETE CBC AUTOMATED: CPT | Performed by: PHYSICIAN ASSISTANT

## 2023-12-12 PROCEDURE — 96374 THER/PROPH/DIAG INJ IV PUSH: CPT

## 2023-12-12 PROCEDURE — 81001 URINALYSIS AUTO W/SCOPE: CPT | Performed by: EMERGENCY MEDICINE

## 2023-12-12 PROCEDURE — G0378 HOSPITAL OBSERVATION PER HR: HCPCS

## 2023-12-12 PROCEDURE — 2500000004 HC RX 250 GENERAL PHARMACY W/ HCPCS (ALT 636 FOR OP/ED): Mod: MUE | Performed by: PHYSICIAN ASSISTANT

## 2023-12-12 PROCEDURE — 36415 COLL VENOUS BLD VENIPUNCTURE: CPT | Performed by: PHYSICIAN ASSISTANT

## 2023-12-12 PROCEDURE — 2500000001 HC RX 250 WO HCPCS SELF ADMINISTERED DRUGS (ALT 637 FOR MEDICARE OP): Performed by: NURSE PRACTITIONER

## 2023-12-12 PROCEDURE — 82947 ASSAY GLUCOSE BLOOD QUANT: CPT | Mod: 59

## 2023-12-12 PROCEDURE — 2500000004 HC RX 250 GENERAL PHARMACY W/ HCPCS (ALT 636 FOR OP/ED): Performed by: INTERNAL MEDICINE

## 2023-12-12 PROCEDURE — 2500000004 HC RX 250 GENERAL PHARMACY W/ HCPCS (ALT 636 FOR OP/ED): Mod: MUE | Performed by: NURSE PRACTITIONER

## 2023-12-12 PROCEDURE — 99222 1ST HOSP IP/OBS MODERATE 55: CPT | Performed by: NURSE PRACTITIONER

## 2023-12-12 PROCEDURE — 80048 BASIC METABOLIC PNL TOTAL CA: CPT | Performed by: PHYSICIAN ASSISTANT

## 2023-12-12 RX ORDER — PANTOPRAZOLE SODIUM 40 MG/10ML
40 INJECTION, POWDER, LYOPHILIZED, FOR SOLUTION INTRAVENOUS
Status: DISCONTINUED | OUTPATIENT
Start: 2023-12-12 | End: 2023-12-14 | Stop reason: HOSPADM

## 2023-12-12 RX ORDER — CARBIDOPA AND LEVODOPA 25; 100 MG/1; MG/1
1 TABLET ORAL 3 TIMES DAILY
Status: DISCONTINUED | OUTPATIENT
Start: 2023-12-12 | End: 2023-12-14 | Stop reason: HOSPADM

## 2023-12-12 RX ORDER — DEXAMETHASONE SODIUM PHOSPHATE 10 MG/ML
6 INJECTION INTRAMUSCULAR; INTRAVENOUS ONCE
Status: COMPLETED | OUTPATIENT
Start: 2023-12-12 | End: 2023-12-12

## 2023-12-12 RX ORDER — PANTOPRAZOLE SODIUM 40 MG/1
40 TABLET, DELAYED RELEASE ORAL
Status: DISCONTINUED | OUTPATIENT
Start: 2023-12-12 | End: 2023-12-14 | Stop reason: HOSPADM

## 2023-12-12 RX ORDER — SPIRONOLACTONE 25 MG/1
25 TABLET ORAL DAILY
Status: DISCONTINUED | OUTPATIENT
Start: 2023-12-12 | End: 2023-12-14 | Stop reason: HOSPADM

## 2023-12-12 RX ORDER — LORATADINE 10 MG/1
10 TABLET ORAL DAILY PRN
Status: DISCONTINUED | OUTPATIENT
Start: 2023-12-12 | End: 2023-12-14 | Stop reason: HOSPADM

## 2023-12-12 RX ORDER — ACETAMINOPHEN 325 MG/1
950 TABLET ORAL EVERY 6 HOURS PRN
Status: DISCONTINUED | OUTPATIENT
Start: 2023-12-12 | End: 2023-12-14 | Stop reason: HOSPADM

## 2023-12-12 RX ORDER — TALC
3 POWDER (GRAM) TOPICAL
Status: DISCONTINUED | OUTPATIENT
Start: 2023-12-12 | End: 2023-12-14 | Stop reason: HOSPADM

## 2023-12-12 RX ORDER — FUROSEMIDE 20 MG/1
20 TABLET ORAL DAILY
Status: DISCONTINUED | OUTPATIENT
Start: 2023-12-12 | End: 2023-12-14 | Stop reason: HOSPADM

## 2023-12-12 RX ORDER — LOSARTAN POTASSIUM 50 MG/1
50 TABLET ORAL DAILY
Status: DISCONTINUED | OUTPATIENT
Start: 2023-12-12 | End: 2023-12-14 | Stop reason: HOSPADM

## 2023-12-12 RX ORDER — SENNOSIDES 8.6 MG/1
1 TABLET ORAL 2 TIMES DAILY
Status: DISCONTINUED | OUTPATIENT
Start: 2023-12-12 | End: 2023-12-14 | Stop reason: HOSPADM

## 2023-12-12 RX ORDER — FLUTICASONE PROPIONATE 50 MCG
2 SPRAY, SUSPENSION (ML) NASAL DAILY PRN
Status: DISCONTINUED | OUTPATIENT
Start: 2023-12-12 | End: 2023-12-14 | Stop reason: HOSPADM

## 2023-12-12 RX ORDER — ATORVASTATIN CALCIUM 20 MG/1
20 TABLET, FILM COATED ORAL DAILY
Status: DISCONTINUED | OUTPATIENT
Start: 2023-12-12 | End: 2023-12-14 | Stop reason: HOSPADM

## 2023-12-12 RX ORDER — ALBUTEROL SULFATE 90 UG/1
2 AEROSOL, METERED RESPIRATORY (INHALATION) EVERY 6 HOURS PRN
Status: DISCONTINUED | OUTPATIENT
Start: 2023-12-12 | End: 2023-12-14 | Stop reason: HOSPADM

## 2023-12-12 RX ORDER — DOCUSATE SODIUM 100 MG/1
100 CAPSULE, LIQUID FILLED ORAL DAILY
Status: DISCONTINUED | OUTPATIENT
Start: 2023-12-12 | End: 2023-12-14 | Stop reason: HOSPADM

## 2023-12-12 RX ORDER — DOCUSATE SODIUM 100 MG/1
100 CAPSULE, LIQUID FILLED ORAL 2 TIMES DAILY PRN
Status: DISCONTINUED | OUTPATIENT
Start: 2023-12-12 | End: 2023-12-12

## 2023-12-12 RX ADMIN — SENNOSIDES 8.6 MG: 8.6 TABLET, FILM COATED ORAL at 21:52

## 2023-12-12 RX ADMIN — PANTOPRAZOLE SODIUM 40 MG: 40 TABLET, DELAYED RELEASE ORAL at 06:39

## 2023-12-12 RX ADMIN — SPIRONOLACTONE 25 MG: 25 TABLET ORAL at 09:00

## 2023-12-12 RX ADMIN — DOCUSATE SODIUM 100 MG: 100 CAPSULE, LIQUID FILLED ORAL at 09:11

## 2023-12-12 RX ADMIN — CARBIDOPA AND LEVODOPA 1 TABLET: 25; 100 TABLET ORAL at 21:52

## 2023-12-12 RX ADMIN — ACETAMINOPHEN 975 MG: 325 TABLET ORAL at 06:39

## 2023-12-12 RX ADMIN — FUROSEMIDE 20 MG: 20 TABLET ORAL at 09:00

## 2023-12-12 RX ADMIN — CARBIDOPA AND LEVODOPA 1 TABLET: 25; 100 TABLET ORAL at 15:21

## 2023-12-12 RX ADMIN — FUROSEMIDE 20 MG: 20 TABLET ORAL at 09:41

## 2023-12-12 RX ADMIN — ATORVASTATIN CALCIUM 20 MG: 20 TABLET, FILM COATED ORAL at 09:12

## 2023-12-12 RX ADMIN — LOSARTAN POTASSIUM 50 MG: 50 TABLET, FILM COATED ORAL at 09:41

## 2023-12-12 RX ADMIN — RIVAROXABAN 20 MG: 20 TABLET, FILM COATED ORAL at 17:37

## 2023-12-12 RX ADMIN — DEXAMETHASONE SODIUM PHOSPHATE 6 MG: 10 INJECTION, SOLUTION INTRAMUSCULAR; INTRAVENOUS at 12:45

## 2023-12-12 RX ADMIN — CARBIDOPA AND LEVODOPA 1 TABLET: 25; 100 TABLET ORAL at 09:12

## 2023-12-12 RX ADMIN — SPIRONOLACTONE 25 MG: 25 TABLET ORAL at 09:40

## 2023-12-12 SDOH — SOCIAL STABILITY: SOCIAL INSECURITY: DO YOU FEEL ANYONE HAS EXPLOITED OR TAKEN ADVANTAGE OF YOU FINANCIALLY OR OF YOUR PERSONAL PROPERTY?: NO

## 2023-12-12 SDOH — SOCIAL STABILITY: SOCIAL INSECURITY: HAVE YOU HAD THOUGHTS OF HARMING ANYONE ELSE?: NO

## 2023-12-12 SDOH — SOCIAL STABILITY: SOCIAL INSECURITY: DO YOU FEEL UNSAFE GOING BACK TO THE PLACE WHERE YOU ARE LIVING?: NO

## 2023-12-12 SDOH — SOCIAL STABILITY: SOCIAL INSECURITY: ARE YOU OR HAVE YOU BEEN THREATENED OR ABUSED PHYSICALLY, EMOTIONALLY, OR SEXUALLY BY ANYONE?: NO

## 2023-12-12 SDOH — SOCIAL STABILITY: SOCIAL INSECURITY: DOES ANYONE TRY TO KEEP YOU FROM HAVING/CONTACTING OTHER FRIENDS OR DOING THINGS OUTSIDE YOUR HOME?: NO

## 2023-12-12 SDOH — SOCIAL STABILITY: SOCIAL INSECURITY: ABUSE: ADULT

## 2023-12-12 SDOH — SOCIAL STABILITY: SOCIAL INSECURITY: WERE YOU ABLE TO COMPLETE ALL THE BEHAVIORAL HEALTH SCREENINGS?: YES

## 2023-12-12 SDOH — SOCIAL STABILITY: SOCIAL INSECURITY: ARE THERE ANY APPARENT SIGNS OF INJURIES/BEHAVIORS THAT COULD BE RELATED TO ABUSE/NEGLECT?: NO

## 2023-12-12 SDOH — SOCIAL STABILITY: SOCIAL INSECURITY: HAS ANYONE EVER THREATENED TO HURT YOUR FAMILY OR YOUR PETS?: NO

## 2023-12-12 ASSESSMENT — ENCOUNTER SYMPTOMS
FATIGUE: 1
COUGH: 1
GASTROINTESTINAL NEGATIVE: 1
WEAKNESS: 1
HEMATOLOGIC/LYMPHATIC NEGATIVE: 1
EYES NEGATIVE: 1
MUSCULOSKELETAL NEGATIVE: 1
PSYCHIATRIC NEGATIVE: 1

## 2023-12-12 ASSESSMENT — COGNITIVE AND FUNCTIONAL STATUS - GENERAL
MOVING FROM LYING ON BACK TO SITTING ON SIDE OF FLAT BED WITH BEDRAILS: A LITTLE
TURNING FROM BACK TO SIDE WHILE IN FLAT BAD: A LITTLE
STANDING UP FROM CHAIR USING ARMS: A LOT
MOBILITY SCORE: 12
DRESSING REGULAR UPPER BODY CLOTHING: A LITTLE
MOVING TO AND FROM BED TO CHAIR: A LOT
CLIMB 3 TO 5 STEPS WITH RAILING: TOTAL
DRESSING REGULAR UPPER BODY CLOTHING: A LITTLE
TURNING FROM BACK TO SIDE WHILE IN FLAT BAD: A LITTLE
MOVING TO AND FROM BED TO CHAIR: A LOT
PATIENT BASELINE BEDBOUND: NO
HELP NEEDED FOR BATHING: A LITTLE
DAILY ACTIVITIY SCORE: 19
MOVING TO AND FROM BED TO CHAIR: A LOT
MOBILITY SCORE: 12
WALKING IN HOSPITAL ROOM: TOTAL
TOILETING: A LOT
MOVING FROM LYING ON BACK TO SITTING ON SIDE OF FLAT BED WITH BEDRAILS: A LITTLE
TOILETING: A LOT
CLIMB 3 TO 5 STEPS WITH RAILING: TOTAL
MOVING FROM LYING ON BACK TO SITTING ON SIDE OF FLAT BED WITH BEDRAILS: A LITTLE
DRESSING REGULAR LOWER BODY CLOTHING: A LITTLE
STANDING UP FROM CHAIR USING ARMS: A LOT
CLIMB 3 TO 5 STEPS WITH RAILING: TOTAL
MOBILITY SCORE: 12
STANDING UP FROM CHAIR USING ARMS: A LOT
WALKING IN HOSPITAL ROOM: TOTAL
WALKING IN HOSPITAL ROOM: TOTAL
TURNING FROM BACK TO SIDE WHILE IN FLAT BAD: A LITTLE
HELP NEEDED FOR BATHING: A LITTLE
DAILY ACTIVITIY SCORE: 19
DRESSING REGULAR LOWER BODY CLOTHING: A LITTLE

## 2023-12-12 ASSESSMENT — COLUMBIA-SUICIDE SEVERITY RATING SCALE - C-SSRS
6. HAVE YOU EVER DONE ANYTHING, STARTED TO DO ANYTHING, OR PREPARED TO DO ANYTHING TO END YOUR LIFE?: NO
2. HAVE YOU ACTUALLY HAD ANY THOUGHTS OF KILLING YOURSELF?: NO
1. IN THE PAST MONTH, HAVE YOU WISHED YOU WERE DEAD OR WISHED YOU COULD GO TO SLEEP AND NOT WAKE UP?: NO

## 2023-12-12 ASSESSMENT — ACTIVITIES OF DAILY LIVING (ADL)
TOILETING: DEPENDENT
HEARING - LEFT EAR: FUNCTIONAL
FEEDING YOURSELF: INDEPENDENT
GROOMING: UNABLE TO ASSESS
WALKS IN HOME: INDEPENDENT
PATIENT'S MEMORY ADEQUATE TO SAFELY COMPLETE DAILY ACTIVITIES?: YES
LACK_OF_TRANSPORTATION: NO
ASSISTIVE_DEVICE: WALKER;CANE
BATHING: UNABLE TO ASSESS
DRESSING YOURSELF: UNABLE TO ASSESS
ADEQUATE_TO_COMPLETE_ADL: YES
JUDGMENT_ADEQUATE_SAFELY_COMPLETE_DAILY_ACTIVITIES: YES
HEARING - RIGHT EAR: FUNCTIONAL

## 2023-12-12 ASSESSMENT — PAIN DESCRIPTION - ORIENTATION: ORIENTATION: RIGHT;LEFT

## 2023-12-12 ASSESSMENT — PAIN SCALES - GENERAL
PAINLEVEL_OUTOF10: 5 - MODERATE PAIN

## 2023-12-12 ASSESSMENT — PATIENT HEALTH QUESTIONNAIRE - PHQ9
SUM OF ALL RESPONSES TO PHQ9 QUESTIONS 1 & 2: 0
1. LITTLE INTEREST OR PLEASURE IN DOING THINGS: NOT AT ALL
2. FEELING DOWN, DEPRESSED OR HOPELESS: NOT AT ALL

## 2023-12-12 ASSESSMENT — LIFESTYLE VARIABLES
AUDIT-C TOTAL SCORE: 0
AUDIT-C TOTAL SCORE: 0
HOW MANY STANDARD DRINKS CONTAINING ALCOHOL DO YOU HAVE ON A TYPICAL DAY: PATIENT DOES NOT DRINK
HOW OFTEN DO YOU HAVE 6 OR MORE DRINKS ON ONE OCCASION: NEVER
SKIP TO QUESTIONS 9-10: 1
HOW OFTEN DO YOU HAVE A DRINK CONTAINING ALCOHOL: NEVER

## 2023-12-12 ASSESSMENT — PAIN DESCRIPTION - DESCRIPTORS
DESCRIPTORS: ACHING;CRAMPING
DESCRIPTORS: ACHING

## 2023-12-12 ASSESSMENT — PAIN DESCRIPTION - LOCATION: LOCATION: LEG

## 2023-12-12 ASSESSMENT — PAIN - FUNCTIONAL ASSESSMENT
PAIN_FUNCTIONAL_ASSESSMENT: 0-10
PAIN_FUNCTIONAL_ASSESSMENT: 0-10

## 2023-12-12 NOTE — ED TRIAGE NOTES
Pt stated that he could not get up and felt weak so he called EMS. Pt has a history of parkinson's disease and had a similar event like this a few months ago.

## 2023-12-12 NOTE — CARE PLAN
The patient's goals for the shift include remain safe with out falls    The clinical goals for the shift include keep patient comfortable    Over the shift, the patient making progress toward the following goals.

## 2023-12-12 NOTE — ED PROVIDER NOTES
History/Exam limitations: none.   Additional history was obtained from patient and relative(s).    HPI:    Shashi Gutierrez is a 78 y.o. male PMH A-fib on Xarelto and carvedilol, essential tremor improvement from parkinsonism, chronic diastolic heart failure, hyperlipidemia, prostate cancer presenting with generalized weakness x 2 to 3 days.  States it is gradually worsened to the point where he is unable to ambulate on his own.  No headache, vision changes, chest pain, shortness of breath, cough, abdominal pain, diarrhea, dysuria.  Denies any other complaints.  States he feels similar to when he was admitted for UTI in the past.         Physical Exam:  ED Triage Vitals [12/11/23 2228]   Temp Heart Rate Resp BP   37.9 °C (100.3 °F) 93 16 155/84      SpO2 Temp Source Heart Rate Source Patient Position   -- Oral Monitor Sitting      BP Location FiO2 (%)     Left arm --        GEN:      Alert, fatigued appearing  Eyes:       PERRL, EOMI  HENT:      NC/AT, OP clear, airway patent, MM  CV:      RRR, no MRG, no LE pitting edema, 2+ radial and pedal pulses  PULM:     CTAB, no w/r/r, easy WOB, symmetric chest rise  ABD:      Soft, NT, ND, no masses, BS +  :       No CVA TTP  NEURO:   A/Ox4, CN II-XII intact, strength 5/5 in all 4 ext, SILT  MSK:      FROM, no joint deformities or swelling, no e/o trauma  SKIN:       Warm and dry  PSYCH:    Appropriate mood and affect         MDM/ED Course:   Shashi Gutierrez is a 78 y.o. male PMH A-fib on Xarelto and carvedilol, essential tremor improvement from parkinsonism, chronic diastolic heart failure, hyperlipidemia, prostate cancer presenting with generalized weakness x 2 to 3 days.  Signs remarkable for temp 100.3, blood pressure stable.  Exam as documented above.  Patient has generalized weakness, concerning for underlying factious process versus ACS versus symptomatic anemia versus other metabolic process.  Patient has no focal weakness, low suspicion for CVA.  EKG as below.  IV  placed and labs drawn.  Labs reviewed and CBC with no leukocytosis or significant anemia.  Chemistry overall reassuring.  Troponin negative.  TSH low with normal free thyroxine.  Urinalysis reassuring.  Patient found to be COVID-positive.  Influenza negative.  Patient given p.o. Tylenol and 500 cc IV fluids.  Chest x-ray obtained and displays cardiomegaly with mild pulmonary vascular congestion, no other acute findings.  Patient remained hemodynamically stable.  Given profound fatigue, patient does not feel like he can function at home.  Given this, patient hospitalized for continued management.  Patient care signed out to observation provider for continued management stable condition.    EKG reviewed by me: 11:11 PM normal sinus rhythm, rate 95, left axis, normal intervals, T wave inversion in lead III and aVF consistent with prior EKG from September 2023.     Diagnoses as of 12/15/23 1246   Generalized weakness   COVID-19   Inability to walk         Chronic medical conditions affecting care listed in MDM. I independently reviewed imaging studies and agreed with radiology reads. I reviewed recent and relevant outside records including PCP notes, Prior discharge summaries, and prior radiology reports.    Procedure  Procedures    Diagnosis:   1.  Generalized fatigue  2.  Inability to ambulate  3.  COVID-19    Dispo: Hospitalized in stable condition      Disclaimer: Portions of this note were dictated by speech recognition. An attempt at proof reading was made to minimize errors. Minor errors in transcription may be present.  Please call if questions.     Raghu Gonzalez MD  12/15/23 1246

## 2023-12-12 NOTE — H&P
History Of Present Illness  Shashi Gutierrez is a 78 y.o. male with PMH of atrial fibrillation (on xarelto), HFpEF, HLD, prostate ca, Parkinson's disease, CKD 3a, mild intermittent asthma, presenting with weakness. Patient is poor historian and oriented x 2. He tells me that he was been weak the last 2-3 days, unable to get out of bed or ambulate.  He feels similar to when he did in October, when he was admitted for weakness, found to have a UTI, and discharged to rehab facility.  He stayed there for 2 weeks, then was discharged home where he lives with his son.  He does endorse a nonproductive cough that started about a month ago.  Endorses chills but no fevers.  He denies shortness of breath, wheezing, chest pain, nausea/vomiting or diarrhea.  His son was sick with an unspecified illness about a week ago.  In ED, he was found to be positive for COVID-19.  His chest xray showed cardiomegaly with mild pulmonary vascular congestion.  , mildy increased from previous values in 40s. UA was negative for UTI. He was admitted for observation and safe discharge planning.      Past Medical History  Past Medical History:   Diagnosis Date    Encounter for general adult medical examination without abnormal findings 03/04/2019    Encounter for annual health examination    Encounter for general adult medical examination without abnormal findings 01/03/2021    Encounter for annual health examination    Encounter for screening for malignant neoplasm of prostate     Encounter for prostate cancer screening       Surgical History  Past Surgical History:   Procedure Laterality Date    CT ANGIO CORONARY ART WITH HEARTFLOW IF SCORE >30%  8/27/2018    CT HEART CORONARY ANGIOGRAM 8/27/2018 [facility] EMERGENCY [facility]GAC[facility]        Social History  He reports that he quit smoking about 45 years ago. His smoking use included cigars. He has never used smokeless tobacco. He reports that he does not currently use alcohol. He reports that he does not use  drugs.    Family History  Family History   Problem Relation Name Age of Onset    No Known Problems Other          Allergies  Aspirin, Carvedilol, and Lisinopril    Review of Systems   Constitutional:  Positive for fatigue.   HENT: Negative.     Eyes: Negative.    Respiratory:  Positive for cough.    Cardiovascular:  Positive for leg swelling.   Gastrointestinal: Negative.    Genitourinary: Negative.    Musculoskeletal: Negative.    Skin: Negative.    Neurological:  Positive for weakness.   Hematological: Negative.    Psychiatric/Behavioral: Negative.          Physical Exam  Vitals reviewed.   Constitutional:       General: He is not in acute distress.     Appearance: Normal appearance. He is not ill-appearing, toxic-appearing or diaphoretic.   HENT:      Head: Normocephalic.      Nose: Nose normal.      Mouth/Throat:      Mouth: Mucous membranes are moist.      Pharynx: Oropharynx is clear.   Eyes:      General: Lids are normal. Gaze aligned appropriately. No scleral icterus.     Extraocular Movements: Extraocular movements intact.      Conjunctiva/sclera: Conjunctivae normal.      Pupils: Pupils are equal, round, and reactive to light.   Cardiovascular:      Rate and Rhythm: Normal rate and regular rhythm.      Pulses: Normal pulses.      Heart sounds: Normal heart sounds. No murmur heard.  Pulmonary:      Effort: Pulmonary effort is normal.      Breath sounds: Normal air entry. Examination of the right-lower field reveals rales. Examination of the left-lower field reveals rales. Rales present.   Abdominal:      General: Abdomen is flat. Bowel sounds are normal.      Palpations: Abdomen is soft.      Tenderness: There is no abdominal tenderness.   Musculoskeletal:         General: Normal range of motion.      Cervical back: Full passive range of motion without pain and normal range of motion.      Right lower le+ Pitting Edema present.      Left lower le+ Pitting Edema present.   Skin:     General: Skin is  "warm and dry.      Capillary Refill: Capillary refill takes less than 2 seconds.   Neurological:      General: No focal deficit present.      Mental Status: He is alert and oriented to person, place, and time.      Motor: Motor function is intact.      Coordination: Coordination is intact.   Psychiatric:         Attention and Perception: Attention normal.         Mood and Affect: Mood normal.         Speech: Speech normal.         Behavior: Behavior normal. Behavior is cooperative.          Last Recorded Vitals  Blood pressure 128/72, pulse (!) 150, temperature 38.4 °C (101.1 °F), temperature source Temporal, resp. rate 20, height 1.778 m (5' 10\"), weight 89.8 kg (198 lb), SpO2 91 %.    Relevant Results      Scheduled medications  atorvastatin, 20 mg, oral, Daily  carbidopa-levodopa, 1 tablet, oral, TID  docusate sodium, 100 mg, oral, Daily  furosemide, 20 mg, oral, Daily  losartan, 50 mg, oral, Daily  melatonin, 3 mg, oral, Daily  pantoprazole, 40 mg, oral, Daily before breakfast   Or  pantoprazole, 40 mg, intravenous, Daily before breakfast  rivaroxaban, 20 mg, oral, Daily with evening meal  sennosides, 1 tablet, oral, BID  spironolactone, 25 mg, oral, Daily      Continuous medications     PRN medications  PRN medications: acetaminophen, albuterol, fluticasone, loratadine    Results for orders placed or performed during the hospital encounter of 12/11/23 (from the past 24 hour(s))   CBC and Auto Differential   Result Value Ref Range    WBC 5.5 4.4 - 11.3 x10*3/uL    nRBC 0.0 0.0 - 0.0 /100 WBCs    RBC 4.57 4.50 - 5.90 x10*6/uL    Hemoglobin 12.4 (L) 13.5 - 17.5 g/dL    Hematocrit 39.5 (L) 41.0 - 52.0 %    MCV 86 80 - 100 fL    MCH 27.1 26.0 - 34.0 pg    MCHC 31.4 (L) 32.0 - 36.0 g/dL    RDW 12.9 11.5 - 14.5 %    Platelets 218 150 - 450 x10*3/uL    Neutrophils % 74.9 40.0 - 80.0 %    Immature Granulocytes %, Automated 0.5 0.0 - 0.9 %    Lymphocytes % 8.3 13.0 - 44.0 %    Monocytes % 15.4 2.0 - 10.0 %    Eosinophils " % 0.5 0.0 - 6.0 %    Basophils % 0.4 0.0 - 2.0 %    Neutrophils Absolute 4.13 1.60 - 5.50 x10*3/uL    Immature Granulocytes Absolute, Automated 0.03 0.00 - 0.50 x10*3/uL    Lymphocytes Absolute 0.46 (L) 0.80 - 3.00 x10*3/uL    Monocytes Absolute 0.85 (H) 0.05 - 0.80 x10*3/uL    Eosinophils Absolute 0.03 0.00 - 0.40 x10*3/uL    Basophils Absolute 0.02 0.00 - 0.10 x10*3/uL   Magnesium   Result Value Ref Range    Magnesium 2.10 1.60 - 2.40 mg/dL   Comprehensive metabolic panel   Result Value Ref Range    Glucose 84 74 - 99 mg/dL    Sodium 136 136 - 145 mmol/L    Potassium 3.9 3.5 - 5.3 mmol/L    Chloride 98 98 - 107 mmol/L    Bicarbonate 29 21 - 32 mmol/L    Anion Gap 13 10 - 20 mmol/L    Urea Nitrogen 14 6 - 23 mg/dL    Creatinine 1.28 0.50 - 1.30 mg/dL    eGFR 58 (L) >60 mL/min/1.73m*2    Calcium 9.8 8.6 - 10.3 mg/dL    Albumin 4.2 3.4 - 5.0 g/dL    Alkaline Phosphatase 49 33 - 136 U/L    Total Protein 8.1 6.4 - 8.2 g/dL    AST 26 9 - 39 U/L    Bilirubin, Total 0.9 0.0 - 1.2 mg/dL    ALT 8 (L) 10 - 52 U/L   Lipase   Result Value Ref Range    Lipase 23 9 - 82 U/L   Protime-INR   Result Value Ref Range    Protime 25.3 (H) 9.8 - 12.8 seconds    INR 2.2 (H) 0.9 - 1.1   B-Type Natriuretic Peptide   Result Value Ref Range     (H) 0 - 99 pg/mL   Troponin I, High Sensitivity   Result Value Ref Range    Troponin I, High Sensitivity 12 0 - 20 ng/L   TSH with reflex to Free T4 if abnormal   Result Value Ref Range    Thyroid Stimulating Hormone 0.35 (L) 0.44 - 3.98 mIU/L   Thyroxine, Free   Result Value Ref Range    Thyroxine, Free 0.67 0.61 - 1.12 ng/dL   Sars-CoV-2 and Influenza A/B PCR   Result Value Ref Range    Flu A Result Not Detected Not Detected    Flu B Result Not Detected Not Detected    Coronavirus 2019, PCR Detected (A) Not Detected   RSV PCR   Result Value Ref Range    RSV PCR Not Detected Not Detected   Urinalysis with Reflex Microscopic and Culture   Result Value Ref Range    Color, Urine Yellow Straw,  Yellow    Appearance, Urine Clear Clear    Specific Gravity, Urine 1.019 1.005 - 1.035    pH, Urine 7.0 5.0, 5.5, 6.0, 6.5, 7.0, 7.5, 8.0    Protein, Urine 30 (1+) (N) NEGATIVE mg/dL    Glucose, Urine NEGATIVE NEGATIVE mg/dL    Blood, Urine NEGATIVE NEGATIVE    Ketones, Urine NEGATIVE NEGATIVE mg/dL    Bilirubin, Urine NEGATIVE NEGATIVE    Urobilinogen, Urine 4.0 (N) <2.0 mg/dL    Nitrite, Urine NEGATIVE NEGATIVE    Leukocyte Esterase, Urine NEGATIVE NEGATIVE   Urinalysis Microscopic   Result Value Ref Range    WBC, Urine 1-5 1-5, NONE /HPF    RBC, Urine 1-2 NONE, 1-2, 3-5 /HPF    Squamous Epithelial Cells, Urine 1-9 (SPARSE) Reference range not established. /HPF   CBC   Result Value Ref Range    WBC 4.3 (L) 4.4 - 11.3 x10*3/uL    nRBC 0.0 0.0 - 0.0 /100 WBCs    RBC 4.15 (L) 4.50 - 5.90 x10*6/uL    Hemoglobin 11.3 (L) 13.5 - 17.5 g/dL    Hematocrit 39.0 (L) 41.0 - 52.0 %    MCV 94 80 - 100 fL    MCH 27.2 26.0 - 34.0 pg    MCHC 29.0 (L) 32.0 - 36.0 g/dL    RDW 13.1 11.5 - 14.5 %    Platelets 169 150 - 450 x10*3/uL   Basic metabolic panel   Result Value Ref Range    Glucose 93 74 - 99 mg/dL    Sodium 135 (L) 136 - 145 mmol/L    Potassium 4.1 3.5 - 5.3 mmol/L    Chloride 101 98 - 107 mmol/L    Bicarbonate 26 21 - 32 mmol/L    Anion Gap 12 10 - 20 mmol/L    Urea Nitrogen 15 6 - 23 mg/dL    Creatinine 1.12 0.50 - 1.30 mg/dL    eGFR 68 >60 mL/min/1.73m*2    Calcium 9.0 8.6 - 10.3 mg/dL   D-dimer, Non VTE   Result Value Ref Range    D-Dimer Non VTE, Quant (ng/mL FEU) <215 <=500 ng/mL FEU   Lactate   Result Value Ref Range    Lactate 0.7 0.4 - 2.0 mmol/L   Light Blue Top   Result Value Ref Range    Extra Tube Hold for add-ons.    Green Top   Result Value Ref Range    Extra Tube Hold for add-ons.    Lavender Top   Result Value Ref Range    Extra Tube Hold for add-ons.    POCT GLUCOSE   Result Value Ref Range    POCT Glucose 101 (H) 74 - 99 mg/dL     XR chest 1 view    Result Date: 12/12/2023  STUDY: Chest Radiograph;   12/11/2023 11:08 PM INDICATION: Fatigue.  COMPARISON: XR chest 09/28/2023.  ACCESSION NUMBER(S): [id number] ORDERING CLINICIAN: RUBÉN PRESCOTT TECHNIQUE:  Frontal chest was obtained at 2308 hours. FINDINGS: CARDIOMEDIASTINAL SILHOUETTE: Cardiomediastinal silhouette is enlarged in size and configuration.  LUNGS: Minimally increased interstitial markings are noted bilaterally.  No focal airspace consolidation is identified.  ABDOMEN: No remarkable upper abdominal findings.  BONES: No acute osseous changes.    Cardiomegaly with mild pulmonary vascular congestion. Signed by Justin Klein        Assessment/Plan   Principal Problem:    Generalized weakness    Shashi Gutierrez is a 78 y.o. male with PMH of atrial fibrillation (on xarelto), HFpEF, HLD, prostate ca, Parkinson's disease, CKD 3a, mild intermittent asthma, presenting with weakness. Patient is poor historian and oriented x 2. He tells me that he was been weak the last 2-3 days, unable to get out of bed or ambulate.  He feels similar to when he did in October, when he was admitted for weakness, found to have a UTI, and discharged to rehab facility.  He stayed there for 2 weeks, then was discharged home where he lives with his son.  He does endorse a nonproductive cough that started about a month ago.  Endorses chills but no fevers.  He denies shortness of breath, wheezing, chest pain, nausea/vomiting or diarrhea.  His son was sick with an unspecified illness about a week ago.  In ED, he was found to be positive for COVID-19.  His chest xray showed cardiomegaly with mild pulmonary vascular congestion.  , mildy increased from previous values in 40s. UA was negative for UTI. He is admitted for further observation and safe discharge planning.     Physical deconditioning  COVID-19  - weakness likely related to COVID-19 infection; he is otherwise asymptomatic and without respiratory symptoms or hypoxia   - supportive care; remdesivir/decadron not indicated given  lack of symptoms or risk factors for severe illness  - PT/OT   - D Dimer non VTE normal at <215, already on therapeutic AC on xarelto (patient reports compliance)    Atrial fibrillation   - transient HR documented at 150-- EKG completed and shows NSR with rates in 80s  - continue telemetry monitoring   - continue xarelto    HFpEF   - evidence of mild volume overload   - continue lasix and spironolactone     HLD   - continue statin     Prostate cancer  - nil acute. Monitor for urinary retention     Parkinson's disease   - continue carbidopa-levodopa     CKD 3a  - creatinine consistent with baseline   - avoid nephrotoxic agents     Mild intermittent asthma   - not in exacerbation   - continue albuterol PRN     VTE/GI Prophylaxis   - therapeutic AC on xarelto   - bowel regimen in place     Discharge Planning   - PT/OT ordered     I spent 60 minutes in the professional and overall care of this patient.      Kim Anderson, ASHLEY-CNP

## 2023-12-12 NOTE — NURSING NOTE
The following patient has a RADAR score of 7. Unit: UA1, Room: [address] [place] [address]-A, T: 38.4 °C (101.1 °F) (Temporal), P: (!) 150, R: 20, BP: 128/72, PulseOx: 91%    Radar alert received above. Attempt to call bedside RN, but call did not go through.  Advised charge RN that pt met qualifications for Sepsis Alert. Charge RN to check in with bedside RN and provider about Sepsis criteria.

## 2023-12-12 NOTE — NURSING NOTE
Received call from rapid response nurse about possible sepsis alert in Obs 2 - spoke with bedside RN and sent message to them and provider Kim Anderson CNP who requested EKG and is en route to assess pt

## 2023-12-13 LAB
ANION GAP SERPL CALC-SCNC: 13 MMOL/L (ref 10–20)
BUN SERPL-MCNC: 18 MG/DL (ref 6–23)
CALCIUM SERPL-MCNC: 9 MG/DL (ref 8.6–10.3)
CHLORIDE SERPL-SCNC: 100 MMOL/L (ref 98–107)
CO2 SERPL-SCNC: 26 MMOL/L (ref 21–32)
CREAT SERPL-MCNC: 1.06 MG/DL (ref 0.5–1.3)
ERYTHROCYTE [DISTWIDTH] IN BLOOD BY AUTOMATED COUNT: 12.9 % (ref 11.5–14.5)
GFR SERPL CREATININE-BSD FRML MDRD: 72 ML/MIN/1.73M*2
GLUCOSE SERPL-MCNC: 84 MG/DL (ref 74–99)
HCT VFR BLD AUTO: 34.8 % (ref 41–52)
HGB BLD-MCNC: 10.9 G/DL (ref 13.5–17.5)
MCH RBC QN AUTO: 27.5 PG (ref 26–34)
MCHC RBC AUTO-ENTMCNC: 31.3 G/DL (ref 32–36)
MCV RBC AUTO: 88 FL (ref 80–100)
NRBC BLD-RTO: 0 /100 WBCS (ref 0–0)
PLATELET # BLD AUTO: 195 X10*3/UL (ref 150–450)
POTASSIUM SERPL-SCNC: 3.6 MMOL/L (ref 3.5–5.3)
RBC # BLD AUTO: 3.96 X10*6/UL (ref 4.5–5.9)
SODIUM SERPL-SCNC: 135 MMOL/L (ref 136–145)
WBC # BLD AUTO: 4.1 X10*3/UL (ref 4.4–11.3)

## 2023-12-13 PROCEDURE — 2500000004 HC RX 250 GENERAL PHARMACY W/ HCPCS (ALT 636 FOR OP/ED): Performed by: NURSE PRACTITIONER

## 2023-12-13 PROCEDURE — 2500000001 HC RX 250 WO HCPCS SELF ADMINISTERED DRUGS (ALT 637 FOR MEDICARE OP): Performed by: NURSE PRACTITIONER

## 2023-12-13 PROCEDURE — 36415 COLL VENOUS BLD VENIPUNCTURE: CPT | Performed by: NURSE PRACTITIONER

## 2023-12-13 PROCEDURE — 97161 PT EVAL LOW COMPLEX 20 MIN: CPT | Mod: GP

## 2023-12-13 PROCEDURE — 80048 BASIC METABOLIC PNL TOTAL CA: CPT | Performed by: NURSE PRACTITIONER

## 2023-12-13 PROCEDURE — 99233 SBSQ HOSP IP/OBS HIGH 50: CPT | Performed by: NURSE PRACTITIONER

## 2023-12-13 PROCEDURE — G0378 HOSPITAL OBSERVATION PER HR: HCPCS

## 2023-12-13 PROCEDURE — 2500000001 HC RX 250 WO HCPCS SELF ADMINISTERED DRUGS (ALT 637 FOR MEDICARE OP): Performed by: PHYSICIAN ASSISTANT

## 2023-12-13 PROCEDURE — 97166 OT EVAL MOD COMPLEX 45 MIN: CPT | Mod: GO

## 2023-12-13 PROCEDURE — 2500000004 HC RX 250 GENERAL PHARMACY W/ HCPCS (ALT 636 FOR OP/ED): Performed by: PHYSICIAN ASSISTANT

## 2023-12-13 PROCEDURE — 85027 COMPLETE CBC AUTOMATED: CPT | Performed by: NURSE PRACTITIONER

## 2023-12-13 RX ADMIN — CARBIDOPA AND LEVODOPA 1 TABLET: 25; 100 TABLET ORAL at 09:13

## 2023-12-13 RX ADMIN — ATORVASTATIN CALCIUM 20 MG: 20 TABLET, FILM COATED ORAL at 09:12

## 2023-12-13 RX ADMIN — Medication 3 MG: at 20:04

## 2023-12-13 RX ADMIN — CARBIDOPA AND LEVODOPA 1 TABLET: 25; 100 TABLET ORAL at 20:04

## 2023-12-13 RX ADMIN — LOSARTAN POTASSIUM 50 MG: 50 TABLET, FILM COATED ORAL at 09:13

## 2023-12-13 RX ADMIN — SENNOSIDES 8.6 MG: 8.6 TABLET, FILM COATED ORAL at 09:13

## 2023-12-13 RX ADMIN — CARBIDOPA AND LEVODOPA 1 TABLET: 25; 100 TABLET ORAL at 15:45

## 2023-12-13 RX ADMIN — SENNOSIDES 8.6 MG: 8.6 TABLET, FILM COATED ORAL at 20:04

## 2023-12-13 RX ADMIN — FUROSEMIDE 20 MG: 20 TABLET ORAL at 09:12

## 2023-12-13 RX ADMIN — DOCUSATE SODIUM 100 MG: 100 CAPSULE, LIQUID FILLED ORAL at 09:13

## 2023-12-13 RX ADMIN — SPIRONOLACTONE 25 MG: 25 TABLET ORAL at 09:13

## 2023-12-13 RX ADMIN — RIVAROXABAN 20 MG: 20 TABLET, FILM COATED ORAL at 16:52

## 2023-12-13 ASSESSMENT — COGNITIVE AND FUNCTIONAL STATUS - GENERAL
WALKING IN HOSPITAL ROOM: TOTAL
WALKING IN HOSPITAL ROOM: A LOT
EATING MEALS: A LITTLE
MOVING FROM LYING ON BACK TO SITTING ON SIDE OF FLAT BED WITH BEDRAILS: A LITTLE
CLIMB 3 TO 5 STEPS WITH RAILING: TOTAL
TURNING FROM BACK TO SIDE WHILE IN FLAT BAD: A LITTLE
HELP NEEDED FOR BATHING: A LOT
WALKING IN HOSPITAL ROOM: TOTAL
CLIMB 3 TO 5 STEPS WITH RAILING: TOTAL
MOBILITY SCORE: 12
CLIMB 3 TO 5 STEPS WITH RAILING: TOTAL
MOVING FROM LYING ON BACK TO SITTING ON SIDE OF FLAT BED WITH BEDRAILS: A LITTLE
STANDING UP FROM CHAIR USING ARMS: A LOT
TURNING FROM BACK TO SIDE WHILE IN FLAT BAD: A LITTLE
STANDING UP FROM CHAIR USING ARMS: A LOT
MOVING TO AND FROM BED TO CHAIR: A LOT
MOVING TO AND FROM BED TO CHAIR: A LOT
MOBILITY SCORE: 12
DAILY ACTIVITIY SCORE: 12
STANDING UP FROM CHAIR USING ARMS: A LOT
TOILETING: TOTAL
MOVING TO AND FROM BED TO CHAIR: A LOT
PERSONAL GROOMING: A LITTLE
DRESSING REGULAR UPPER BODY CLOTHING: A LOT
MOBILITY SCORE: 12
TURNING FROM BACK TO SIDE WHILE IN FLAT BAD: A LOT
MOVING FROM LYING ON BACK TO SITTING ON SIDE OF FLAT BED WITH BEDRAILS: A LITTLE
DRESSING REGULAR LOWER BODY CLOTHING: TOTAL

## 2023-12-13 ASSESSMENT — PAIN - FUNCTIONAL ASSESSMENT
PAIN_FUNCTIONAL_ASSESSMENT: 0-10

## 2023-12-13 ASSESSMENT — PAIN SCALES - GENERAL
PAINLEVEL_OUTOF10: 0 - NO PAIN
PAINLEVEL_OUTOF10: 4
PAINLEVEL_OUTOF10: 0 - NO PAIN

## 2023-12-13 ASSESSMENT — ACTIVITIES OF DAILY LIVING (ADL)
BATHING_ASSISTANCE: MODERATE
ADL_ASSISTANCE: INDEPENDENT
ADL_ASSISTANCE: INDEPENDENT

## 2023-12-13 ASSESSMENT — PAIN DESCRIPTION - DESCRIPTORS: DESCRIPTORS: ACHING

## 2023-12-13 NOTE — CARE PLAN
The patient's goals for the shift include remain safe with out falls    The clinical goals for the shift include to get HS bath    Over the shift, the patient did not make progress toward the following goals.

## 2023-12-13 NOTE — CARE PLAN
The patient's goals for the shift include remain safe with out falls    The clinical goals for the shift include falls free    Over the shift, the patient did not make progress toward the following goals. Barriers to progression include weakness. Recommendations to address these barriers include increase monitoring.

## 2023-12-13 NOTE — PROGRESS NOTES
Occupational Therapy    Evaluation    Patient Name: Shashi Gutierrez  MRN: [id number]  Today's Date: 12/13/2023  Time Calculation  Start Time: 1317  Stop Time: 1335  Time Calculation (min): 18 min    Assessment  IP OT Assessment  OT Assessment: Pt see for OT eval. Pt demonstrates with decreased endurance Decreased shld strength,decreased B UE coordination assist with mobility and ADLS  Prognosis: Good  Barriers to Discharge: None  Evaluation/Treatment Tolerance: Patient limited by fatigue  Medical Staff Made Aware: Yes  End of Session Communication: Bedside nurse, PCT/NA/CTA  End of Session Patient Position: Bed, 3 rail up  Plan:  Treatment Interventions: ADL retraining, UE strengthening/ROM, Functional transfer training, Endurance training, Cognitive reorientation  OT Frequency: 3 times per week  OT Discharge Recommendations: Moderate intensity level of continued care  Equipment Recommended upon Discharge: Wheeled walker  OT - OK to Discharge: Yes    Subjective   Current Problem:  No diagnosis found.  General:  General  Reason for Referral: Gen Weakness Covid  Referred By: JESS Anderson CNP  Past Medical History Relevant to Rehab: Afib, HFpEF HLD, prostate CA, parkinson's, CKD 3a, asthma  Co-Treatment: PT  Co-Treatment Reason: co eval with PT to maximize pt mobility participation and safety  Prior to Session Communication: Bedside nurse  Patient Position Received: Bed, 3 rail up, Alarm on  Preferred Learning Style: auditory, verbal, visual  General Comment: Pt agreeable to therapy  Precautions:  Medical Precautions: Fall precautions, Infection precautions  Vital Signs:     Pain:  Pain Assessment  Pain Assessment: 0-10  Pain Score: 0 - No pain    Objective   Cognition:  Overall Cognitive Status: Within Functional Limits, Impaired  Orientation Level: Oriented X4  Following Commands: Follows all commands and directions without difficulty  Memory: Exceptions to WFL (mild decrease)  Short-Term Memory: Impaired (mild  decrease)  Memory Comments: inconsistent STM           Home Living:  Type of Home: House  Lives With: Adult children  Home Adaptive Equipment: Walker rolling or standard  Home Layout: One level  Home Access: Stairs to enter with rails  Entrance Stairs-Rails:  (4 steps with R rail landing and 7 steps L rail)  Entrance Stairs-Number of Steps:  (4 steps to landing then 7 steps)  Bathroom Shower/Tub: Tub/shower unit  Bathroom Toilet: Adaptive toilet seating  Bathroom Equipment: None  Home Living Comments: Pt lives with son   Prior Function:  Level of [place]: Independent with ADLs and functional transfers  Receives Help From: Family  ADL Assistance: Independent  Homemaking Assistance: Needs assistance  Homemaking Assistance Comments: Son completes homemaking tasks. pt recieves meals on wheels  Ambulatory Assistance: Independent (wheelwd walker)  Hand Dominance: Right  IADL History:  Homemaking Responsibilities: No (Son completes IADLS)  ADL:  Eating Deficit: Setup  Grooming Assistance: Stand by  Bathing Assistance: Moderate  UE Dressing Assistance: Moderate  LE Dressing Assistance: Maximal  Toileting Assistance with Device: Maximal  Activity Tolerance:  Endurance: Tolerates 10 - 20 min exercise with multiple rests  Activity Tolerance Comments: Fatigues with activity  Bed Mobility/Transfers: Bed Mobility  Bed Mobility: Yes  Bed Mobility 1  Bed Mobility 1: Supine to sitting, Sitting to supine  Level of Assistance 1: Moderate assistance, Moderate verbal cues, Moderate tactile cues  Bed Mobility Comments 1: mod A x2    Transfers  Transfer: Yes  Transfer 1  Technique 1: Sit to stand, Stand to sit  Transfer Level of Assistance 1: Moderate assistance, +2, Moderate verbal cues, Moderate tactile cues     IADL's:   Homemaking Responsibilities: No (Son completes IADLS)  Vision: Vision - Basic Assessment  Current Vision: No visual deficits  Strength:  Strength Comments: B Shld 4/5 B distally 5/5  Perception:      Coordination:  Coordination Comment: resting tremor noted R UE, decreased rate and accuracy GM/FM coordination   Hand Function:  Hand Function  Gross Grasp: Functional  Coordination: Impaired  Extremities: RUE   RUE : Within Functional Limits and LUE   LUE: Within Functional Limits    Outcome Measures: [facility] Daily Activity  Putting on and taking off regular lower body clothing: Total  Bathing (including washing, rinsing, drying): A lot  Putting on and taking off regular upper body clothing: A lot  Toileting, which includes using toilet, bedpan or urinal: Total  Taking care of personal grooming such as brushing teeth: A little  Eating Meals: A little  Daily Activity - Total Score: 12      Education Documentation  Handouts, taught by Amee Dunbar OT at 12/13/2023  2:22 PM.  Learner: Patient  Readiness: Acceptance  Method: Explanation  Response: Verbalizes Understanding, Needs Reinforcement    Home Exercise Program, taught by Amee Dunbar OT at 12/13/2023  2:22 PM.  Learner: Patient  Readiness: Acceptance  Method: Explanation  Response: Verbalizes Understanding, Needs Reinforcement    ADL Training, taught by Amee Dunbar OT at 12/13/2023  2:22 PM.  Learner: Patient  Readiness: Acceptance  Method: Explanation  Response: Verbalizes Understanding, Needs Reinforcement    Education Comments  No comments found.      Goals:   Encounter Problems       Encounter Problems (Active)       ADLs       Patient will perform UB and LB bathing 50% with minimal assist  level of assistance and adaptive equipment prn . (Progressing)       Start:  12/13/23    Expected End:  12/27/23            Patient with complete upper body dressing with minimal assist  level of assistance donning and doffing all UE clothes with PRN adaptive equipment while supported sitting (Progressing)       Start:  12/13/23    Expected End:  12/27/23               COGNITION/SAFETY       Pt will recall events of tx session 100% with cues prn   (Progressing)       Start:  12/13/23    Expected End:  12/27/23               TRANSFERS       Patient will perform bed mobility moderate assist level of assistance and bed rails and draw sheet in order to improve safety and independence with mobility (Progressing)       Start:  12/13/23    Expected End:  12/27/23

## 2023-12-13 NOTE — PROGRESS NOTES
Shashi Gutierrez is a 78 y.o. male on day 0 of admission presenting with Generalized weakness.      Subjective   Patient seen and examined  Awake in bed, in nad  Denies any pain, sob, cough, stable on room air  Complains of constipation        Objective     Last Recorded Vitals  /74 (BP Location: Left arm, Patient Position: Lying)   Pulse 67   Temp 37.2 °C (99 °F) (Temporal)   Resp 16   Wt 89.8 kg (198 lb)   SpO2 95%   Intake/Output last 3 Shifts:    Intake/Output Summary (Last 24 hours) at 12/13/2023 1106  Last data filed at 12/13/2023 0000  Gross per 24 hour   Intake --   Output 1000 ml   Net -1000 ml       Admission Weight  Weight: 88.9 kg (196 lb) (12/11/23 2228)    Daily Weight  12/12/23 : 89.8 kg (198 lb)    Image Results  XR chest 1 view  Narrative: STUDY:  Chest Radiograph;  12/11/2023 11:08 PM   INDICATION:  Fatigue.    COMPARISON:  XR chest 09/28/2023.    ACCESSION NUMBER(S):  [id number]  ORDERING CLINICIAN:  RUBÉN PRESCOTT  TECHNIQUE:  Frontal chest was obtained at 2308 hours.  FINDINGS:  CARDIOMEDIASTINAL SILHOUETTE:  Cardiomediastinal silhouette is enlarged in size and configuration.     LUNGS:  Minimally increased interstitial markings are noted bilaterally.  No  focal airspace consolidation is identified.     ABDOMEN:  No remarkable upper abdominal findings.     BONES:  No acute osseous changes.  Impression: Cardiomegaly with mild pulmonary vascular congestion.  Signed by Justin Klein      Physical Exam  Vitals reviewed.   Constitutional:       General: He is not in acute distress.     Appearance: Normal appearance. He is not ill-appearing, toxic-appearing or diaphoretic.   HENT:      Head: Normocephalic.      Nose: Nose normal.      Mouth/Throat:      Mouth: Mucous membranes are moist.      Pharynx: Oropharynx is clear.   Eyes:      General: Lids are normal. Gaze aligned appropriately. No scleral icterus.     Extraocular Movements: Extraocular movements intact.      Conjunctiva/sclera:  Conjunctivae normal.      Pupils: Pupils are equal, round, and reactive to light.   Cardiovascular:      Rate and Rhythm: Normal rate and regular rhythm.      Pulses: Normal pulses.      Heart sounds: Normal heart sounds. No murmur heard.  Pulmonary:      Effort: Pulmonary effort is normal.      Breath sounds: Normal air entry. Examination of the right-lower field reveals rales. Examination of the left-lower field reveals rales. Rales present.   Abdominal:      General: Abdomen is flat. Bowel sounds are normal.      Palpations: Abdomen is soft.      Tenderness: There is no abdominal tenderness.   Musculoskeletal:         General: Normal range of motion.      Cervical back: Full passive range of motion without pain and normal range of motion.      Right lower le+ Pitting Edema present.      Left lower le+ Pitting Edema present.   Skin:     General: Skin is warm and dry.      Capillary Refill: Capillary refill takes less than 2 seconds.   Neurological:      General: No focal deficit present.      Mental Status: He is alert and oriented to person, place, and time.      Motor: Motor function is intact.      Coordination: Coordination is intact.   Psychiatric:         Attention and Perception: Attention normal.         Mood and Affect: Mood normal.         Speech: Speech normal.         Behavior: Behavior normal. Behavior is cooperative.   Relevant Results  Scheduled medications  atorvastatin, 20 mg, oral, Daily  carbidopa-levodopa, 1 tablet, oral, TID  docusate sodium, 100 mg, oral, Daily  furosemide, 20 mg, oral, Daily  losartan, 50 mg, oral, Daily  melatonin, 3 mg, oral, Daily  pantoprazole, 40 mg, oral, Daily before breakfast   Or  pantoprazole, 40 mg, intravenous, Daily before breakfast  rivaroxaban, 20 mg, oral, Daily with evening meal  sennosides, 1 tablet, oral, BID  spironolactone, 25 mg, oral, Daily      Continuous medications     PRN medications  PRN medications: acetaminophen, albuterol, fluticasone,  loratadine        Results for orders placed or performed during the hospital encounter of 12/11/23 (from the past 24 hour(s))   POCT GLUCOSE   Result Value Ref Range    POCT Glucose 101 (H) 74 - 99 mg/dL   POCT GLUCOSE   Result Value Ref Range    POCT Glucose 116 (H) 74 - 99 mg/dL   CBC   Result Value Ref Range    WBC 4.1 (L) 4.4 - 11.3 x10*3/uL    nRBC 0.0 0.0 - 0.0 /100 WBCs    RBC 3.96 (L) 4.50 - 5.90 x10*6/uL    Hemoglobin 10.9 (L) 13.5 - 17.5 g/dL    Hematocrit 34.8 (L) 41.0 - 52.0 %    MCV 88 80 - 100 fL    MCH 27.5 26.0 - 34.0 pg    MCHC 31.3 (L) 32.0 - 36.0 g/dL    RDW 12.9 11.5 - 14.5 %    Platelets 195 150 - 450 x10*3/uL   Basic Metabolic Panel   Result Value Ref Range    Glucose 84 74 - 99 mg/dL    Sodium 135 (L) 136 - 145 mmol/L    Potassium 3.6 3.5 - 5.3 mmol/L    Chloride 100 98 - 107 mmol/L    Bicarbonate 26 21 - 32 mmol/L    Anion Gap 13 10 - 20 mmol/L    Urea Nitrogen 18 6 - 23 mg/dL    Creatinine 1.06 0.50 - 1.30 mg/dL    eGFR 72 >60 mL/min/1.73m*2    Calcium 9.0 8.6 - 10.3 mg/dL       Assessment/Plan         Shashi Gutierrez is a 78 y.o. male with PMH of atrial fibrillation (on xarelto), HFpEF, HLD, prostate ca, Parkinson's disease, CKD 3a, mild intermittent asthma, presenting with weakness. Patient is poor historian and oriented x 2. He tells me that he was been weak the last 2-3 days, unable to get out of bed or ambulate.  He feels similar to when he did in October, when he was admitted for weakness, found to have a UTI, and discharged to rehab facility.  He stayed there for 2 weeks, then was discharged home where he lives with his son.  He does endorse a nonproductive cough that started about a month ago.  Endorses chills but no fevers.  He denies shortness of breath, wheezing, chest pain, nausea/vomiting or diarrhea.  His son was sick with an unspecified illness about a week ago.  In ED, he was found to be positive for COVID-19.  His chest xray showed cardiomegaly with mild pulmonary vascular  congestion.  , mildy increased from previous values in 40s. UA was negative for UTI. He is admitted for further observation and safe discharge planning.     Physical deconditioning  COVID-19  - weakness likely related to COVID-19 infection; he is otherwise asymptomatic and without respiratory symptoms or hypoxia   - supportive care; remdesivir/decadron not indicated given lack of symptoms or risk factors for severe illness  - PT/OT, fall precautions   - D Dimer non VTE normal at <215, already on therapeutic AC on xarelto (patient reports compliance)     Atrial fibrillation   - transient HR documented at 150-- EKG completed and shows NSR with rates in 80s  - continue telemetry monitoring   - continue xarelto     HFpEF   - evidence of mild volume overload on admission, improved   - continue lasix and spironolactone      HLD   - continue statin      Prostate cancer  - nil acute. Monitor for urinary retention      Parkinson's disease   - continue carbidopa-levodopa      CKD 3a  - creatinine consistent with baseline   - avoid nephrotoxic agents      Mild intermittent asthma   - not in exacerbation   - continue albuterol PRN      VTE/GI Prophylaxis   - therapeutic AC on xarelto   - bowel regimen in place      Discharge Planning   - PT/OT ordered     Labs/Testing reviewed    Interdisciplinary team rounding completed with hospitalist, nurse, TCC    NP discussed plan and lab/testing results with Dr. Bergeron    * 35 minutes total spent on patient's care today; >50% time spent on counseling/coordination of care                ASHLEY Mejia-CNP

## 2023-12-13 NOTE — CARE PLAN
Problem: Pain - Adult  Goal: Verbalizes/displays adequate comfort level or baseline comfort level  Outcome: Progressing     Problem: Safety - Adult  Goal: Free from fall injury  Outcome: Progressing     Problem: Discharge Planning  Goal: Discharge to home or other facility with appropriate resources  Outcome: Progressing     Problem: Chronic Conditions and Co-morbidities  Goal: Patient's chronic conditions and co-morbidity symptoms are monitored and maintained or improved  Outcome: Progressing     Problem: Pain  Goal: Turns in bed with improved pain control throughout the shift  Outcome: Progressing  Goal: Free from acute confusion related to pain meds throughout the shift  Outcome: Progressing     Problem: Skin  Goal: Prevent/manage excess moisture  Outcome: Progressing   The patient's goals for the shift include remain safe with out falls    The clinical goals for the shift include keep patient comfortable    Over the shift, the patient did make progress toward the following goals.

## 2023-12-13 NOTE — PROGRESS NOTES
Physical Therapy    Physical Therapy Evaluation    Patient Name: Shashi Gutierrez  MRN: [id number]  Today's Date: 12/13/2023   Time Calculation  Start Time: 1318  Stop Time: 1336  Time Calculation (min): 18 min    Assessment/Plan   PT Assessment  PT Assessment Results: Decreased endurance, Impaired balance, Decreased mobility  Rehab Prognosis: Good  Barriers to Discharge: none  Evaluation/Treatment Tolerance: Patient limited by fatigue  Medical Staff Made Aware: Yes  Strengths: Attitude of self, Premorbid level of function  End of Session Communication: Bedside nurse, PCT/NA/CTA  Assessment Comment: Pt in supine lying at end of PT session with alarm on. Pt would benefit from moderate intensity PT to maximize functional mobility and safety.  End of Session Patient Position: Bed, 3 rail up  IP OR SWING BED PT PLAN  Inpatient or Swing Bed: Inpatient  PT Plan  Treatment/Interventions: Bed mobility, Transfer training, Gait training, Stair training  PT Plan: Skilled PT  PT Frequency: 3 times per week  PT Discharge Recommendations: Moderate intensity level of continued care  Equipment Recommended upon Discharge: Wheeled walker  PT Recommended Transfer Status: Assist x2  PT - OK to Discharge: Yes (Per POC)      Subjective   General Visit Information:  General  Reason for Referral: Gen Weakness Covid  Referred By: JESS Anderson CNP  Past Medical History Relevant to Rehab: Afib, HFpEF HLD, prostate CA, parkinson's, CKD 3a, asthma  Co-Treatment: OT  Co-Treatment Reason: to maximize pt participation and safety  Prior to Session Communication: Bedside nurse  Patient Position Received: Bed, 3 rail up, Alarm on  Preferred Learning Style: auditory, verbal, visual  General Comment: Pt received supine lying, telemetry and purewick in place, no apprent distress. pt pleasant and agreeable to PT. Tremors noted in right UE at rest.  Home Living:  Home Living  Type of Home: House  Lives With: Adult children  Home Adaptive Equipment: Walker rolling or  standard  Home Layout: One level  Home Access: Stairs to enter with rails (Pt states there are 4 stairs to enter with rail on right and then a landing plus another 7 stairs to enter with rail on left. Once inside the home is all one level.)  Bathroom Shower/Tub: Tub/shower unit  Bathroom Toilet: Adaptive toilet seating  Bathroom Equipment: None  Home Living Comments: Pt lives with son.  Prior Level of Function:  Prior Function Per Pt/Caregiver Report  Level of [place]: Independent with ADLs and functional transfers  Receives Help From: Family  ADL Assistance: Independent (Pt states he is independent with ambulation using a FWW, dressing and bathing)  Homemaking Assistance: Needs assistance (son does cooking, cleaning and laundry)  Homemaking Assistance Comments: Son completes homemaking tasks. pt recieves meals on wheels  Ambulatory Assistance: Independent (with FWW)  Hand Dominance: Right  Precautions:  Precautions  Medical Precautions: Fall precautions, Infection precautions  Precautions Comment: COVID positive  Vital Signs:       Objective   Pain:  Pain Assessment  Pain Assessment: 0-10  Pain Score: 0 - No pain  Cognition:  Cognition  Overall Cognitive Status: Impaired  Orientation Level: Disoriented to time (Pt required prompting for date. Pt appropriate and following directions with appropriately)  Following Commands: Follows all commands and directions without difficulty    General Assessments:  General Observation  General Observation: No apparent distress.               Activity Tolerance  Endurance: Tolerates 10 - 20 min exercise with multiple rests  Activity Tolerance Comments: Fatigue noted         Strength  Strength Comments: Bilat LEs grossly 4/5           Coordination  Coordination Comment: resting tremor noted R UE, decreased rate and accuracy GM/FM coordination         Static Sitting Balance  Static Sitting-Balance Support: Feet supported  Static Sitting-Level of Assistance: Close  supervision       Functional Assessments:  Bed Mobility  Bed Mobility: Yes  Bed Mobility 1  Bed Mobility 1: Supine to sitting, Sitting to supine  Level of Assistance 1: Moderate assistance (x2)    Transfers  Transfer: Yes  Transfer 1  Technique 1: Sit to stand, Stand to sit  Transfer Level of Assistance 1: Moderate assistance, +2, Moderate verbal cues, Moderate tactile cues    Ambulation/Gait Training  Ambulation/Gait Training Performed: Yes  Ambulation/Gait Training 1  Surface 1: Level tile  Assistance 1: Moderate assistance (x2)  Comments/Distance (ft) 1: side stepping at EOB x2 feet.       Extremity/Trunk Assessments:  RLE   RLE : Within Functional Limits  LLE   LLE : Within Functional Limits  Outcome Measures:  [facility] Basic Mobility  Turning from your back to your side while in a flat bed without using bedrails: A little  Moving from lying on your back to sitting on the side of a flat bed without using bedrails: A lot  Moving to and from bed to chair (including a wheelchair): A lot  Standing up from a chair using your arms (e.g. wheelchair or bedside chair): A lot  To walk in hospital room: A lot  Climbing 3-5 steps with railing: Total  Basic Mobility - Total Score: 12    Encounter Problems       Encounter Problems (Active)       Mobility       STG - Patient will ambulate 20 feet with FWW and CG       Start:  12/13/23    Expected End:  12/27/23               Pain - Adult          Transfers       STG - Transfer from bed to chair with CG and FWW       Start:  12/13/23    Expected End:  12/27/23            STG - Patient to transfer to and from sit to supine with min assist x1       Start:  12/13/23    Expected End:  12/27/23            STG - Patient will perform bed mobility with CG       Start:  12/13/23    Expected End:  12/27/23            STG - Patient will transfer sit to and from stand with CG and FWW       Start:  12/13/23    Expected End:  12/27/23                   Education Documentation  Handouts, taught  by No Chapa, PT at 12/13/2023  3:24 PM.  Learner: Patient  Readiness: Acceptance  Method: Explanation  Response: Demonstrated Understanding, Needs Reinforcement    Precautions, taught by No Chapa, PT at 12/13/2023  3:24 PM.  Learner: Patient  Readiness: Acceptance  Method: Explanation  Response: Demonstrated Understanding, Needs Reinforcement    Body Mechanics, taught by No Chapa, PT at 12/13/2023  3:24 PM.  Learner: Patient  Readiness: Acceptance  Method: Explanation  Response: Demonstrated Understanding, Needs Reinforcement    Home Exercise Program, taught by No Chapa, PT at 12/13/2023  3:24 PM.  Learner: Patient  Readiness: Acceptance  Method: Explanation  Response: Demonstrated Understanding, Needs Reinforcement    Mobility Training, taught by No Chapa, PT at 12/13/2023  3:24 PM.  Learner: Patient  Readiness: Acceptance  Method: Explanation  Response: Demonstrated Understanding, Needs Reinforcement    Education Comments  No comments found.

## 2023-12-13 NOTE — PROGRESS NOTES
Left vm for son Glenn [phone] to see about choices for SNF.  Patient still waiting to see pt/ot for recommendations.  Let Glenn know that a list of facilities accepting COVID+ patients can be emailed to him.  Family showed interest in [facility] during last admission but no beds were available.  Patient went to [facility].  Not sure if either of those facilities are still preferences for the family.    208 Son, Glenn left vm that [facility] is their FOC.   Asked Amandeep MCNEAL to put in referral in careport.  PT still pending.

## 2023-12-14 VITALS
RESPIRATION RATE: 18 BRPM | TEMPERATURE: 99.7 F | OXYGEN SATURATION: 94 % | HEART RATE: 73 BPM | BODY MASS INDEX: 28.35 KG/M2 | HEIGHT: 70 IN | SYSTOLIC BLOOD PRESSURE: 139 MMHG | DIASTOLIC BLOOD PRESSURE: 86 MMHG | WEIGHT: 198 LBS

## 2023-12-14 LAB
ANION GAP SERPL CALC-SCNC: 12 MMOL/L (ref 10–20)
BUN SERPL-MCNC: 19 MG/DL (ref 6–23)
CALCIUM SERPL-MCNC: 9 MG/DL (ref 8.6–10.3)
CHLORIDE SERPL-SCNC: 99 MMOL/L (ref 98–107)
CO2 SERPL-SCNC: 29 MMOL/L (ref 21–32)
CREAT SERPL-MCNC: 1.06 MG/DL (ref 0.5–1.3)
ERYTHROCYTE [DISTWIDTH] IN BLOOD BY AUTOMATED COUNT: 13 % (ref 11.5–14.5)
GFR SERPL CREATININE-BSD FRML MDRD: 72 ML/MIN/1.73M*2
GLUCOSE SERPL-MCNC: 72 MG/DL (ref 74–99)
HCT VFR BLD AUTO: 37.2 % (ref 41–52)
HGB BLD-MCNC: 11.6 G/DL (ref 13.5–17.5)
MCH RBC QN AUTO: 27 PG (ref 26–34)
MCHC RBC AUTO-ENTMCNC: 31.2 G/DL (ref 32–36)
MCV RBC AUTO: 87 FL (ref 80–100)
NRBC BLD-RTO: 0 /100 WBCS (ref 0–0)
PLATELET # BLD AUTO: 204 X10*3/UL (ref 150–450)
POTASSIUM SERPL-SCNC: 4.1 MMOL/L (ref 3.5–5.3)
RBC # BLD AUTO: 4.3 X10*6/UL (ref 4.5–5.9)
SODIUM SERPL-SCNC: 136 MMOL/L (ref 136–145)
WBC # BLD AUTO: 4.4 X10*3/UL (ref 4.4–11.3)

## 2023-12-14 PROCEDURE — 36415 COLL VENOUS BLD VENIPUNCTURE: CPT | Performed by: NURSE PRACTITIONER

## 2023-12-14 PROCEDURE — 2500000004 HC RX 250 GENERAL PHARMACY W/ HCPCS (ALT 636 FOR OP/ED): Performed by: NURSE PRACTITIONER

## 2023-12-14 PROCEDURE — 2500000001 HC RX 250 WO HCPCS SELF ADMINISTERED DRUGS (ALT 637 FOR MEDICARE OP): Performed by: NURSE PRACTITIONER

## 2023-12-14 PROCEDURE — 80048 BASIC METABOLIC PNL TOTAL CA: CPT | Performed by: NURSE PRACTITIONER

## 2023-12-14 PROCEDURE — 2500000004 HC RX 250 GENERAL PHARMACY W/ HCPCS (ALT 636 FOR OP/ED): Performed by: PHYSICIAN ASSISTANT

## 2023-12-14 PROCEDURE — 99233 SBSQ HOSP IP/OBS HIGH 50: CPT | Performed by: NURSE PRACTITIONER

## 2023-12-14 PROCEDURE — 85027 COMPLETE CBC AUTOMATED: CPT | Performed by: NURSE PRACTITIONER

## 2023-12-14 PROCEDURE — G0378 HOSPITAL OBSERVATION PER HR: HCPCS

## 2023-12-14 RX ADMIN — LOSARTAN POTASSIUM 50 MG: 50 TABLET, FILM COATED ORAL at 08:56

## 2023-12-14 RX ADMIN — CARBIDOPA AND LEVODOPA 1 TABLET: 25; 100 TABLET ORAL at 14:52

## 2023-12-14 RX ADMIN — FUROSEMIDE 20 MG: 20 TABLET ORAL at 08:57

## 2023-12-14 RX ADMIN — SPIRONOLACTONE 25 MG: 25 TABLET ORAL at 08:56

## 2023-12-14 RX ADMIN — ATORVASTATIN CALCIUM 20 MG: 20 TABLET, FILM COATED ORAL at 08:57

## 2023-12-14 RX ADMIN — CARBIDOPA AND LEVODOPA 1 TABLET: 25; 100 TABLET ORAL at 08:57

## 2023-12-14 RX ADMIN — PANTOPRAZOLE SODIUM 40 MG: 40 TABLET, DELAYED RELEASE ORAL at 06:19

## 2023-12-14 ASSESSMENT — COGNITIVE AND FUNCTIONAL STATUS - GENERAL
MOVING TO AND FROM BED TO CHAIR: A LOT
TOILETING: A LOT
PERSONAL GROOMING: A LOT
EATING MEALS: A LOT
STANDING UP FROM CHAIR USING ARMS: A LOT
TURNING FROM BACK TO SIDE WHILE IN FLAT BAD: A LITTLE
HELP NEEDED FOR BATHING: A LOT
CLIMB 3 TO 5 STEPS WITH RAILING: TOTAL
DAILY ACTIVITIY SCORE: 12
DRESSING REGULAR LOWER BODY CLOTHING: A LOT
MOVING FROM LYING ON BACK TO SITTING ON SIDE OF FLAT BED WITH BEDRAILS: A LITTLE
DRESSING REGULAR UPPER BODY CLOTHING: A LOT
MOBILITY SCORE: 13
WALKING IN HOSPITAL ROOM: A LOT

## 2023-12-14 ASSESSMENT — PAIN SCALES - GENERAL: PAINLEVEL_OUTOF10: 0 - NO PAIN

## 2023-12-14 NOTE — PROGRESS NOTES
"Shashi Gutierrez is a 78 y.o. male on day 0 of admission presenting with Generalized weakness.    Subjective   Weakness/covid/rehab for physical therapy     ROS  Gen: No fatigue, fever, sweats.  Head: No headache, trauma.  Eyes: No vision loss, double vision, drainage, eye pain.  ENT: No hearing changes, pain, epistaxis, congestion  Cardiac: No chest pain  Pulmonary: No shortness of breath,  pleuritic pain,   Heme/lymph: No swollen glands  GI: No abdominal pain, nausea, vomiting, diarrhea  : No  dysuria, frequency, urgency, hematuria  Musculoskeletal: No limb pain, joint pain, back pain, joint swelling or stiffness.  Skin: No rashes, pruritus, lumps, lesions.  Neuro: No Numbness, tingling, POSITIVE weakness.  Psych: No  anxiety     Review of systems is otherwise negative unless stated above or in history of present illness.    Objective     Physical Exam  General: Vital signs stable, Pt is alert, no acute distress  Eyes: Conjunctiva normal, PERRL, EOMs intact  HENMT: Normocephalic, atraumatic, external ears and nose normal, no scars or masses.  No mastoid tenderness. Trachea is midline. No meningeal signs, negative Kernig and Brudzinski, moves neck freely.  No sinus tenderness  Resp: Respiratory effort is normal, no retractions, no stridor. Lungs CTA, no wheezes or rhonchi  CV: Heart is regular rate and rhythm.   Skin: No evidence of trauma, skin is warm and dry. No rashes, lesions or ulcers.  Skel: full range of motion of upper and lower extremities.   Neuro: Normal gait, CN II-XII intact, no motor or sensory changes.  Psych: Alert and oriented ×2-3, judgment is appropriate, normal mood and affect      Last Recorded Vitals  Blood pressure 120/80, pulse 90, temperature 37.5 °C (99.5 °F), temperature source Temporal, resp. rate 18, height 1.778 m (5' 10\"), weight 89.8 kg (198 lb), SpO2 95 %.  Intake/Output last 3 Shifts:  I/O last 3 completed shifts:  In: - (0 mL/kg)   Out: 1000 (11.1 mL/kg) [Urine:1000 (0.3 " mL/kg/hr)]  Weight: 89.8 kg     Relevant Results  Results for orders placed or performed during the hospital encounter of 12/11/23 (from the past 24 hour(s))   CBC   Result Value Ref Range    WBC 4.4 4.4 - 11.3 x10*3/uL    nRBC 0.0 0.0 - 0.0 /100 WBCs    RBC 4.30 (L) 4.50 - 5.90 x10*6/uL    Hemoglobin 11.6 (L) 13.5 - 17.5 g/dL    Hematocrit 37.2 (L) 41.0 - 52.0 %    MCV 87 80 - 100 fL    MCH 27.0 26.0 - 34.0 pg    MCHC 31.2 (L) 32.0 - 36.0 g/dL    RDW 13.0 11.5 - 14.5 %    Platelets 204 150 - 450 x10*3/uL   Basic Metabolic Panel   Result Value Ref Range    Glucose 72 (L) 74 - 99 mg/dL    Sodium 136 136 - 145 mmol/L    Potassium 4.1 3.5 - 5.3 mmol/L    Chloride 99 98 - 107 mmol/L    Bicarbonate 29 21 - 32 mmol/L    Anion Gap 12 10 - 20 mmol/L    Urea Nitrogen 19 6 - 23 mg/dL    Creatinine 1.06 0.50 - 1.30 mg/dL    eGFR 72 >60 mL/min/1.73m*2    Calcium 9.0 8.6 - 10.3 mg/dL       Imaging Results  XR chest 1 view    Result Date: 12/12/2023  STUDY: Chest Radiograph;  12/11/2023 11:08 PM INDICATION: Fatigue.  COMPARISON: XR chest 09/28/2023.  ACCESSION NUMBER(S): [id number] ORDERING CLINICIAN: RUBÉN PRESCOTT TECHNIQUE:  Frontal chest was obtained at 2308 hours. FINDINGS: CARDIOMEDIASTINAL SILHOUETTE: Cardiomediastinal silhouette is enlarged in size and configuration.  LUNGS: Minimally increased interstitial markings are noted bilaterally.  No focal airspace consolidation is identified.  ABDOMEN: No remarkable upper abdominal findings.  BONES: No acute osseous changes.    Cardiomegaly with mild pulmonary vascular congestion. Signed by Justin Klein      Medications:  atorvastatin, 20 mg, oral, Daily  carbidopa-levodopa, 1 tablet, oral, TID  docusate sodium, 100 mg, oral, Daily  furosemide, 20 mg, oral, Daily  losartan, 50 mg, oral, Daily  melatonin, 3 mg, oral, Daily  pantoprazole, 40 mg, oral, Daily before breakfast   Or  pantoprazole, 40 mg, intravenous, Daily before breakfast  rivaroxaban, 20 mg, oral, Daily with  evening meal  sennosides, 1 tablet, oral, BID  spironolactone, 25 mg, oral, Daily       PRN medications: acetaminophen, albuterol, fluticasone, loratadine     Assessment/Plan   Physical deconditioning  COVID-19  - weakness likely related to COVID-19 infection; he is otherwise asymptomatic and without respiratory symptoms or hypoxia   - supportive care; remdesivir/decadron not indicated given lack of symptoms or risk factors for severe illness  - PT/OT, fall precautions   - D Dimer non VTE normal at <215, already on therapeutic AC on xarelto (patient reports compliance)     Atrial fibrillation   - transient HR documented at 150-- EKG completed and shows NSR with rates in 80s  - continue telemetry monitoring   - continue xarelto     HFpEF   - evidence of mild volume overload on admission, improved   - continue lasix and spironolactone      HLD   - continue statin      Prostate cancer  - nil acute. Monitor for urinary retention      Parkinson's disease   - continue carbidopa-levodopa      CKD 3a  - creatinine consistent with baseline   - avoid nephrotoxic agents      Mild intermittent asthma   - not in exacerbation   - continue albuterol PRN      VTE/GI Prophylaxis   - therapeutic AC on xarelto   - bowel regimen in place      Discharge Planning   - PT/OT ordered recommended MOD     Labs/Testing reviewed     Interdisciplinary team rounding completed with hospitalist, nurse, TCC     NP discussed plan and lab/testing results with Dr. Bergeron    Patient getting auth to [facility] and will be discharged to facility via ambulance when ready     * 35 minutes total spent on patient's care today; >50% time spent on counseling/coordination of care       Emani Noe, APRN-CNP

## 2023-12-14 NOTE — NURSING NOTE
Patient has been picked up  by [facility]. Report given to EMT; patient's belongings (clothes, cell phone, , shoes, socks, underwear) taken by transport.

## 2023-12-14 NOTE — NURSING NOTE
Patient has been discharged to [facility]. Transportation set for 1730 on 12/14. Called [facility], spoke with nurse Bui in the Green unit and gave hand-off report. Denied any questions or concerns at this time.

## 2023-12-14 NOTE — CARE PLAN
Patient discharged to [facility] --      Problem: Pain - Adult  Goal: Verbalizes/displays adequate comfort level or baseline comfort level  Outcome: Adequate for Discharge     Problem: Safety - Adult  Goal: Free from fall injury  Outcome: Adequate for Discharge     Problem: Discharge Planning  Goal: Discharge to home or other facility with appropriate resources  Outcome: Adequate for Discharge     Problem: Chronic Conditions and Co-morbidities  Goal: Patient's chronic conditions and co-morbidity symptoms are monitored and maintained or improved  Outcome: Adequate for Discharge     Problem: Pain  Goal: Turns in bed with improved pain control throughout the shift  Outcome: Adequate for Discharge  Goal: Free from acute confusion related to pain meds throughout the shift  Outcome: Adequate for Discharge     Problem: Skin  Goal: Prevent/manage excess moisture  Outcome: Adequate for Discharge

## 2023-12-14 NOTE — CARE PLAN
"Per [facility]    \"The S Vehicle you requested for Shashi PAYTON in unit/room 102 on 12/14/2023 is scheduled to arrive at 4:30pm EST! [facility] is handling this ride and you can contact them at [phone].\"    Team  informed.    KHURRAM  called  son Glenn  at    [phone]    and left  a  VM  informing  him of  time.     Pamela Blandon, MSW, LSW      "

## 2023-12-14 NOTE — DISCHARGE SUMMARY
Discharge Diagnosis  Generalized weakness    Issues Requiring Follow-Up  Weakness and covid    Discharge Meds     Your medication list        CONTINUE taking these medications        Instructions Last Dose Given Next Dose Due   acetaminophen 325 mg tablet  Commonly known as: Tylenol           albuterol 90 mcg/actuation inhaler  Commonly known as: ProAir HFA      Inhale 2 puffs every 6 hours if needed for shortness of breath or wheezing.       atorvastatin 20 mg tablet  Commonly known as: Lipitor      Take 1 tablet (20 mg) by mouth once daily.       carbidopa-levodopa  mg tablet  Commonly known as: Sinemet      Take 1 tablet by mouth 3 times a day.       CENTRUM SILVER ORAL           docusate sodium 100 mg capsule  Commonly known as: Colace           fexofenadine 180 mg tablet  Commonly known as: Allegra           fluticasone 50 mcg/actuation nasal spray  Commonly known as: Flonase           furosemide 40 mg tablet  Commonly known as: Lasix      Take 0.5 tablets (20 mg) by mouth once daily.       losartan 50 mg tablet  Commonly known as: Cozaar      Take 1 tablet (50 mg) by mouth once daily.       potassium chloride CR 10 mEq ER tablet  Commonly known as: Klor-Con      Take 1 tablet (10 mEq) by mouth once daily.       rivaroxaban 20 mg tablet  Commonly known as: Xarelto      Take 1 tablet (20 mg) by mouth once daily.       sennosides 8.6 mg tablet  Commonly known as: Senokot      Take 1 tablet (8.6 mg) by mouth 2 times a day.       sildenafil 50 mg tablet  Commonly known as: Viagra                  ASK your doctor about these medications        Instructions Last Dose Given Next Dose Due   spironolactone 25 mg tablet  Commonly known as: Aldactone      Take 1 tablet (25 mg) by mouth once daily.                Test Results Pending At Discharge  Pending Labs       No current pending labs.            Hospital Course   Physical deconditioning  COVID-19  - weakness likely related to COVID-19 infection; he is otherwise  asymptomatic and without respiratory symptoms or hypoxia   - supportive care; remdesivir/decadron not indicated given lack of symptoms or risk factors for severe illness  - PT/OT, fall precautions   - D Dimer non VTE normal at <215, already on therapeutic AC on xarelto (patient reports compliance)     Atrial fibrillation   - transient HR documented at 150-- EKG completed and shows NSR with rates in 80s  - continue telemetry monitoring   - continue xarelto     HFpEF   - evidence of mild volume overload on admission, improved   - continue lasix and spironolactone      HLD   - continue statin      Prostate cancer  - nil acute. Monitor for urinary retention      Parkinson's disease   - continue carbidopa-levodopa      CKD 3a  - creatinine consistent with baseline   - avoid nephrotoxic agents      Mild intermittent asthma   - not in exacerbation   - continue albuterol PRN      VTE/GI Prophylaxis   - therapeutic AC on xarelto   - bowel regimen in place      Discharge Planning   - PT/OT ordered recommended MOD     Labs/Testing reviewed     Interdisciplinary team rounding completed with hospitalist, nurse, TCC     NP discussed plan and lab/testing results with Dr. Bergeron     Patient getting auth to [facility] and will be discharged to facility via ambulance when ready     * 35 minutes total spent on patient's care today; >50% time spent on counseling/coordination of care    Pertinent Physical Exam At Time of Discharge  Physical Exam  General: Vital signs stable, Pt is alert, no acute distress  Eyes: Conjunctiva normal, PERRL, EOMs intact  HENMT: Normocephalic, atraumatic, external ears and nose normal, no scars or masses.  No mastoid tenderness. Trachea is midline. No meningeal signs, negative Kernig and Brudzinski, moves neck freely.  No sinus tenderness  Resp: Respiratory effort is normal, no retractions, no stridor. Lungs CTA, no wheezes or rhonchi  CV: Heart is regular rate and rhythm.   Skin: No evidence of trauma,  skin is warm and dry. No rashes, lesions or ulcers.  Skel: full range of motion of upper and lower extremities.   Neuro: Normal gait, CN II-XII intact, no motor or sensory changes.  Psych: Alert and oriented ×2-3, judgment is appropriate, normal mood and affect      Outpatient Follow-Up  No future appointments.      Emani Noe, APRN-CNP

## 2023-12-14 NOTE — PROGRESS NOTES
[facility] able [facility] patient and [facility] are [facility]. Asked [facility] team to start auth for [facility].  Left vm for son Glenn [phone] letting him know [facility] is able to accept and auth is being started.

## 2023-12-15 ENCOUNTER — NURSING HOME VISIT (OUTPATIENT)
Dept: POST ACUTE CARE | Facility: EXTERNAL LOCATION | Age: 78
End: 2023-12-15
Payer: COMMERCIAL

## 2023-12-15 DIAGNOSIS — G20.A1 PARKINSON'S DISEASE WITHOUT DYSKINESIA, UNSPECIFIED WHETHER MANIFESTATIONS FLUCTUATE (MULTI): ICD-10-CM

## 2023-12-15 DIAGNOSIS — I48.91 ATRIAL FIBRILLATION, UNSPECIFIED TYPE (MULTI): Chronic | ICD-10-CM

## 2023-12-15 DIAGNOSIS — U07.1 COVID: ICD-10-CM

## 2023-12-15 DIAGNOSIS — E78.5 HYPERLIPIDEMIA, UNSPECIFIED HYPERLIPIDEMIA TYPE: ICD-10-CM

## 2023-12-15 DIAGNOSIS — R53.1 WEAKNESS: Primary | ICD-10-CM

## 2023-12-15 DIAGNOSIS — I10 HYPERTENSION, ESSENTIAL: ICD-10-CM

## 2023-12-15 DIAGNOSIS — J45.909 MODERATE ASTHMA, UNSPECIFIED WHETHER COMPLICATED, UNSPECIFIED WHETHER PERSISTENT (HHS-HCC): ICD-10-CM

## 2023-12-15 PROCEDURE — 99309 SBSQ NF CARE MODERATE MDM 30: CPT | Performed by: NURSE PRACTITIONER

## 2023-12-15 NOTE — LETTER
Patient: Shashi Gutierrez  : 1945    Encounter Date: 12/15/2023    PROGRESS NOTE    Subjective  Chief complaint: Shashi Gutierrez is a 78 y.o. male who is an acute skilled patient being seen and evaluated for weakness    HPI: Has been recently admitted to this facility for rehabilitation. Is recovering from COVID infection. Appears fatigued. Is motivated to return home. Reports poor appetite. Poor historian.   HPI      Objective  Vital signs: 130/74-80-18-97.4     Physical Exam  Constitutional:       General: He is not in acute distress.     Appearance: He is well-groomed and underweight.   Eyes:      Extraocular Movements: Extraocular movements intact.   Cardiovascular:      Rate and Rhythm: Normal rate and regular rhythm.   Pulmonary:      Effort: Pulmonary effort is normal.      Breath sounds: Normal breath sounds.      Comments: Lungs clear   Abdominal:      General: Bowel sounds are normal.      Palpations: Abdomen is soft.   Musculoskeletal:      Cervical back: Neck supple.      Right lower leg: No edema.      Left lower leg: No edema.   Neurological:      Mental Status: He is alert.   Psychiatric:         Mood and Affect: Mood normal.         Behavior: Behavior is cooperative.      Comments: Poor historian   Cooperative          Assessment/Plan  Problem List Items Addressed This Visit       Asthma     Monitor respiratory state   Requires albuterol sulfate          Hyperlipidemia     C/w atorvastatin         Hypertension, essential      Monitor BP and labs    Remains on spirolactone, losartan, furosemide         Parkinson's disease     C/w Sinemet         AF (atrial fibrillation) (CMS/HCC) (Chronic)      Requires rivaroxaban  Monitor rate and rhythm          Weakness - Primary     Required hospitalization. Debilitated. Requires rehabilitation   Monitor progress and endurance          COVID     Recovering from covid infection   Contact isolation   Monitor intake   Labs           Medications, treatments, and  labs reviewed  Continue medications and treatments as listed in EMR    TERENCE Chung      Electronically Signed By: TERENCE Chung   12/15/23  9:16 PM

## 2023-12-16 NOTE — PROGRESS NOTES
PROGRESS NOTE    Subjective   Chief complaint: Shashi Gutierrez is a 78 y.o. male who is an acute skilled patient being seen and evaluated for weakness    HPI: Has been recently admitted to this facility for rehabilitation. Is recovering from COVID infection. Appears fatigued. Is motivated to return home. Reports poor appetite. Poor historian.   HPI      Objective   Vital signs: 130/74-80-18-97.4     Physical Exam  Constitutional:       General: He is not in acute distress.     Appearance: He is well-groomed and underweight.   Eyes:      Extraocular Movements: Extraocular movements intact.   Cardiovascular:      Rate and Rhythm: Normal rate and regular rhythm.   Pulmonary:      Effort: Pulmonary effort is normal.      Breath sounds: Normal breath sounds.      Comments: Lungs clear   Abdominal:      General: Bowel sounds are normal.      Palpations: Abdomen is soft.   Musculoskeletal:      Cervical back: Neck supple.      Right lower leg: No edema.      Left lower leg: No edema.   Neurological:      Mental Status: He is alert.   Psychiatric:         Mood and Affect: Mood normal.         Behavior: Behavior is cooperative.      Comments: Poor historian   Cooperative          Assessment/Plan   Problem List Items Addressed This Visit       Asthma     Monitor respiratory state   Requires albuterol sulfate          Hyperlipidemia     C/w atorvastatin         Hypertension, essential      Monitor BP and labs    Remains on spirolactone, losartan, furosemide         Parkinson's disease     C/w Sinemet         AF (atrial fibrillation) (CMS/HCC) (Chronic)      Requires rivaroxaban  Monitor rate and rhythm          Weakness - Primary     Required hospitalization. Debilitated. Requires rehabilitation   Monitor progress and endurance          COVID     Recovering from covid infection   Contact isolation   Monitor intake   Labs           Medications, treatments, and labs reviewed  Continue medications and treatments as listed in  EMR    Zaira Fleming, APRN-CNP

## 2023-12-22 ENCOUNTER — NURSING HOME VISIT (OUTPATIENT)
Dept: POST ACUTE CARE | Facility: EXTERNAL LOCATION | Age: 78
End: 2023-12-22
Payer: COMMERCIAL

## 2023-12-22 DIAGNOSIS — J45.909 MODERATE ASTHMA, UNSPECIFIED WHETHER COMPLICATED, UNSPECIFIED WHETHER PERSISTENT (HHS-HCC): ICD-10-CM

## 2023-12-22 DIAGNOSIS — I48.91 ATRIAL FIBRILLATION, UNSPECIFIED TYPE (MULTI): Chronic | ICD-10-CM

## 2023-12-22 DIAGNOSIS — T78.40XD ALLERGY, SUBSEQUENT ENCOUNTER: ICD-10-CM

## 2023-12-22 DIAGNOSIS — G20.A1 PARKINSON'S DISEASE WITHOUT DYSKINESIA, UNSPECIFIED WHETHER MANIFESTATIONS FLUCTUATE (MULTI): ICD-10-CM

## 2023-12-22 DIAGNOSIS — U07.1 COVID: Primary | ICD-10-CM

## 2023-12-22 DIAGNOSIS — E78.5 HYPERLIPIDEMIA, UNSPECIFIED HYPERLIPIDEMIA TYPE: ICD-10-CM

## 2023-12-22 DIAGNOSIS — I10 HYPERTENSION, ESSENTIAL: ICD-10-CM

## 2023-12-22 DIAGNOSIS — R53.1 WEAKNESS: ICD-10-CM

## 2023-12-22 PROCEDURE — 99309 SBSQ NF CARE MODERATE MDM 30: CPT | Performed by: NURSE PRACTITIONER

## 2023-12-22 NOTE — LETTER
Patient: Shashi Gutierrez  : 1945    Encounter Date: 2023    PROGRESS NOTE    Subjective  Chief complaint: Shashi Gutierrez is a 78 y.o. male who is an acute skilled patient being seen and evaluated for weakness    HPI: remains in therapy. The therapist reports that he is doing well and has made good progress. Just finished lunch. Intake poor. Pleasant and talkative. Is slow to respond. Delon any chest pain or tightness  Denies any pain.   HPI      Objective  Vital signs: 122/74-82-18-97.4     Physical Exam  Vitals and nursing note reviewed.   Constitutional:       General: He is not in acute distress.     Appearance: He is well-groomed and underweight.   Eyes:      Extraocular Movements: Extraocular movements intact.   Cardiovascular:      Rate and Rhythm: Normal rate and regular rhythm.   Pulmonary:      Effort: Pulmonary effort is normal.      Breath sounds: Normal breath sounds.      Comments: Lungs clear   Abdominal:      General: Bowel sounds are normal.      Palpations: Abdomen is soft.   Musculoskeletal:      Cervical back: Neck supple.      Right lower leg: Edema present.      Left lower leg: Edema present.      Comments: +1 dependent edema in lower legs    Neurological:      Mental Status: He is alert.   Psychiatric:         Mood and Affect: Mood normal.         Behavior: Behavior is cooperative.         Assessment/Plan  Problem List Items Addressed This Visit       Asthma     Monitor respiratory state   Requires albuterol sulfate          Hyperlipidemia     C/w atorvastatin         Hypertension, essential      Monitor BP and labs    Remains on spirolactone, losartan, furosemide  23  BUN 5.4, creatine - 1.8   Reduced lasix     Will monitor          Parkinson's disease     C/w Sinemet         AF (atrial fibrillation) (CMS/HCC) (Chronic)     >>ASSESSMENT AND PLAN FOR AF (ATRIAL FIBRILLATION) (CMS/HCC) WRITTEN ON 2023  4:45 PM BY ASHLEY LUJAN-CNP     Requires rivaroxaban  Monitor  rate and rhythm            Allergies     C/w allegra, Fluticasone Propionate   Monitor symptoms and need          Weakness     Required hospitalization. Debilitated. Requires rehabilitation   Monitor progress and endurance          COVID - Primary     Covid has resolved. Is no longer in isolation.   Lungs clear   Afebrile           Medications, treatments, and labs reviewed  Continue medications and treatments as listed in EMR    TERENCE Chung      Electronically Signed By: TERENCE Chung   12/23/23  4:51 PM

## 2023-12-23 PROBLEM — N28.9 KIDNEY LESION: Status: RESOLVED | Noted: 2023-02-12 | Resolved: 2023-12-23

## 2023-12-23 PROBLEM — E87.6 DIURETIC-INDUCED HYPOKALEMIA: Status: RESOLVED | Noted: 2023-10-10 | Resolved: 2023-12-23

## 2023-12-23 PROBLEM — N52.9 ED (ERECTILE DYSFUNCTION): Status: RESOLVED | Noted: 2023-02-12 | Resolved: 2023-12-23

## 2023-12-23 PROBLEM — T50.2X5A DIURETIC-INDUCED HYPOKALEMIA: Status: RESOLVED | Noted: 2023-10-10 | Resolved: 2023-12-23

## 2023-12-23 PROBLEM — B35.1 ONYCHOMYCOSIS: Status: RESOLVED | Noted: 2023-05-05 | Resolved: 2023-12-23

## 2023-12-23 PROBLEM — R25.1 OCCASIONAL TREMORS: Status: RESOLVED | Noted: 2023-02-12 | Resolved: 2023-12-23

## 2023-12-23 NOTE — ASSESSMENT & PLAN NOTE
>>ASSESSMENT AND PLAN FOR AF (ATRIAL FIBRILLATION) (CMS/[facility]) WRITTEN ON 12/23/2023  4:45 PM BY ASHLEY LUJAN-CNP     Requires rivaroxaban  Monitor rate and rhythm

## 2023-12-23 NOTE — PROGRESS NOTES
PROGRESS NOTE    Subjective   Chief complaint: Shashi Gutierrez is a 78 y.o. male who is an acute skilled patient being seen and evaluated for weakness    HPI: remains in therapy. The therapist reports that he is doing well and has made good progress. Just finished lunch. Intake poor. Pleasant and talkative. Is slow to respond. Delon any chest pain or tightness  Denies any pain.   HPI      Objective   Vital signs: 122/74-82-18-97.4     Physical Exam  Vitals and nursing note reviewed.   Constitutional:       General: He is not in acute distress.     Appearance: He is well-groomed and underweight.   Eyes:      Extraocular Movements: Extraocular movements intact.   Cardiovascular:      Rate and Rhythm: Normal rate and regular rhythm.   Pulmonary:      Effort: Pulmonary effort is normal.      Breath sounds: Normal breath sounds.      Comments: Lungs clear   Abdominal:      General: Bowel sounds are normal.      Palpations: Abdomen is soft.   Musculoskeletal:      Cervical back: Neck supple.      Right lower leg: Edema present.      Left lower leg: Edema present.      Comments: +1 dependent edema in lower legs    Neurological:      Mental Status: He is alert.   Psychiatric:         Mood and Affect: Mood normal.         Behavior: Behavior is cooperative.         Assessment/Plan   Problem List Items Addressed This Visit       Asthma     Monitor respiratory state   Requires albuterol sulfate          Hyperlipidemia     C/w atorvastatin         Hypertension, essential      Monitor BP and labs    Remains on spirolactone, losartan, furosemide  12/14/23  BUN 5.4, creatine - 1.8   Reduced lasix     Will monitor          Parkinson's disease     C/w Sinemet         AF (atrial fibrillation) (CMS/HCC) (Chronic)     >>ASSESSMENT AND PLAN FOR AF (ATRIAL FIBRILLATION) (CMS/[facility]) WRITTEN ON 12/23/2023  4:45 PM BY ASHLEY LUJAN-CNP     Requires rivaroxaban  Monitor rate and rhythm            Allergies     C/w allegra, Fluticasone  Propionate   Monitor symptoms and need          Weakness     Required hospitalization. Debilitated. Requires rehabilitation   Monitor progress and endurance          COVID - Primary     Covid has resolved. Is no longer in isolation.   Lungs clear   Afebrile           Medications, treatments, and labs reviewed  Continue medications and treatments as listed in EMR    Zaira Fleming, APRN-CNP

## 2023-12-23 NOTE — ASSESSMENT & PLAN NOTE
Monitor BP and labs    Remains on spirolactone, losartan, furosemide  12/14/23  BUN 5.4, creatine - 1.8   Reduced lasix     Will monitor

## 2023-12-26 ENCOUNTER — NURSING HOME VISIT (OUTPATIENT)
Dept: POST ACUTE CARE | Facility: EXTERNAL LOCATION | Age: 78
End: 2023-12-26
Payer: COMMERCIAL

## 2023-12-26 DIAGNOSIS — G20.A1 PARKINSON'S DISEASE WITHOUT DYSKINESIA, UNSPECIFIED WHETHER MANIFESTATIONS FLUCTUATE (MULTI): Primary | ICD-10-CM

## 2023-12-26 DIAGNOSIS — U07.1 COVID: ICD-10-CM

## 2023-12-26 DIAGNOSIS — E78.5 HYPERLIPIDEMIA, UNSPECIFIED HYPERLIPIDEMIA TYPE: ICD-10-CM

## 2023-12-26 DIAGNOSIS — I48.91 ATRIAL FIBRILLATION, UNSPECIFIED TYPE (MULTI): Chronic | ICD-10-CM

## 2023-12-26 DIAGNOSIS — I10 HYPERTENSION, ESSENTIAL: ICD-10-CM

## 2023-12-26 PROCEDURE — 99305 1ST NF CARE MODERATE MDM 35: CPT | Performed by: INTERNAL MEDICINE

## 2023-12-26 NOTE — PROGRESS NOTES
HISTORY & PHYSICAL    Subjective   Chief complaint: Shashi Gutierrez is a 78 y.o. male who is being seen and evaluated for multiple medical problems.  Patient admitted to SNF for therapy due to weakness after recent hospitalization.    HPI:  HPI  Patient was treated in the hospital for COVID-19, was asymptomatic without respiratory symptoms or hypoxia.  Patient was provided supportive care, remdesivir\Decadron not indicated due to lack of symptoms.  Patient appeared to be volume overloaded on admission and improved on Lasix and spironolactone.  Patient has a past medical history of A-fib, HFpEF, prostate cancer, Parkinson's, CKD 3, asthma.  PT OT evaluated patient recommending further therapy and SNF placement.  Patient was deemed stable for discharge back to skilled nursing facility for continued medical management and therapy.  Patient was seen and examined at bedside, no acute distress.  Denies chest pain or shortness of breath.  Denies nausea, vomiting.    Past Medical History:   Diagnosis Date    Diuretic-induced hypokalemia 10/10/2023    ED (erectile dysfunction) 02/12/2023    Encounter for general adult medical examination without abnormal findings 03/04/2019    Encounter for annual health examination    Encounter for general adult medical examination without abnormal findings 01/03/2021    Encounter for annual health examination    Encounter for screening for malignant neoplasm of prostate     Encounter for prostate cancer screening    Internal hemorrhoid 09/14/2012    Kidney lesion 02/12/2023    Occasional tremors 02/12/2023    Onychomycosis 05/05/2023       Past Surgical History:   Procedure Laterality Date    CT ANGIO CORONARY ART WITH HEARTFLOW IF SCORE >30%  8/27/2018    CT HEART CORONARY ANGIOGRAM 8/27/2018 [facility]       Family History   Problem Relation Name Age of Onset    No Known Problems Other         Social History     Socioeconomic History    Marital status:      Spouse name:  Not on file    Number of children: Not on file    Years of education: Not on file    Highest education level: Not on file   Occupational History    Not on file   Tobacco Use    Smoking status: Former     Types: Cigars     Quit date:      Years since quittin.0    Smokeless tobacco: Never   Substance and Sexual Activity    Alcohol use: Not Currently    Drug use: Never    Sexual activity: Not on file   Other Topics Concern    Not on file   Social History Narrative    Not on file     Social Determinants of Health     Financial Resource Strain: Low Risk  (2023)    Overall Financial Resource Strain (CARDIA)     Difficulty of Paying Living Expenses: Not very hard   Food Insecurity: Not on file   Transportation Needs: No Transportation Needs (2023)    PRAPARE - Transportation     Lack of Transportation (Medical): No     Lack of Transportation (Non-Medical): No   Physical Activity: Not on file   Stress: Not on file   Social Connections: Not on file   Intimate Partner Violence: Not on file   Housing Stability: Low Risk  (2023)    Housing Stability Vital Sign     Unable to Pay for Housing in the Last Year: No     Number of Places Lived in the Last Year: 1     Unstable Housing in the Last Year: No       Vital signs: 142/72, 98.7, 80, 20, 98%    Objective   Physical Exam  Constitutional:       General: He is not in acute distress.  Eyes:      Extraocular Movements: Extraocular movements intact.   Cardiovascular:      Rate and Rhythm: Normal rate and regular rhythm.   Pulmonary:      Effort: Pulmonary effort is normal.      Breath sounds: Normal breath sounds.   Abdominal:      General: Bowel sounds are normal.      Palpations: Abdomen is soft.   Musculoskeletal:      Cervical back: Neck supple.      Right lower leg: No edema.      Left lower leg: No edema.   Neurological:      Mental Status: He is alert.   Psychiatric:         Mood and Affect: Mood normal.         Behavior: Behavior is cooperative.          Assessment/Plan   Problem List Items Addressed This Visit       Hyperlipidemia     Statin  Monitor labs routinely         Hypertension, essential     Losartan potassium  Spirolactone   furosemide  Monitor labs         Parkinson's disease - Primary     Carbidopa levodopa  Monitor           AF (atrial fibrillation) (CMS/[facility]) (Chronic)     Rivaroxaban  Monitor heart rate  Bleeding precautions         COVID     Recovered  Did not require remdesivir or Decadron in hospital due to asymptomatic          Hospital records reviewed  Medications, treatments, and labs reviewed  Continue medications and treatments as listed in EMR  Discussed with nursing and therapy      Scribe Attestation  I, Ayse Tinoco   attest that this documentation has been prepared under the direction and in the presence of Tiburcio Dueñas MD    Provider Attestation - Scribe documentation  All medical record entries made by the Scribe were at my direction and personally dictated by me. I have reviewed the chart and agree that the record accurately reflects my personal performance of the history, physical exam, discussion and plan.   Tiburcio Dueñas MD

## 2023-12-26 NOTE — LETTER
Patient: Shashi Gutierrez  : 1945    Encounter Date: 2023    HISTORY & PHYSICAL    Subjective  Chief complaint: Shashi Gutierrez is a 78 y.o. male who is being seen and evaluated for multiple medical problems.  Patient admitted to SNF for therapy due to weakness after recent hospitalization.    HPI:  HPI  Patient was treated in the hospital for COVID-19, was asymptomatic without respiratory symptoms or hypoxia.  Patient was provided supportive care, remdesivir\Decadron not indicated due to lack of symptoms.  Patient appeared to be volume overloaded on admission and improved on Lasix and spironolactone.  Patient has a past medical history of A-fib, HFpEF, prostate cancer, Parkinson's, CKD 3, asthma.  PT OT evaluated patient recommending further therapy and SNF placement.  Patient was deemed stable for discharge back to skilled nursing facility for continued medical management and therapy.  Patient was seen and examined at bedside, no acute distress.  Denies chest pain or shortness of breath.  Denies nausea, vomiting.    Past Medical History:   Diagnosis Date   • Diuretic-induced hypokalemia 10/10/2023   • ED (erectile dysfunction) 2023   • Encounter for general adult medical examination without abnormal findings 2019    Encounter for annual health examination   • Encounter for general adult medical examination without abnormal findings 2021    Encounter for annual health examination   • Encounter for screening for malignant neoplasm of prostate     Encounter for prostate cancer screening   • Internal hemorrhoid 2012   • Kidney lesion 2023   • Occasional tremors 2023   • Onychomycosis 2023       Past Surgical History:   Procedure Laterality Date   • CT ANGIO CORONARY ART WITH HEARTFLOW IF SCORE >30%  2018    CT HEART CORONARY ANGIOGRAM 2018 [facility]       Family History   Problem Relation Name Age of Onset   • No Known Problems Other         Social  History     Socioeconomic History   • Marital status:      Spouse name: Not on file   • Number of children: Not on file   • Years of education: Not on file   • Highest education level: Not on file   Occupational History   • Not on file   Tobacco Use   • Smoking status: Former     Types: Cigars     Quit date:      Years since quittin.0   • Smokeless tobacco: Never   Substance and Sexual Activity   • Alcohol use: Not Currently   • Drug use: Never   • Sexual activity: Not on file   Other Topics Concern   • Not on file   Social History Narrative   • Not on file     Social Determinants of Health     Financial Resource Strain: Low Risk  (2023)    Overall Financial Resource Strain (CARDIA)    • Difficulty of Paying Living Expenses: Not very hard   Food Insecurity: Not on file   Transportation Needs: No Transportation Needs (2023)    PRAPARE - Transportation    • Lack of Transportation (Medical): No    • Lack of Transportation (Non-Medical): No   Physical Activity: Not on file   Stress: Not on file   Social Connections: Not on file   Intimate Partner Violence: Not on file   Housing Stability: Low Risk  (2023)    Housing Stability Vital Sign    • Unable to Pay for Housing in the Last Year: No    • Number of Places Lived in the Last Year: 1    • Unstable Housing in the Last Year: No       Vital signs: 142/72, 98.7, 80, 20, 98%    Objective  Physical Exam  Constitutional:       General: He is not in acute distress.  Eyes:      Extraocular Movements: Extraocular movements intact.   Cardiovascular:      Rate and Rhythm: Normal rate and regular rhythm.   Pulmonary:      Effort: Pulmonary effort is normal.      Breath sounds: Normal breath sounds.   Abdominal:      General: Bowel sounds are normal.      Palpations: Abdomen is soft.   Musculoskeletal:      Cervical back: Neck supple.      Right lower leg: No edema.      Left lower leg: No edema.   Neurological:      Mental Status: He is alert.    Psychiatric:         Mood and Affect: Mood normal.         Behavior: Behavior is cooperative.         Assessment/Plan  Problem List Items Addressed This Visit       Hyperlipidemia     Statin  Monitor labs routinely         Hypertension, essential     Losartan potassium  Spirolactone   furosemide  Monitor labs         Parkinson's disease - Primary     Carbidopa levodopa  Monitor           AF (atrial fibrillation) (CMS/HCC) (Chronic)     Rivaroxaban  Monitor heart rate  Bleeding precautions         COVID     Recovered  Did not require remdesivir or Decadron in hospital due to asymptomatic          Hospital records reviewed  Medications, treatments, and labs reviewed  Continue medications and treatments as listed in EMR  Discussed with nursing and therapy      Scribe Attestation  I, Misty Ross Scribe   attest that this documentation has been prepared under the direction and in the presence of Tiburcio Dueñas MD    Provider Attestation - Scribe documentation  All medical record entries made by the Scribe were at my direction and personally dictated by me. I have reviewed the chart and agree that the record accurately reflects my personal performance of the history, physical exam, discussion and plan.   Tiburcio Dueñas MD      Electronically Signed By: Tiburcio Dueñas MD   12/26/23  3:17 PM

## 2023-12-29 ENCOUNTER — NURSING HOME VISIT (OUTPATIENT)
Dept: POST ACUTE CARE | Facility: EXTERNAL LOCATION | Age: 78
End: 2023-12-29
Payer: COMMERCIAL

## 2023-12-29 DIAGNOSIS — I48.91 ATRIAL FIBRILLATION, UNSPECIFIED TYPE (MULTI): Chronic | ICD-10-CM

## 2023-12-29 DIAGNOSIS — I10 HYPERTENSION, ESSENTIAL: ICD-10-CM

## 2023-12-29 DIAGNOSIS — N40.1 BPH WITH OBSTRUCTION/LOWER URINARY TRACT SYMPTOMS: ICD-10-CM

## 2023-12-29 DIAGNOSIS — G20.A1 PARKINSON'S DISEASE WITHOUT DYSKINESIA, UNSPECIFIED WHETHER MANIFESTATIONS FLUCTUATE (MULTI): ICD-10-CM

## 2023-12-29 DIAGNOSIS — N13.8 BPH WITH OBSTRUCTION/LOWER URINARY TRACT SYMPTOMS: ICD-10-CM

## 2023-12-29 DIAGNOSIS — R53.1 WEAKNESS: Primary | ICD-10-CM

## 2023-12-29 PROCEDURE — 99309 SBSQ NF CARE MODERATE MDM 30: CPT | Performed by: NURSE PRACTITIONER

## 2023-12-29 NOTE — PROGRESS NOTES
PROGRESS NOTE    Subjective   Chief complaint: Shashi Gutierrez is a 78 y.o. male who is an acute skilled patient being seen and evaluated for weakness    HPI: therapy reports that he is making good progress. He reports that he is feeling stronger. Nursing reports that his appetite is consistent.   HPI      Objective   Vital signs: 136/66-72-18-97.4     Physical Exam  Constitutional:       General: He is not in acute distress.     Appearance: He is well-groomed and underweight.   Eyes:      Extraocular Movements: Extraocular movements intact.   Cardiovascular:      Rate and Rhythm: Normal rate and regular rhythm.   Pulmonary:      Effort: Pulmonary effort is normal.      Breath sounds: Normal breath sounds.      Comments: Lungs clear   Abdominal:      General: Bowel sounds are normal.      Palpations: Abdomen is soft.   Musculoskeletal:      Cervical back: Neck supple.      Comments: +2 dependent edema in lower legs    Neurological:      Mental Status: He is alert.   Psychiatric:         Mood and Affect: Mood normal.         Behavior: Behavior is cooperative.         Assessment/Plan   Problem List Items Addressed This Visit       BPH with obstruction/lower urinary tract symptoms     Flomax         Hypertension, essential      Monitor BP and labs    Remains on spirolactone, losartan, furosemide  BP stable.   Will monitor          Parkinson's disease     Carbidopa levodopa  Monitor           AF (atrial fibrillation) (CMS/[facility]) (Chronic)       Heart rate controlled  Anticoagulant  Monitor for bleeding         Weakness - Primary     Remains in therapy   Monitor progress and endurance           Medications, treatments, and labs reviewed  Continue medications and treatments as listed in EMR    ASHLEY Chung-CNP

## 2023-12-29 NOTE — LETTER
Patient: Shashi Gutierrez  : 1945    Encounter Date: 2023    PROGRESS NOTE    Subjective  Chief complaint: Shashi Gutierrez is a 78 y.o. male who is an acute skilled patient being seen and evaluated for weakness    HPI: therapy reports that he is making good progress. He reports that he is feeling stronger. Nursing reports that his appetite is consistent.   HPI      Objective  Vital signs: 136/66-72-18-97.4     Physical Exam  Constitutional:       General: He is not in acute distress.     Appearance: He is well-groomed and underweight.   Eyes:      Extraocular Movements: Extraocular movements intact.   Cardiovascular:      Rate and Rhythm: Normal rate and regular rhythm.   Pulmonary:      Effort: Pulmonary effort is normal.      Breath sounds: Normal breath sounds.      Comments: Lungs clear   Abdominal:      General: Bowel sounds are normal.      Palpations: Abdomen is soft.   Musculoskeletal:      Cervical back: Neck supple.      Comments: +2 dependent edema in lower legs    Neurological:      Mental Status: He is alert.   Psychiatric:         Mood and Affect: Mood normal.         Behavior: Behavior is cooperative.         Assessment/Plan  Problem List Items Addressed This Visit       BPH with obstruction/lower urinary tract symptoms     Flomax         Hypertension, essential      Monitor BP and labs    Remains on spirolactone, losartan, furosemide  BP stable.   Will monitor          Parkinson's disease     Carbidopa levodopa  Monitor           AF (atrial fibrillation) (CMS/HCC) (Chronic)       Heart rate controlled  Anticoagulant  Monitor for bleeding         Weakness - Primary     Remains in therapy   Monitor progress and endurance           Medications, treatments, and labs reviewed  Continue medications and treatments as listed in EMR    TERENCE Chung      Electronically Signed By: TERENCE Chung   23  6:58 PM

## 2024-01-02 ENCOUNTER — PATIENT OUTREACH (OUTPATIENT)
Dept: PRIMARY CARE | Facility: CLINIC | Age: 79
End: 2024-01-02
Payer: COMMERCIAL

## 2024-01-02 ENCOUNTER — DOCUMENTATION (OUTPATIENT)
Dept: PRIMARY CARE | Facility: CLINIC | Age: 79
End: 2024-01-02
Payer: COMMERCIAL

## 2024-01-02 NOTE — PROGRESS NOTES
Discharge facility: [facility]  Discharge diagnosis: Weakness and COVID  Admission date: 12-14-23  Discharge date: 12-30-23    PCP Appointment Date: message to office, patient wishes for his son to schedule  Specialist Appointment Date: n/a  Hospital Encounter and Summary: Linked    See Discharge assessment below for further details    Engagement  Call Start Time: 1517 (1/2/2024  3:22 PM)    Medications  Medications reviewed with patient/caregiver?: No (Patient reports son gives him his medicines) (1/2/2024  3:22 PM)  Is the patient having any side effects they believe may be caused by any medication additions or changes?: No (1/2/2024  3:22 PM)  Does the patient have all medications ordered at discharge?: Yes (Patient thinks he has them but his manages them) (1/2/2024  3:22 PM)  Care Management Interventions: Provided patient education (Encouraged patient to have son call me) (1/2/2024  3:22 PM)  Prescription Comments: Denies any new or changed medications since going to hospital then snf and is now home (1/2/2024  3:22 PM)  Is the patient taking all medications as directed (includes completed medication regime)?: -- (reports that he thinks he is) (1/2/2024  3:22 PM)  Care Management Interventions: Provided patient education (1/2/2024  3:22 PM)  Medication Comments: Son manages medications. Son not available. Patient staed he would have sone call with any questions (1/2/2024  3:22 PM)  Follow Up Tasks: -- (med reconcile at Dr mazariegos) (1/2/2024  3:22 PM)    Appointments  Does the patient have a primary care provider?: Yes (1/2/2024  3:22 PM)  Care Management Interventions: Educated patient on importance of making appointment; Advised patient to make appointment (1/2/2024  3:22 PM)  Has the patient kept scheduled appointments due by today?: Yes (1/2/2024  3:22 PM)  Care Management Interventions: Educated on importance of keeping appointment (1/2/2024  3:22 PM)  Follow Up Tasks: -- (n/a) (1/2/2024  3:22  PM)    Self Management  What is the home health agency?: n/a (1/2/2024  3:22 PM)  Has home health visited the patient within 72 hours of discharge?: Not applicable (1/2/2024  3:22 PM)  Home Health Interventions: -- (n/a) (1/2/2024  3:22 PM)  What Durable Medical Equipment (DME) was ordered?: -- (n/a) (1/2/2024  3:22 PM)  Has all Durable Medical Equipment (DME) been delivered?: -- (n/a) (1/2/2024  3:22 PM)  DME Interventions: -- (n/a) (1/2/2024  3:22 PM)  Care Management Interventions: Notified PCP/provider (1/2/2024  3:22 PM)  Follow Up Tasks: -- (n/a) (1/2/2024  3:22 PM)    Patient Teaching  Does the patient have access to their discharge instructions?: No (reports his son has them) (1/2/2024  3:22 PM)  Care Management Interventions: -- (reviewed hospital discharge instructions with patient. No SNF discharge papers) (1/2/2024  3:22 PM)  What is the patient's perception of their health status since discharge?: Improving (1/2/2024  3:22 PM)  Is the patient/caregiver able to teach back the hierarchy of who to call/visit for symptoms/problems? PCP, Specialist, Home Health nurse, Urgent Care, ED, 911: Yes (1/2/2024  3:22 PM)  If the patient is a current smoker, are they able to teach back resources for cessation?: -- (n/a) (1/2/2024  3:22 PM)  Patient/Caregiver Education Comments: Instructed on discharge instructions from hospital. Instructed if increased shortness of breath or chest pain to call 911. Provided my contact information and encouraged to call with any questions. (1/2/2024  3:22 PM)    Wrap Up  Is the patient/caregiver familiar with Advance Care Planning?: Yes (1/2/2024  3:22 PM)  Would the patient like more information on Advance Care Planning?: No (1/2/2024  3:22 PM)  Call End Time: 1525 (1/2/2024  3:22 PM)

## 2024-01-10 ENCOUNTER — PATIENT OUTREACH (OUTPATIENT)
Dept: PRIMARY CARE | Facility: CLINIC | Age: 79
End: 2024-01-10
Payer: COMMERCIAL

## 2024-01-10 NOTE — PROGRESS NOTES
Unable to reach patient for call back .    LVM with call back number for patient to call if needed as well to encourage to call and schedule follow up appt with PCP  If no voicemail available call attempts x 2 were made to contact the patient to assist with any questions or concerns patient may have.

## 2024-01-31 ENCOUNTER — PATIENT OUTREACH (OUTPATIENT)
Dept: PRIMARY CARE | Facility: CLINIC | Age: 79
End: 2024-01-31
Payer: COMMERCIAL

## 2024-01-31 NOTE — PROGRESS NOTES
1 month follow up call to patient.  At time of outreach call the patient feels as if their condition has improved. He reports his son usually schedules his appointments for him. Encouraged him to have his son schedule post rehab appointment for him. He stated he will ask him to. Reports he is taking all medications as he is suppose to and doing ok.   Addressed any questions or concerns.

## 2024-03-05 DIAGNOSIS — I10 HYPERTENSION, ESSENTIAL: ICD-10-CM

## 2024-03-05 RX ORDER — FUROSEMIDE 40 MG/1
20 TABLET ORAL DAILY
Qty: 45 TABLET | Refills: 2 | Status: SHIPPED | OUTPATIENT
Start: 2024-03-05

## 2024-03-27 ENCOUNTER — PATIENT OUTREACH (OUTPATIENT)
Dept: PRIMARY CARE | Facility: CLINIC | Age: 79
End: 2024-03-27
Payer: COMMERCIAL

## 2024-05-22 DIAGNOSIS — G20.A1 PARKINSON'S DISEASE (MULTI): ICD-10-CM

## 2024-05-22 RX ORDER — CARBIDOPA AND LEVODOPA 25; 100 MG/1; MG/1
1 TABLET ORAL 3 TIMES DAILY
Qty: 90 TABLET | Refills: 0 | Status: CANCELLED | OUTPATIENT
Start: 2024-05-22

## 2024-06-27 ENCOUNTER — OFFICE VISIT (OUTPATIENT)
Dept: PRIMARY CARE | Facility: CLINIC | Age: 79
End: 2024-06-27
Payer: COMMERCIAL

## 2024-06-27 VITALS
OXYGEN SATURATION: 94 % | RESPIRATION RATE: 12 BRPM | HEART RATE: 61 BPM | SYSTOLIC BLOOD PRESSURE: 127 MMHG | DIASTOLIC BLOOD PRESSURE: 74 MMHG

## 2024-06-27 DIAGNOSIS — M48.00 SPINAL STENOSIS, UNSPECIFIED SPINAL REGION: ICD-10-CM

## 2024-06-27 DIAGNOSIS — I10 PRIMARY HYPERTENSION: ICD-10-CM

## 2024-06-27 DIAGNOSIS — D50.9 IRON DEFICIENCY ANEMIA, UNSPECIFIED IRON DEFICIENCY ANEMIA TYPE: ICD-10-CM

## 2024-06-27 DIAGNOSIS — I10 BENIGN ESSENTIAL HYPERTENSION: ICD-10-CM

## 2024-06-27 DIAGNOSIS — J45.909 MODERATE ASTHMA, UNSPECIFIED WHETHER COMPLICATED, UNSPECIFIED WHETHER PERSISTENT (HHS-HCC): ICD-10-CM

## 2024-06-27 DIAGNOSIS — E78.00 ELEVATED LDL CHOLESTEROL LEVEL: ICD-10-CM

## 2024-06-27 DIAGNOSIS — T78.40XD ALLERGY, SUBSEQUENT ENCOUNTER: ICD-10-CM

## 2024-06-27 DIAGNOSIS — I10 HYPERTENSION, ESSENTIAL: ICD-10-CM

## 2024-06-27 DIAGNOSIS — I50.9 CHRONIC CONGESTIVE HEART FAILURE, UNSPECIFIED HEART FAILURE TYPE (MULTI): ICD-10-CM

## 2024-06-27 DIAGNOSIS — E78.5 HYPERLIPIDEMIA, UNSPECIFIED HYPERLIPIDEMIA TYPE: ICD-10-CM

## 2024-06-27 DIAGNOSIS — G20.A1 PARKINSON'S DISEASE WITHOUT DYSKINESIA, UNSPECIFIED WHETHER MANIFESTATIONS FLUCTUATE (MULTI): ICD-10-CM

## 2024-06-27 DIAGNOSIS — K59.00 CONSTIPATION, UNSPECIFIED CONSTIPATION TYPE: ICD-10-CM

## 2024-06-27 DIAGNOSIS — G20.A1 PARKINSON'S DISEASE (MULTI): ICD-10-CM

## 2024-06-27 DIAGNOSIS — I48.91 ATRIAL FIBRILLATION, UNSPECIFIED TYPE (MULTI): Primary | Chronic | ICD-10-CM

## 2024-06-27 DIAGNOSIS — Z12.5 SCREENING PSA (PROSTATE SPECIFIC ANTIGEN): ICD-10-CM

## 2024-06-27 DIAGNOSIS — Z00.00 ENCOUNTER FOR ANNUAL WELLNESS VISIT (AWV) IN MEDICARE PATIENT: ICD-10-CM

## 2024-06-27 DIAGNOSIS — E03.9 HYPOTHYROIDISM, UNSPECIFIED TYPE: ICD-10-CM

## 2024-06-27 PROBLEM — R01.1 HEART MURMUR: Status: ACTIVE | Noted: 2024-06-27

## 2024-06-27 PROBLEM — D64.9 ANEMIA: Status: ACTIVE | Noted: 2024-06-27

## 2024-06-27 LAB
25(OH)D3 SERPL-MCNC: 17 NG/ML (ref 30–100)
ALBUMIN SERPL BCP-MCNC: 4.3 G/DL (ref 3.4–5)
ALP SERPL-CCNC: 50 U/L (ref 33–136)
ALT SERPL W P-5'-P-CCNC: 11 U/L (ref 10–52)
ANION GAP SERPL CALC-SCNC: 13 MMOL/L (ref 10–20)
AST SERPL W P-5'-P-CCNC: 22 U/L (ref 9–39)
BILIRUB SERPL-MCNC: 0.7 MG/DL (ref 0–1.2)
BUN SERPL-MCNC: 17 MG/DL (ref 6–23)
CALCIUM SERPL-MCNC: 9.3 MG/DL (ref 8.6–10.6)
CHLORIDE SERPL-SCNC: 104 MMOL/L (ref 98–107)
CHOLEST SERPL-MCNC: 124 MG/DL (ref 0–199)
CHOLESTEROL/HDL RATIO: 2.6
CO2 SERPL-SCNC: 26 MMOL/L (ref 21–32)
CREAT SERPL-MCNC: 0.98 MG/DL (ref 0.5–1.3)
EGFRCR SERPLBLD CKD-EPI 2021: 79 ML/MIN/1.73M*2
ERYTHROCYTE [DISTWIDTH] IN BLOOD BY AUTOMATED COUNT: 13.1 % (ref 11.5–14.5)
GLUCOSE SERPL-MCNC: 78 MG/DL (ref 74–99)
HCT VFR BLD AUTO: 41.7 % (ref 41–52)
HDLC SERPL-MCNC: 47.8 MG/DL
HGB BLD-MCNC: 12.4 G/DL (ref 13.5–17.5)
LDLC SERPL CALC-MCNC: 65 MG/DL
MCH RBC QN AUTO: 27.5 PG (ref 26–34)
MCHC RBC AUTO-ENTMCNC: 29.7 G/DL (ref 32–36)
MCV RBC AUTO: 93 FL (ref 80–100)
NON HDL CHOLESTEROL: 76 MG/DL (ref 0–149)
NRBC BLD-RTO: 0 /100 WBCS (ref 0–0)
PLATELET # BLD AUTO: 217 X10*3/UL (ref 150–450)
POC APPEARANCE, URINE: CLEAR
POC BILIRUBIN, URINE: NEGATIVE
POC BLOOD, URINE: NEGATIVE
POC COLOR, URINE: YELLOW
POC GLUCOSE, URINE: NEGATIVE MG/DL
POC KETONES, URINE: NEGATIVE MG/DL
POC LEUKOCYTES, URINE: NEGATIVE
POC NITRITE,URINE: NEGATIVE
POC PH, URINE: 5 PH
POC PROTEIN, URINE: NEGATIVE MG/DL
POC SPECIFIC GRAVITY, URINE: 1.01
POC UROBILINOGEN, URINE: 0.2 EU/DL
POTASSIUM SERPL-SCNC: 4.3 MMOL/L (ref 3.5–5.3)
PROT SERPL-MCNC: 7.8 G/DL (ref 6.4–8.2)
PSA SERPL-MCNC: 2.77 NG/ML
RBC # BLD AUTO: 4.51 X10*6/UL (ref 4.5–5.9)
SODIUM SERPL-SCNC: 139 MMOL/L (ref 136–145)
TRIGL SERPL-MCNC: 54 MG/DL (ref 0–149)
TSH SERPL-ACNC: 0.98 MIU/L (ref 0.44–3.98)
VLDL: 11 MG/DL (ref 0–40)
WBC # BLD AUTO: 5.6 X10*3/UL (ref 4.4–11.3)

## 2024-06-27 PROCEDURE — 80061 LIPID PANEL: CPT

## 2024-06-27 PROCEDURE — 82306 VITAMIN D 25 HYDROXY: CPT

## 2024-06-27 PROCEDURE — G0103 PSA SCREENING: HCPCS

## 2024-06-27 PROCEDURE — 36415 COLL VENOUS BLD VENIPUNCTURE: CPT

## 2024-06-27 PROCEDURE — 84443 ASSAY THYROID STIM HORMONE: CPT

## 2024-06-27 PROCEDURE — 80053 COMPREHEN METABOLIC PANEL: CPT

## 2024-06-27 PROCEDURE — 85027 COMPLETE CBC AUTOMATED: CPT

## 2024-06-27 RX ORDER — LISINOPRIL 5 MG/1
5 TABLET ORAL DAILY
Qty: 90 TABLET | Refills: 3 | Status: SHIPPED | OUTPATIENT
Start: 2024-06-27

## 2024-06-27 RX ORDER — ACETAMINOPHEN 325 MG/1
325 TABLET ORAL EVERY 4 HOURS PRN
Qty: 30 TABLET | Refills: 2 | Status: SHIPPED | OUTPATIENT
Start: 2024-06-27

## 2024-06-27 RX ORDER — MINERAL OIL
180 ENEMA (ML) RECTAL DAILY
Qty: 90 TABLET | Refills: 3 | Status: SHIPPED | OUTPATIENT
Start: 2024-06-27

## 2024-06-27 RX ORDER — ALBUTEROL SULFATE 90 UG/1
2 AEROSOL, METERED RESPIRATORY (INHALATION) EVERY 6 HOURS PRN
Qty: 18 G | Refills: 2 | Status: SHIPPED | OUTPATIENT
Start: 2024-06-27

## 2024-06-27 RX ORDER — ATORVASTATIN CALCIUM 20 MG/1
20 TABLET, FILM COATED ORAL DAILY
Qty: 90 TABLET | Refills: 3 | Status: SHIPPED | OUTPATIENT
Start: 2024-06-27

## 2024-06-27 RX ORDER — LISINOPRIL 5 MG/1
5 TABLET ORAL DAILY
COMMUNITY
End: 2024-06-27 | Stop reason: SDUPTHER

## 2024-06-27 RX ORDER — DOCUSATE SODIUM 100 MG/1
100 CAPSULE, LIQUID FILLED ORAL DAILY
Qty: 60 CAPSULE | Refills: 3 | Status: SHIPPED | OUTPATIENT
Start: 2024-06-27

## 2024-06-27 RX ORDER — FUROSEMIDE 40 MG/1
20 TABLET ORAL DAILY
Qty: 45 TABLET | Refills: 2 | Status: SHIPPED | OUTPATIENT
Start: 2024-06-27

## 2024-06-27 RX ORDER — POTASSIUM CHLORIDE 750 MG/1
10 TABLET, FILM COATED, EXTENDED RELEASE ORAL DAILY
Qty: 90 TABLET | Refills: 3 | Status: SHIPPED | OUTPATIENT
Start: 2024-06-27

## 2024-06-27 RX ORDER — CARBIDOPA AND LEVODOPA 25; 100 MG/1; MG/1
1 TABLET ORAL 3 TIMES DAILY
Qty: 90 TABLET | Refills: 0 | Status: SHIPPED | OUTPATIENT
Start: 2024-06-27

## 2024-06-27 NOTE — ASSESSMENT & PLAN NOTE
Senokot 8.6 mg BID  Refilled Docusate  Encourage pt to increase water intake  Recommended a diet high in fiber and vegetables

## 2024-06-27 NOTE — PROGRESS NOTES
Subjective   Chief complaint: Shashi Gutierrez is a 78 y.o. male who presents for Bloated (Pt c/o abdominal tightness, legs hurt and medication refills. ).    HPI:  HPI  Pt reports he has been feeling muscle tightness in adomen and legs. Son reports he has been constipated lately    Objective   /74   Pulse 61   Resp 12   SpO2 94%   Physical Exam  Vitals reviewed.   Constitutional:       General: He is not in acute distress.     Appearance: Normal appearance. He is not ill-appearing, toxic-appearing or diaphoretic.   HENT:      Head: Normocephalic and atraumatic.   Cardiovascular:      Rate and Rhythm: Normal rate and regular rhythm.      Pulses: Normal pulses.      Heart sounds: Normal heart sounds.   Pulmonary:      Effort: Pulmonary effort is normal.      Breath sounds: Normal breath sounds.   Abdominal:      General: There is distension.      Palpations: Abdomen is soft.   Musculoskeletal:      Cervical back: Normal range of motion and neck supple. No rigidity.      Right lower leg: Edema present.      Left lower leg: Edema present.   Skin:     General: Skin is dry.      Capillary Refill: Capillary refill takes less than 2 seconds.   Neurological:      General: No focal deficit present.      Mental Status: He is alert and oriented to person, place, and time.      Gait: Gait abnormal.   Psychiatric:         Mood and Affect: Mood normal.         Behavior: Behavior normal. Behavior is cooperative.         Thought Content: Thought content normal.         Judgment: Judgment normal.         I have reviewed and reconciled the medication list with the patient today.   Current Outpatient Medications:     acetaminophen (Tylenol) 325 mg tablet, Take 1 tablet (325 mg) by mouth every 4 hours if needed., Disp: , Rfl:     albuterol (ProAir HFA) 90 mcg/actuation inhaler, Inhale 2 puffs every 6 hours if needed for shortness of breath or wheezing., Disp: 18 g, Rfl: 2    atorvastatin (Lipitor) 20 mg tablet, Take 1 tablet (20  mg) by mouth once daily., Disp: 90 tablet, Rfl: 3    carbidopa-levodopa (Sinemet)  mg tablet, Take 1 tablet by mouth 3 times a day., Disp: 90 tablet, Rfl: 0    docusate sodium (Colace) 100 mg capsule, Take 1 capsule (100 mg) by mouth once daily., Disp: , Rfl:     fluticasone (Flonase) 50 mcg/actuation nasal spray, Administer 2 sprays into each nostril once daily as needed for rhinitis. Shake liquid before use, Disp: , Rfl:     furosemide (Lasix) 40 mg tablet, Take 0.5 tablets (20 mg) by mouth once daily., Disp: 45 tablet, Rfl: 2    potassium chloride CR 10 mEq ER tablet, Take 1 tablet (10 mEq) by mouth once daily., Disp: 90 tablet, Rfl: 3    rivaroxaban (Xarelto) 20 mg tablet, Take 1 tablet (20 mg) by mouth once daily., Disp: 90 tablet, Rfl: 3    fexofenadine (Allegra) 180 mg tablet, Take 1 tablet (180 mg) by mouth once daily as needed., Disp: , Rfl:     folic acid/multivit-min/lutein (CENTRUM SILVER ORAL), Take 1 tablet by mouth once daily., Disp: , Rfl:     lisinopril 5 mg tablet, Take 1 tablet (5 mg) by mouth once daily., Disp: , Rfl:     sennosides (Senokot) 8.6 mg tablet, Take 1 tablet (8.6 mg) by mouth 2 times a day. (Patient not taking: Reported on 6/27/2024), Disp: 180 tablet, Rfl: 3    sildenafil (Viagra) 50 mg tablet, Take 1 tablet (50 mg) by mouth once daily as needed for erectile dysfunction. One hour before needed, Disp: , Rfl:      Imaging:  No results found.     Labs reviewed:    Lab Results   Component Value Date    WBC 4.4 12/14/2023    HGB 11.6 (L) 12/14/2023    HCT 37.2 (L) 12/14/2023     12/14/2023    CHOL 205 (H) 12/05/2022    TRIG 92 12/05/2022    HDL 51.4 12/05/2022    ALT 8 (L) 12/11/2023    AST 26 12/11/2023     12/14/2023    K 4.1 12/14/2023    CL 99 12/14/2023    CREATININE 1.06 12/14/2023    BUN 19 12/14/2023    CO2 29 12/14/2023    TSH 0.35 (L) 12/11/2023    PSA CANCELED 03/20/2023    INR 2.2 (H) 12/11/2023    HGBA1C 4.8 12/05/2022       Assessment/Plan   Problem List  Items Addressed This Visit       Asthma ([facility])     Continue albuterol sulfate          CHF (congestive heart failure) (Multi)     C/w Lasix         Constipation     Senokot 8.6 mg BID  Refilled Docusate  Encourage pt to increase water intake  Recommended a diet high in fiber and vegetables         Hyperlipidemia     Atorvastain 20mg daily         Relevant Orders    Lipid Panel    Hypertension, essential      Remains on spirolactone, losartan, furosemide  BP stable.   Will monitor          Relevant Orders    CBC    Comprehensive Metabolic Panel    Lipid Panel    Parkinson's disease (Multi)     Referral given for home PT/OT  Carbidopa levodopa  Monitor           AF (atrial fibrillation) (Multi) - Primary (Chronic)     Heart rate controlled  Anticoagulant Xarelto 20mg  Monitor for bleeding         Anemia    Relevant Orders    CBC     Other Visit Diagnoses       Hypothyroidism, unspecified type        Benign essential hypertension        Primary hypertension        Elevated LDL cholesterol level        Encounter for annual wellness visit (AWV) in Medicare patient        Relevant Orders    CBC    Comprehensive Metabolic Panel    Lipid Panel    Thyroid Stimulating Hormone    Vitamin D 25-Hydroxy,Total (for eval of Vitamin D levels)    Prostate Specific Antigen, Screen    POCT UA (nonautomated) manually resulted    ECG 12 lead (Clinic Performed)    Screening PSA (prostate specific antigen)        Relevant Orders    Prostate Specific Antigen, Screen    Body mass index (BMI) 32.0-32.9, adult        Relevant Orders    Vitamin D 25-Hydroxy,Total (for eval of Vitamin D levels)            Continue current medications as listed  Follow up in 3mo

## 2024-07-11 ENCOUNTER — APPOINTMENT (OUTPATIENT)
Dept: PRIMARY CARE | Facility: CLINIC | Age: 79
End: 2024-07-11
Payer: COMMERCIAL

## 2024-07-11 DIAGNOSIS — R60.0 EDEMA OF LEFT LOWER LEG: ICD-10-CM

## 2024-07-11 DIAGNOSIS — G89.29 CHRONIC PAIN OF BOTH KNEES: ICD-10-CM

## 2024-07-11 DIAGNOSIS — I10 HYPERTENSION, ESSENTIAL: ICD-10-CM

## 2024-07-11 DIAGNOSIS — M25.562 ACUTE PAIN OF LEFT KNEE: ICD-10-CM

## 2024-07-11 DIAGNOSIS — R60.0 LEG EDEMA: ICD-10-CM

## 2024-07-11 DIAGNOSIS — E55.9 VITAMIN D DEFICIENCY: ICD-10-CM

## 2024-07-11 DIAGNOSIS — I50.9 CHRONIC CONGESTIVE HEART FAILURE, UNSPECIFIED HEART FAILURE TYPE (MULTI): ICD-10-CM

## 2024-07-11 DIAGNOSIS — G20.A1 PARKINSON'S DISEASE WITHOUT DYSKINESIA, UNSPECIFIED WHETHER MANIFESTATIONS FLUCTUATE (MULTI): ICD-10-CM

## 2024-07-11 DIAGNOSIS — E78.5 HYPERLIPIDEMIA, UNSPECIFIED HYPERLIPIDEMIA TYPE: ICD-10-CM

## 2024-07-11 DIAGNOSIS — R10.9 ABDOMINAL DISCOMFORT: ICD-10-CM

## 2024-07-11 DIAGNOSIS — M25.561 CHRONIC PAIN OF BOTH KNEES: ICD-10-CM

## 2024-07-11 DIAGNOSIS — M25.562 CHRONIC PAIN OF BOTH KNEES: ICD-10-CM

## 2024-07-11 DIAGNOSIS — T78.40XD ALLERGY, SUBSEQUENT ENCOUNTER: Primary | ICD-10-CM

## 2024-07-11 DIAGNOSIS — I48.91 ATRIAL FIBRILLATION, UNSPECIFIED TYPE (MULTI): Chronic | ICD-10-CM

## 2024-07-11 DIAGNOSIS — I73.9 INTERMITTENT CLAUDICATION (CMS-HCC): ICD-10-CM

## 2024-07-11 PROCEDURE — 99214 OFFICE O/P EST MOD 30 MIN: CPT | Performed by: INTERNAL MEDICINE

## 2024-07-11 PROCEDURE — 1159F MED LIST DOCD IN RCRD: CPT | Performed by: INTERNAL MEDICINE

## 2024-07-11 ASSESSMENT — ENCOUNTER SYMPTOMS
OCCASIONAL FEELINGS OF UNSTEADINESS: 0
LOSS OF SENSATION IN FEET: 0
DEPRESSION: 0

## 2024-07-11 NOTE — PROGRESS NOTES
"Subjective   Chief complaint: Shashi Gutierrez is a 78 y.o. male who presents for Annual Exam (Pt c/o having abdominal pain and left leg pain, ).    HPI:  Pt endorses L leg pain for a couple weeks. Swollen below the knee.     Pt endorses stomach pain. Describes it as cramping for 1 week - in the middle of his abdomen. Says it feels \"real tight\". Denies diarrhea and constipation. Denies nausea and vomiting. Mostly hurts when in the bed sleeping at night.         Objective   There were no vitals taken for this visit.  Physical Exam    I have reviewed and reconciled the medication list with the patient today.   Current Outpatient Medications:     acetaminophen (Tylenol) 325 mg tablet, Take 1 tablet (325 mg) by mouth every 4 hours if needed for fever (temp greater than 38.0 C) or headaches., Disp: 30 tablet, Rfl: 2    albuterol (ProAir HFA) 90 mcg/actuation inhaler, Inhale 2 puffs every 6 hours if needed for shortness of breath or wheezing., Disp: 18 g, Rfl: 2    atorvastatin (Lipitor) 20 mg tablet, Take 1 tablet (20 mg) by mouth once daily., Disp: 90 tablet, Rfl: 3    carbidopa-levodopa (Sinemet)  mg tablet, Take 1 tablet by mouth 3 times a day., Disp: 90 tablet, Rfl: 0    docusate sodium (Colace) 100 mg capsule, Take 1 capsule (100 mg) by mouth once daily., Disp: 60 capsule, Rfl: 3    fexofenadine (Allegra) 180 mg tablet, Take 1 tablet (180 mg) by mouth once daily., Disp: 90 tablet, Rfl: 3    fluticasone (Flonase) 50 mcg/actuation nasal spray, Administer 2 sprays into each nostril once daily as needed for rhinitis. Shake liquid before use, Disp: , Rfl:     folic acid/multivit-min/lutein (CENTRUM SILVER ORAL), Take 1 tablet by mouth once daily., Disp: , Rfl:     furosemide (Lasix) 40 mg tablet, Take 0.5 tablets (20 mg) by mouth once daily., Disp: 45 tablet, Rfl: 2    lisinopril 5 mg tablet, Take 1 tablet (5 mg) by mouth once daily., Disp: 90 tablet, Rfl: 3    potassium chloride CR 10 mEq ER tablet, Take 1 tablet (10 " mEq) by mouth once daily., Disp: 90 tablet, Rfl: 3    rivaroxaban (Xarelto) 20 mg tablet, Take 1 tablet (20 mg) by mouth once daily., Disp: 90 tablet, Rfl: 3    sennosides (Senokot) 8.6 mg tablet, Take 1 tablet (8.6 mg) by mouth 2 times a day. (Patient not taking: Reported on 6/27/2024), Disp: 180 tablet, Rfl: 3    sildenafil (Viagra) 50 mg tablet, Take 1 tablet (50 mg) by mouth once daily as needed for erectile dysfunction. One hour before needed, Disp: , Rfl:      Imaging:  ECG 12 lead (Clinic Performed)    Result Date: 6/27/2024  Sinus rhythm nonspecific ST and T changes       Labs reviewed:    Lab Results   Component Value Date    WBC 5.6 06/27/2024    HGB 12.4 (L) 06/27/2024    HCT 41.7 06/27/2024     06/27/2024    CHOL 124 06/27/2024    TRIG 54 06/27/2024    HDL 47.8 06/27/2024    ALT 11 06/27/2024    AST 22 06/27/2024     06/27/2024    K 4.3 06/27/2024     06/27/2024    CREATININE 0.98 06/27/2024    BUN 17 06/27/2024    CO2 26 06/27/2024    TSH 0.98 06/27/2024    PSA CANCELED 03/20/2023    INR 2.2 (H) 12/11/2023    HGBA1C 4.8 12/05/2022       Assessment/Plan   Problem List Items Addressed This Visit          Allergies and Adverse Reactions    Allergies - Primary       Cardiac and Vasculature    AF (atrial fibrillation) (Multi) (Chronic)    CHF (congestive heart failure) (Multi)    Hyperlipidemia    Hypertension, essential       Endocrine/Metabolic    Vitamin D deficiency     Add vitamin D supplement 50,000 1x/week             Gastrointestinal and Abdominal    Abdominal discomfort       Musculoskeletal and Injuries    Left knee pain     Voltaren gel             Neuro    Parkinson's disease (Multi)       Symptoms and Signs    Edema of left lower leg     Pt instructed to double the dose of lasix when lower extremity swelling present    Order compression stockings            Continue current medications as listed  Follow up in 1 month

## 2024-07-11 NOTE — ASSESSMENT & PLAN NOTE
Pt instructed to double the dose of lasix when lower extremity swelling present    Order compression stockings

## 2024-07-12 RX ORDER — ERGOCALCIFEROL 1.25 MG/1
50000 CAPSULE ORAL
Qty: 6 CAPSULE | Refills: 0 | Status: SHIPPED | OUTPATIENT
Start: 2024-07-14 | End: 2024-08-25

## 2024-07-12 RX ORDER — DICLOFENAC SODIUM 10 MG/G
4 GEL TOPICAL 4 TIMES DAILY
Qty: 100 G | Refills: 1 | Status: SHIPPED | OUTPATIENT
Start: 2024-07-12

## 2024-10-07 ENCOUNTER — APPOINTMENT (OUTPATIENT)
Dept: CARDIOLOGY | Facility: HOSPITAL | Age: 79
End: 2024-10-07
Payer: COMMERCIAL

## 2024-10-07 ENCOUNTER — APPOINTMENT (OUTPATIENT)
Dept: RADIOLOGY | Facility: HOSPITAL | Age: 79
End: 2024-10-07
Payer: COMMERCIAL

## 2024-10-07 ENCOUNTER — HOSPITAL ENCOUNTER (OUTPATIENT)
Facility: HOSPITAL | Age: 79
Setting detail: OBSERVATION
Discharge: NURSING, REHAB OR LONG-TERM CARE | End: 2024-10-11
Attending: EMERGENCY MEDICINE | Admitting: INTERNAL MEDICINE
Payer: COMMERCIAL

## 2024-10-07 DIAGNOSIS — R41.0 CONFUSION: Primary | ICD-10-CM

## 2024-10-07 DIAGNOSIS — I50.33 ACUTE ON CHRONIC DIASTOLIC CONGESTIVE HEART FAILURE: ICD-10-CM

## 2024-10-07 DIAGNOSIS — G20.A1 PARKINSON'S DISEASE (MULTI): ICD-10-CM

## 2024-10-07 DIAGNOSIS — G20.A2 PARKINSON'S DISEASE WITH FLUCTUATING MANIFESTATIONS, UNSPECIFIED WHETHER DYSKINESIA PRESENT: ICD-10-CM

## 2024-10-07 LAB
ALBUMIN SERPL BCP-MCNC: 4 G/DL (ref 3.4–5)
ALP SERPL-CCNC: 61 U/L (ref 33–136)
ALT SERPL W P-5'-P-CCNC: 6 U/L (ref 10–52)
ANION GAP BLDV CALCULATED.4IONS-SCNC: 12 MMOL/L (ref 10–25)
ANION GAP SERPL CALC-SCNC: 11 MMOL/L (ref 10–20)
APPEARANCE UR: CLEAR
AST SERPL W P-5'-P-CCNC: 66 U/L (ref 9–39)
ATRIAL RATE: 75 BPM
BASE EXCESS BLDV CALC-SCNC: 1.9 MMOL/L (ref -2–3)
BASOPHILS # BLD AUTO: 0.02 X10*3/UL (ref 0–0.1)
BASOPHILS NFR BLD AUTO: 0.3 %
BILIRUB DIRECT SERPL-MCNC: 0.3 MG/DL (ref 0–0.3)
BILIRUB SERPL-MCNC: 1.3 MG/DL (ref 0–1.2)
BILIRUB UR STRIP.AUTO-MCNC: NEGATIVE MG/DL
BNP SERPL-MCNC: 29 PG/ML (ref 0–99)
BODY TEMPERATURE: 37 DEGREES CELSIUS
BUN SERPL-MCNC: 15 MG/DL (ref 6–23)
CA-I BLDV-SCNC: 1.16 MMOL/L (ref 1.1–1.33)
CALCIUM SERPL-MCNC: 8.9 MG/DL (ref 8.6–10.3)
CARDIAC TROPONIN I PNL SERPL HS: 13 NG/L (ref 0–20)
CHLORIDE BLDV-SCNC: 103 MMOL/L (ref 98–107)
CHLORIDE SERPL-SCNC: 105 MMOL/L (ref 98–107)
CO2 SERPL-SCNC: 28 MMOL/L (ref 21–32)
COLOR UR: YELLOW
CREAT SERPL-MCNC: 1.15 MG/DL (ref 0.5–1.3)
EGFRCR SERPLBLD CKD-EPI 2021: 65 ML/MIN/1.73M*2
EOSINOPHIL # BLD AUTO: 0.06 X10*3/UL (ref 0–0.4)
EOSINOPHIL NFR BLD AUTO: 0.9 %
ERYTHROCYTE [DISTWIDTH] IN BLOOD BY AUTOMATED COUNT: 12.5 % (ref 11.5–14.5)
GLUCOSE BLDV-MCNC: 112 MG/DL (ref 74–99)
GLUCOSE SERPL-MCNC: 106 MG/DL (ref 74–99)
GLUCOSE UR STRIP.AUTO-MCNC: NORMAL MG/DL
HCO3 BLDV-SCNC: 26.6 MMOL/L (ref 22–26)
HCT VFR BLD AUTO: 38.4 % (ref 41–52)
HCT VFR BLD EST: 39 % (ref 41–52)
HGB BLD-MCNC: 12.1 G/DL (ref 13.5–17.5)
HGB BLDV-MCNC: 13 G/DL (ref 13.5–17.5)
IMM GRANULOCYTES # BLD AUTO: 0.03 X10*3/UL (ref 0–0.5)
IMM GRANULOCYTES NFR BLD AUTO: 0.4 % (ref 0–0.9)
INHALED O2 CONCENTRATION: 21 %
KETONES UR STRIP.AUTO-MCNC: NEGATIVE MG/DL
LACTATE BLDV-SCNC: 1.5 MMOL/L (ref 0.4–2)
LEUKOCYTE ESTERASE UR QL STRIP.AUTO: NEGATIVE
LYMPHOCYTES # BLD AUTO: 0.86 X10*3/UL (ref 0.8–3)
LYMPHOCYTES NFR BLD AUTO: 12.4 %
MAGNESIUM SERPL-MCNC: 1.9 MG/DL (ref 1.6–2.4)
MCH RBC QN AUTO: 27.7 PG (ref 26–34)
MCHC RBC AUTO-ENTMCNC: 31.5 G/DL (ref 32–36)
MCV RBC AUTO: 88 FL (ref 80–100)
MONOCYTES # BLD AUTO: 0.73 X10*3/UL (ref 0.05–0.8)
MONOCYTES NFR BLD AUTO: 10.5 %
MUCOUS THREADS #/AREA URNS AUTO: NORMAL /LPF
NEUTROPHILS # BLD AUTO: 5.26 X10*3/UL (ref 1.6–5.5)
NEUTROPHILS NFR BLD AUTO: 75.5 %
NITRITE UR QL STRIP.AUTO: NEGATIVE
NRBC BLD-RTO: 0 /100 WBCS (ref 0–0)
OXYHGB MFR BLDV: 79.1 % (ref 45–75)
P AXIS: 42 DEGREES
P OFFSET: 180 MS
P ONSET: 127 MS
PCO2 BLDV: 41 MM HG (ref 41–51)
PH BLDV: 7.42 PH (ref 7.33–7.43)
PH UR STRIP.AUTO: 5.5 [PH]
PLATELET # BLD AUTO: 226 X10*3/UL (ref 150–450)
PO2 BLDV: 49 MM HG (ref 35–45)
POTASSIUM BLDV-SCNC: 4 MMOL/L (ref 3.5–5.3)
POTASSIUM SERPL-SCNC: 4 MMOL/L (ref 3.5–5.3)
PR INTERVAL: 170 MS
PROT SERPL-MCNC: 7.2 G/DL (ref 6.4–8.2)
PROT UR STRIP.AUTO-MCNC: NORMAL MG/DL
Q ONSET: 212 MS
QRS COUNT: 12 BEATS
QRS DURATION: 90 MS
QT INTERVAL: 394 MS
QTC CALCULATION(BAZETT): 439 MS
QTC FREDERICIA: 424 MS
R AXIS: -47 DEGREES
RBC # BLD AUTO: 4.37 X10*6/UL (ref 4.5–5.9)
RBC # UR STRIP.AUTO: NEGATIVE /UL
RBC #/AREA URNS AUTO: NORMAL /HPF
SAO2 % BLDV: 81 % (ref 45–75)
SODIUM BLDV-SCNC: 138 MMOL/L (ref 136–145)
SODIUM SERPL-SCNC: 140 MMOL/L (ref 136–145)
SP GR UR STRIP.AUTO: 1.02
SQUAMOUS #/AREA URNS AUTO: NORMAL /HPF
T AXIS: -21 DEGREES
T OFFSET: 409 MS
TSH SERPL-ACNC: 0.89 MIU/L (ref 0.44–3.98)
UROBILINOGEN UR STRIP.AUTO-MCNC: NORMAL MG/DL
VENTRICULAR RATE: 75 BPM
WBC # BLD AUTO: 7 X10*3/UL (ref 4.4–11.3)
WBC #/AREA URNS AUTO: NORMAL /HPF

## 2024-10-07 PROCEDURE — 85018 HEMOGLOBIN: CPT | Mod: 59 | Performed by: EMERGENCY MEDICINE

## 2024-10-07 PROCEDURE — 81001 URINALYSIS AUTO W/SCOPE: CPT | Performed by: EMERGENCY MEDICINE

## 2024-10-07 PROCEDURE — 70450 CT HEAD/BRAIN W/O DYE: CPT | Performed by: STUDENT IN AN ORGANIZED HEALTH CARE EDUCATION/TRAINING PROGRAM

## 2024-10-07 PROCEDURE — 93005 ELECTROCARDIOGRAM TRACING: CPT

## 2024-10-07 PROCEDURE — 82435 ASSAY OF BLOOD CHLORIDE: CPT | Performed by: EMERGENCY MEDICINE

## 2024-10-07 PROCEDURE — 84484 ASSAY OF TROPONIN QUANT: CPT | Performed by: EMERGENCY MEDICINE

## 2024-10-07 PROCEDURE — 71045 X-RAY EXAM CHEST 1 VIEW: CPT | Mod: FOREIGN READ | Performed by: RADIOLOGY

## 2024-10-07 PROCEDURE — 99285 EMERGENCY DEPT VISIT HI MDM: CPT | Mod: 25

## 2024-10-07 PROCEDURE — 70450 CT HEAD/BRAIN W/O DYE: CPT

## 2024-10-07 PROCEDURE — 71045 X-RAY EXAM CHEST 1 VIEW: CPT

## 2024-10-07 PROCEDURE — 2500000004 HC RX 250 GENERAL PHARMACY W/ HCPCS (ALT 636 FOR OP/ED): Performed by: EMERGENCY MEDICINE

## 2024-10-07 PROCEDURE — 80051 ELECTROLYTE PANEL: CPT | Performed by: EMERGENCY MEDICINE

## 2024-10-07 PROCEDURE — 93970 EXTREMITY STUDY: CPT

## 2024-10-07 PROCEDURE — 84075 ASSAY ALKALINE PHOSPHATASE: CPT | Performed by: EMERGENCY MEDICINE

## 2024-10-07 PROCEDURE — G0378 HOSPITAL OBSERVATION PER HR: HCPCS

## 2024-10-07 PROCEDURE — 85025 COMPLETE CBC W/AUTO DIFF WBC: CPT | Performed by: EMERGENCY MEDICINE

## 2024-10-07 PROCEDURE — 93971 EXTREMITY STUDY: CPT | Performed by: STUDENT IN AN ORGANIZED HEALTH CARE EDUCATION/TRAINING PROGRAM

## 2024-10-07 PROCEDURE — 96374 THER/PROPH/DIAG INJ IV PUSH: CPT

## 2024-10-07 PROCEDURE — 83735 ASSAY OF MAGNESIUM: CPT | Performed by: EMERGENCY MEDICINE

## 2024-10-07 PROCEDURE — 84443 ASSAY THYROID STIM HORMONE: CPT | Performed by: EMERGENCY MEDICINE

## 2024-10-07 PROCEDURE — 36415 COLL VENOUS BLD VENIPUNCTURE: CPT | Performed by: EMERGENCY MEDICINE

## 2024-10-07 PROCEDURE — 83880 ASSAY OF NATRIURETIC PEPTIDE: CPT | Performed by: EMERGENCY MEDICINE

## 2024-10-07 PROCEDURE — 84132 ASSAY OF SERUM POTASSIUM: CPT | Mod: 59 | Performed by: EMERGENCY MEDICINE

## 2024-10-07 RX ORDER — POLYETHYLENE GLYCOL 3350 17 G/17G
17 POWDER, FOR SOLUTION ORAL DAILY
Status: DISCONTINUED | OUTPATIENT
Start: 2024-10-08 | End: 2024-10-08

## 2024-10-07 RX ORDER — SENNOSIDES 8.6 MG/1
1 TABLET ORAL 2 TIMES DAILY
Status: DISCONTINUED | OUTPATIENT
Start: 2024-10-07 | End: 2024-10-11 | Stop reason: HOSPADM

## 2024-10-07 RX ORDER — ACETAMINOPHEN 650 MG/1
650 SUPPOSITORY RECTAL EVERY 4 HOURS PRN
Status: DISCONTINUED | OUTPATIENT
Start: 2024-10-07 | End: 2024-10-11 | Stop reason: HOSPADM

## 2024-10-07 RX ORDER — CARBIDOPA AND LEVODOPA 25; 100 MG/1; MG/1
1 TABLET ORAL 3 TIMES DAILY
Status: DISCONTINUED | OUTPATIENT
Start: 2024-10-07 | End: 2024-10-11 | Stop reason: HOSPADM

## 2024-10-07 RX ORDER — FUROSEMIDE 10 MG/ML
40 INJECTION INTRAMUSCULAR; INTRAVENOUS ONCE
Status: COMPLETED | OUTPATIENT
Start: 2024-10-07 | End: 2024-10-07

## 2024-10-07 RX ORDER — ONDANSETRON HYDROCHLORIDE 2 MG/ML
4 INJECTION, SOLUTION INTRAVENOUS EVERY 8 HOURS PRN
Status: DISCONTINUED | OUTPATIENT
Start: 2024-10-07 | End: 2024-10-11 | Stop reason: HOSPADM

## 2024-10-07 RX ORDER — ACETAMINOPHEN 325 MG/1
975 TABLET ORAL EVERY 8 HOURS PRN
Status: DISCONTINUED | OUTPATIENT
Start: 2024-10-07 | End: 2024-10-11 | Stop reason: HOSPADM

## 2024-10-07 RX ORDER — ACETAMINOPHEN 160 MG/5ML
650 SOLUTION ORAL EVERY 6 HOURS PRN
Status: DISCONTINUED | OUTPATIENT
Start: 2024-10-07 | End: 2024-10-11 | Stop reason: HOSPADM

## 2024-10-07 RX ORDER — ATORVASTATIN CALCIUM 20 MG/1
20 TABLET, FILM COATED ORAL NIGHTLY
Status: DISCONTINUED | OUTPATIENT
Start: 2024-10-07 | End: 2024-10-11 | Stop reason: HOSPADM

## 2024-10-07 RX ORDER — ONDANSETRON 4 MG/1
4 TABLET, FILM COATED ORAL EVERY 8 HOURS PRN
Status: DISCONTINUED | OUTPATIENT
Start: 2024-10-07 | End: 2024-10-11 | Stop reason: HOSPADM

## 2024-10-07 RX ORDER — TALC
6 POWDER (GRAM) TOPICAL NIGHTLY
Status: DISCONTINUED | OUTPATIENT
Start: 2024-10-07 | End: 2024-10-11 | Stop reason: HOSPADM

## 2024-10-07 ASSESSMENT — PAIN - FUNCTIONAL ASSESSMENT: PAIN_FUNCTIONAL_ASSESSMENT: 0-10

## 2024-10-07 ASSESSMENT — PAIN SCALES - GENERAL: PAINLEVEL_OUTOF10: 0 - NO PAIN

## 2024-10-07 ASSESSMENT — COLUMBIA-SUICIDE SEVERITY RATING SCALE - C-SSRS
2. HAVE YOU ACTUALLY HAD ANY THOUGHTS OF KILLING YOURSELF?: NO
6. HAVE YOU EVER DONE ANYTHING, STARTED TO DO ANYTHING, OR PREPARED TO DO ANYTHING TO END YOUR LIFE?: NO
1. IN THE PAST MONTH, HAVE YOU WISHED YOU WERE DEAD OR WISHED YOU COULD GO TO SLEEP AND NOT WAKE UP?: NO

## 2024-10-07 NOTE — ED TRIAGE NOTES
Pt is coming in because he hasn't been taking his parkinson's meds for at least 2 days. Per squad, son states that when he isn't on his meds he gets more confused and starts having generalized weakness. Prior to coming in son gave him his dose of Parkinson's meds. Denies any SOB or chest pain.

## 2024-10-08 LAB
GLUCOSE BLD MANUAL STRIP-MCNC: 107 MG/DL (ref 74–99)
HOLD SPECIMEN: NORMAL

## 2024-10-08 PROCEDURE — 82947 ASSAY GLUCOSE BLOOD QUANT: CPT

## 2024-10-08 PROCEDURE — 2500000002 HC RX 250 W HCPCS SELF ADMINISTERED DRUGS (ALT 637 FOR MEDICARE OP, ALT 636 FOR OP/ED): Mod: MUE | Performed by: INTERNAL MEDICINE

## 2024-10-08 PROCEDURE — 2500000004 HC RX 250 GENERAL PHARMACY W/ HCPCS (ALT 636 FOR OP/ED): Performed by: INTERNAL MEDICINE

## 2024-10-08 PROCEDURE — G0378 HOSPITAL OBSERVATION PER HR: HCPCS

## 2024-10-08 PROCEDURE — 2500000001 HC RX 250 WO HCPCS SELF ADMINISTERED DRUGS (ALT 637 FOR MEDICARE OP): Performed by: INTERNAL MEDICINE

## 2024-10-08 PROCEDURE — 97161 PT EVAL LOW COMPLEX 20 MIN: CPT | Mod: GP

## 2024-10-08 PROCEDURE — 99222 1ST HOSP IP/OBS MODERATE 55: CPT | Performed by: INTERNAL MEDICINE

## 2024-10-08 PROCEDURE — 97116 GAIT TRAINING THERAPY: CPT | Mod: GP

## 2024-10-08 RX ORDER — POLYETHYLENE GLYCOL 3350 17 G/17G
17 POWDER, FOR SOLUTION ORAL 2 TIMES DAILY
Status: DISCONTINUED | OUTPATIENT
Start: 2024-10-08 | End: 2024-10-11 | Stop reason: HOSPADM

## 2024-10-08 SDOH — SOCIAL STABILITY: SOCIAL INSECURITY: DO YOU FEEL ANYONE HAS EXPLOITED OR TAKEN ADVANTAGE OF YOU FINANCIALLY OR OF YOUR PERSONAL PROPERTY?: NO

## 2024-10-08 SDOH — SOCIAL STABILITY: SOCIAL INSECURITY: HAVE YOU HAD THOUGHTS OF HARMING ANYONE ELSE?: NO

## 2024-10-08 SDOH — SOCIAL STABILITY: SOCIAL INSECURITY: DO YOU FEEL UNSAFE GOING BACK TO THE PLACE WHERE YOU ARE LIVING?: NO

## 2024-10-08 SDOH — SOCIAL STABILITY: SOCIAL INSECURITY: ARE YOU OR HAVE YOU BEEN THREATENED OR ABUSED PHYSICALLY, EMOTIONALLY, OR SEXUALLY BY ANYONE?: NO

## 2024-10-08 SDOH — SOCIAL STABILITY: SOCIAL INSECURITY: DOES ANYONE TRY TO KEEP YOU FROM HAVING/CONTACTING OTHER FRIENDS OR DOING THINGS OUTSIDE YOUR HOME?: NO

## 2024-10-08 SDOH — SOCIAL STABILITY: SOCIAL INSECURITY: WERE YOU ABLE TO COMPLETE ALL THE BEHAVIORAL HEALTH SCREENINGS?: YES

## 2024-10-08 SDOH — SOCIAL STABILITY: SOCIAL INSECURITY: HAS ANYONE EVER THREATENED TO HURT YOUR FAMILY OR YOUR PETS?: NO

## 2024-10-08 SDOH — SOCIAL STABILITY: SOCIAL INSECURITY: ABUSE: ADULT

## 2024-10-08 SDOH — SOCIAL STABILITY: SOCIAL INSECURITY: HAVE YOU HAD ANY THOUGHTS OF HARMING ANYONE ELSE?: NO

## 2024-10-08 ASSESSMENT — ACTIVITIES OF DAILY LIVING (ADL)
GROOMING: NEEDS ASSISTANCE
FEEDING YOURSELF: NEEDS ASSISTANCE
HEARING - RIGHT EAR: FUNCTIONAL
BATHING_ASSISTANCE: SPONGE BATHING
TOILETING: NEEDS ASSISTANCE
LACK_OF_TRANSPORTATION: NO
WALKS IN HOME: NEEDS ASSISTANCE
PATIENT'S MEMORY ADEQUATE TO SAFELY COMPLETE DAILY ACTIVITIES?: YES
BATHING: NEEDS ASSISTANCE
JUDGMENT_ADEQUATE_SAFELY_COMPLETE_DAILY_ACTIVITIES: YES
ADL_ASSISTANCE: INDEPENDENT
ADEQUATE_TO_COMPLETE_ADL: YES
DRESSING YOURSELF: NEEDS ASSISTANCE
HEARING - LEFT EAR: FUNCTIONAL

## 2024-10-08 ASSESSMENT — PAIN SCALES - GENERAL
PAINLEVEL_OUTOF10: 0 - NO PAIN

## 2024-10-08 ASSESSMENT — ENCOUNTER SYMPTOMS
PSYCHIATRIC NEGATIVE: 1
CONSTITUTIONAL NEGATIVE: 1
RESPIRATORY NEGATIVE: 1
NEUROLOGICAL NEGATIVE: 1
CARDIOVASCULAR NEGATIVE: 1
GASTROINTESTINAL NEGATIVE: 1
MUSCULOSKELETAL NEGATIVE: 1

## 2024-10-08 ASSESSMENT — COGNITIVE AND FUNCTIONAL STATUS - GENERAL
PATIENT BASELINE BEDBOUND: NO
MOVING FROM LYING ON BACK TO SITTING ON SIDE OF FLAT BED WITH BEDRAILS: A LOT
HELP NEEDED FOR BATHING: A LOT
CLIMB 3 TO 5 STEPS WITH RAILING: A LOT
DRESSING REGULAR LOWER BODY CLOTHING: A LOT
PERSONAL GROOMING: A LOT
PERSONAL GROOMING: A LOT
CLIMB 3 TO 5 STEPS WITH RAILING: A LOT
MOVING TO AND FROM BED TO CHAIR: A LOT
DAILY ACTIVITIY SCORE: 12
TURNING FROM BACK TO SIDE WHILE IN FLAT BAD: A LITTLE
TURNING FROM BACK TO SIDE WHILE IN FLAT BAD: A LOT
MOBILITY SCORE: 15
DRESSING REGULAR UPPER BODY CLOTHING: A LOT
TOILETING: A LOT
MOVING FROM LYING ON BACK TO SITTING ON SIDE OF FLAT BED WITH BEDRAILS: A LITTLE
CLIMB 3 TO 5 STEPS WITH RAILING: TOTAL
STANDING UP FROM CHAIR USING ARMS: A LOT
WALKING IN HOSPITAL ROOM: A LOT
WALKING IN HOSPITAL ROOM: A LOT
DRESSING REGULAR UPPER BODY CLOTHING: A LOT
EATING MEALS: A LOT
MOVING FROM LYING ON BACK TO SITTING ON SIDE OF FLAT BED WITH BEDRAILS: A LOT
MOBILITY SCORE: 12
MOVING TO AND FROM BED TO CHAIR: A LITTLE
STANDING UP FROM CHAIR USING ARMS: A LITTLE
EATING MEALS: A LOT
DRESSING REGULAR LOWER BODY CLOTHING: A LOT
TURNING FROM BACK TO SIDE WHILE IN FLAT BAD: A LOT
DAILY ACTIVITIY SCORE: 12
MOVING TO AND FROM BED TO CHAIR: A LOT
STANDING UP FROM CHAIR USING ARMS: A LOT
TOILETING: A LOT
HELP NEEDED FOR BATHING: A LOT
MOBILITY SCORE: 12
WALKING IN HOSPITAL ROOM: A LOT

## 2024-10-08 ASSESSMENT — LIFESTYLE VARIABLES
HOW MANY STANDARD DRINKS CONTAINING ALCOHOL DO YOU HAVE ON A TYPICAL DAY: PATIENT DOES NOT DRINK
SKIP TO QUESTIONS 9-10: 1
HOW OFTEN DO YOU HAVE 6 OR MORE DRINKS ON ONE OCCASION: NEVER
HOW OFTEN DO YOU HAVE A DRINK CONTAINING ALCOHOL: NEVER
SUBSTANCE_ABUSE_PAST_12_MONTHS: NO
PRESCIPTION_ABUSE_PAST_12_MONTHS: NO
AUDIT-C TOTAL SCORE: 0
AUDIT-C TOTAL SCORE: 0

## 2024-10-08 ASSESSMENT — PATIENT HEALTH QUESTIONNAIRE - PHQ9
SUM OF ALL RESPONSES TO PHQ9 QUESTIONS 1 & 2: 0
2. FEELING DOWN, DEPRESSED OR HOPELESS: NOT AT ALL
1. LITTLE INTEREST OR PLEASURE IN DOING THINGS: NOT AT ALL

## 2024-10-08 ASSESSMENT — PAIN - FUNCTIONAL ASSESSMENT
PAIN_FUNCTIONAL_ASSESSMENT: 0-10
PAIN_FUNCTIONAL_ASSESSMENT: 0-10

## 2024-10-08 NOTE — PROGRESS NOTES
10/08/24 0753   [facility] Disability Status   Are you deaf or do you have serious difficulty hearing? N   Are you blind or do you have serious difficulty seeing, even when wearing glasses? N   Because of a physical, mental, or emotional condition, do you have serious difficulty concentrating, remembering, or making decisions? (5 years old or older) Y  (dementia)   Do you have serious difficulty walking or climbing stairs? Y  (parkinson's)   Do you have serious difficulty dressing or bathing? N   Because of a physical, mental, or emotional condition, do you have serious difficulty doing errands alone such as visiting the doctor? Y

## 2024-10-08 NOTE — PROGRESS NOTES
Transitional Care Coordination Progress Note:  Plan per Medical/Surgical team: treatment of confusion & CHF with IV lasix  Status: Observation  Payor source: [facility]  Discharge disposition: Home with son, ?new [facility]   Potential Barriers: non compliance with meds  ADOD: 10/8/2024  REGINALD Carvajal RN, BSN Transitional Care Coordinator ED# [phone]      10/08/24 0754   Discharge Planning   Living Arrangements Spouse/significant other   Support Systems Spouse/significant other   Assistance Needed medication management assistance ?[facility] [place] nurse   Type of Residence Private residence   Number of Stairs to Enter Residence 6   Number of Stairs Within Residence 6   Home or Post Acute Services In home services   Type of Home Care Services Home nursing visits   Expected Discharge Disposition Home H   Does the patient need discharge transport arranged? No   Financial Resource Strain   How hard is it for you to pay for the very basics like food, housing, medical care, and heating? Not hard   Housing Stability   In the last 12 months, was there a time when you were not able to pay the mortgage or rent on time? N   In the past 12 months, how many times have you moved where you were living? 1   At any time in the past 12 months, were you homeless or living in a shelter (including now)? N   Transportation Needs   In the past 12 months, has lack of transportation kept you from medical appointments or from getting medications? no   In the past 12 months, has lack of transportation kept you from meetings, work, or from getting things needed for daily living? No

## 2024-10-08 NOTE — PROGRESS NOTES
Physical Therapy    Physical Therapy Evaluation & Treatment    Patient Name: Shashi Gutierrez  MRN: [id number]  Department: [facility] ED  Room: 4[facility]  Today's Date: 10/8/2024   Time Calculation  Start Time: 1251  Stop Time: 1320  Time Calculation (min): 29 min    Assessment/Plan   PT Assessment  PT Assessment Results: Decreased strength, Decreased range of motion, Decreased endurance, Impaired balance, Decreased mobility  Rehab Prognosis: Good  Barriers to Discharge: none  Evaluation/Treatment Tolerance: Patient tolerated treatment well  Medical Staff Made Aware: Yes  Strengths: Living arrangement secure, Support of Caregivers  End of Session Communication: Bedside nurse  Assessment Comment: Pt presenting with impaired mobility. Pt would benefit from moderate intensity PT to maximize functional mobility, safety and independence.  End of Session Patient Position: Bed, 2 rail up, Alarm off, not on at start of session   IP OR SWING BED PT PLAN  Inpatient or Swing Bed: Inpatient  PT Plan  Treatment/Interventions: Bed mobility, Transfer training, Gait training, Balance training, Strengthening, Endurance training, Therapeutic exercise, Therapeutic activity  PT Plan: Ongoing PT  PT Frequency: 3 times per week  PT Discharge Recommendations: Moderate intensity level of continued care  Equipment Recommended upon Discharge: Wheeled walker  PT Recommended Transfer Status: Assist x1  PT - OK to Discharge: Yes (Per POC)      Subjective     General Visit Information:  General  Reason for Referral: 79 year old male admitted with AMS post not taking Parkinson's meds x2 weeks  Referred By: Elyssa LITTLE)  Past Medical History Relevant to Rehab:   Past Medical History:   Diagnosis Date    Diuretic-induced hypokalemia 10/10/2023    ED (erectile dysfunction) 02/12/2023    Encounter for general adult medical examination without abnormal findings 03/04/2019    Encounter for annual health examination    Encounter for general adult medical examination  without abnormal findings 01/03/2021    Encounter for annual health examination    Encounter for screening for malignant neoplasm of prostate     Encounter for prostate cancer screening    Internal hemorrhoid 09/14/2012    Kidney lesion 02/12/2023    Occasional tremors 02/12/2023    Onychomycosis 05/05/2023      Family/Caregiver Present: No  Prior to Session Communication: Bedside nurse  Patient Position Received: Bed, 2 rail up, Alarm off, not on at start of session  Preferred Learning Style: auditory, kinesthetic, visual  General Comment: Pt agreeable to PT  Home Living:  Home Living  Type of Home: House  Lives With: Other (Comment) (son)  Home Adaptive Equipment: Walker rolling or standard, Cane  Home Layout: One level  Home Access: Stairs to enter with rails  Entrance Stairs-Rails: Right  Entrance Stairs-Number of Steps: 7  Bathroom Shower/Tub: Tub/shower unit  Bathroom Toilet: Standard  Bathroom Equipment: Shower chair with back  Prior Level of Function:  Prior Function Per Pt/Caregiver Report  Level of Felicity: Needs assistance with homemaking  Receives Help From: Family  ADL Assistance: Independent  Bath: Sponge bathing  Homemaking Assistance: Needs assistance  Ambulatory Assistance: Independent (with FWW)  Prior Function Comments: Pt reports MOD I for transfers and ambulation with FWW. Pt IND with sponge bathing and dressing, son does all IADLs.  Precautions:  Precautions  Medical Precautions: Fall precautions, Infection precautions    Vital Signs (Past 2hrs)        Date/Time Vitals Session Patient Position Pulse Resp SpO2 BP MAP (mmHg)    10/08/24 1216 --  --  84  18  96 %  119/72  89                        Objective   Pain:     Cognition:  Cognition  Overall Cognitive Status: Impaired  Orientation Level: Disoriented to time  Processing Speed: Within funtional limits    General Assessments:  General Observation  General Observation: No apparent distress               Activity Tolerance  Endurance:  Tolerates 10 - 20 min exercise with multiple rests    Sensation  Light Touch: No apparent deficits    Strength  Strength Comments: bilateral LEs grossly 4/5 as evidenced by function  Strength  Strength Comments: bilateral LEs grossly 4/5 as evidenced by function    Perception  Inattention/Neglect: Appears intact      Coordination  Movements are Fluid and Coordinated: No  Coordination Comment: tremors noted in bilateral UEs    Postural Control  Postural Control: Impaired  Posture Comment: forward head posture, thoracic kyphosis and rounded shoulders    Static Sitting Balance  Static Sitting-Balance Support: Feet supported  Static Sitting-Level of Assistance: Close supervision  Dynamic Sitting Balance  Dynamic Sitting-Balance Support: Feet supported  Dynamic Sitting-Level of Assistance: Close supervision    Static Standing Balance  Static Standing-Balance Support: Bilateral upper extremity supported  Static Standing-Level of Assistance: Minimum assistance  Dynamic Standing Balance  Dynamic Standing-Balance Support: Bilateral upper extremity supported  Dynamic Standing-Level of Assistance: Minimum assistance  Functional Assessments:       Bed Mobility  Bed Mobility: Yes  Bed Mobility 1  Bed Mobility 1: Supine to sitting, Sitting to supine  Level of Assistance 1:  (standby assist x1)  Bed Mobility Comments 1: with use of bedrail and increased time/effort    Transfers  Transfer: Yes  Transfer 1  Technique 1: Sit to stand, Stand to sit  Transfer Device 1: Walker  Transfer Level of Assistance 1: Minimum assistance  Trials/Comments 1: increased time required    Ambulation/Gait Training  Ambulation/Gait Training Performed: Yes  Ambulation/Gait Training 1  Device 1: Rolling walker  Assistance 1: Minimum assistance  Quality of Gait 1: Shuffling gait  Comments/Distance (ft) 1: 8 feet                 Extremity/Trunk Assessments:  RLE   RLE : Within Functional Limits  LLE   LLE : Within Functional Limits  Treatments:                  Bed Mobility  Bed Mobility: Yes  Bed Mobility 1  Bed Mobility 1: Supine to sitting, Sitting to supine  Level of Assistance 1:  (standby assist x1)  Bed Mobility Comments 1: with use of bedrail and increased time/effort    Ambulation/Gait Training  Ambulation/Gait Training Performed: Yes  Ambulation/Gait Training 1  Device 1: Rolling walker  Assistance 1: Minimum assistance  Quality of Gait 1: Shuffling gait  Comments/Distance (ft) 1: 8 feet  Transfers  Transfer: Yes  Transfer 1  Technique 1: Sit to stand, Stand to sit  Transfer Device 1: Walker  Transfer Level of Assistance 1: Minimum assistance  Trials/Comments 1: increased time required       Outcome Measures:  [facility] Basic Mobility  Turning from your back to your side while in a flat bed without using bedrails: A little  Moving from lying on your back to sitting on the side of a flat bed without using bedrails: A little  Moving to and from bed to chair (including a wheelchair): A little  Standing up from a chair using your arms (e.g. wheelchair or bedside chair): A little  To walk in hospital room: A lot  Climbing 3-5 steps with railing: Total  Basic Mobility - Total Score: 15    Encounter Problems       Encounter Problems (Active)       Mobility       STG - Patient will ambulate 100 feet with FWW and CG       Start:  10/08/24    Expected End:  10/22/24               PT Transfers       STG - Transfer from bed to chair with FWW and supervision       Start:  10/08/24    Expected End:  10/22/24            STG - Patient to transfer to and from sit to supine Independently       Start:  10/08/24    Expected End:  10/22/24            STG - Patient will transfer sit to and from stand with MOD I       Start:  10/08/24    Expected End:  10/22/24                   Education Documentation  Precautions, taught by No Chapa, PT at 10/8/2024  1:47 PM.  Learner: Patient  Readiness: Acceptance  Method: Explanation, Demonstration  Response: Verbalizes  Understanding, Demonstrated Understanding, Needs Reinforcement    Body Mechanics, taught by No Chapa, PT at 10/8/2024  1:47 PM.  Learner: Patient  Readiness: Acceptance  Method: Explanation, Demonstration  Response: Verbalizes Understanding, Demonstrated Understanding, Needs Reinforcement    Mobility Training, taught by No Chapa, PT at 10/8/2024  1:47 PM.  Learner: Patient  Readiness: Acceptance  Method: Explanation, Demonstration  Response: Verbalizes Understanding, Demonstrated Understanding, Needs Reinforcement    Education Comments  No comments found.

## 2024-10-08 NOTE — ED PROVIDER NOTES
HPI   Chief Complaint   Patient presents with   • Tremors       HPI: []  79-year-old  male history of hypertension, advanced Parkinson disease, today comes in with altered mental status.  He did see the son apparently did not take his Parkinson medication for couple days and became almost catatonic and more confused.  Which is typical when this happens.  Some given his meds today.  No history of trauma falls fever chills nausea vomit diarrhea cough congestion incontinence seizures syncope onus given no hematemesis melena medic easier no hemoptysis no recent travel hospitalizations or antibiotic use.    Past history: Parkinson disease, hypertension, mild dementia  Social: No history of reported tobacco alcohol drug abuse.  REVIEW OF SYSTEMS:    GENERAL.: No weight loss, fatigue, anorexia, insomnia, fever.    EYES: No vision loss, double vision, drainage, eye pain.    ENT: No pharyngitis, dry mouth.    CARDIOPULMONARY: No chest pain, palpitations, syncope, near syncope. No shortness of breath, cough, hemoptysis.    GI: No abdominal pain, change in bowel habits, melena, hematemesis, hematochezia, nausea, vomiting, diarrhea.    : No discharge, dysuria, frequency, urgency, hematuria.    MS: No limb pain, joint pain, joint swelling.  Neuro: Positive for confusion  SKIN: No rashes.    PSYCH: No depression, anxiety, suicidality, homicidality.    Review of systems is otherwise negative unless stated above or in history of present illness.  Social history, family history, allergies reviewed.  PHYSICAL EXAM:    GENERAL: Vitals noted, no distress. Alert and oriented  x 0 disheveled poorly kempt poor hygiene chronic ill-appearing non-toxic.      EENT: TMs clear. Posterior oropharynx unremarkable. No meningismus. No LAD.     NECK: Supple. Nontender. No midline tenderness.     CARDIAC: Regular, rate, rhythm. No murmurs rubs or gallops. No JVD    PULMONARY: Lungs clear bilaterally with good aeration. No wheezes rales or  rhonchi. No respiratory distress.  Fine bibasilar crackles no tachypnea stridor or retractions    ABDOMEN: Soft, nonsurgical. Nontender. No peritoneal signs. Normoactive bowel sounds. No pulsatile masses.     EXTREMITIES: 2+ bilateral symmetric nonpitting peripheral edema. Negative Homans bilaterally, no cords.  Advanced onychomycosis of the toes    SKIN: No rash. Intact.     NEURO: No focal neurologic deficits, NIH score of 0. Cranial nerves normal as tested from II through XII.     MEDICAL DECISION MAKING:  EKG on my interpretation shows a normal sinus rhythm left axis deviation rate mid with no acute ischemic changes.  CBC with differential chemistry LFTs are normal BNP is normal troponin is negative TSH normal chest x-ray shows pulm vessel congestion venous full panel shows no hypercapnia CT head is pending ultrasound of extremities pending.    Treatment: IV established placed on a cardiac monitor given IV Lasix.    Course: Patient remained stable hemodynamic.  But remains confused and almost catatonic    Impression: #1 altered mental status, #2 diastolic heart failure, #3 Parkinson disease  Plan set MDM: 73 male history of advanced Parkinson disease lives at home with his son comes in with what appears to be parkinsonian exacerbation due to medication noncompliance appears to be mildly volume overloaded with diastolic heart failure low concern for stroke TIA infection sepsis or dehydration.  Patient's family is requesting placement to a rehab facility, patient will be hospitalized for further care and to be placed into a rehab facility.              Patient History   Past Medical History:   Diagnosis Date   • Diuretic-induced hypokalemia 10/10/2023   • ED (erectile dysfunction) 02/12/2023   • Encounter for general adult medical examination without abnormal findings 03/04/2019    Encounter for annual health examination   • Encounter for general adult medical examination without abnormal findings 01/03/2021     Encounter for annual health examination   • Encounter for screening for malignant neoplasm of prostate     Encounter for prostate cancer screening   • Internal hemorrhoid 2012   • Kidney lesion 2023   • Occasional tremors 2023   • Onychomycosis 2023     Past Surgical History:   Procedure Laterality Date   • CT ANGIO CORONARY ART WITH HEARTFLOW IF SCORE >30%  2018    CT HEART CORONARY ANGIOGRAM 2018 [facility]     Family History   Problem Relation Name Age of Onset   • No Known Problems Other       Social History     Tobacco Use   • Smoking status: Former     Types: Cigars     Quit date:      Years since quittin.7   • Smokeless tobacco: Never   Substance Use Topics   • Alcohol use: Not Currently   • Drug use: Never       Physical Exam   ED Triage Vitals [10/07/24 1025]   Temperature Heart Rate Respirations BP   36.7 °C (98 °F) 80 16 126/71      Pulse Ox Temp src Heart Rate Source Patient Position   96 % -- -- --      BP Location FiO2 (%)     -- --       Physical Exam      ED Course & MDM   ED Course as of 10/07/24 2155   Mon Oct 07, 2024   2150 Patient's CBC with differential shows no leukocytosis chemistries LFTs are unremarkable UA negative UTI venous full panel shows no hypercapnia troponin is negative magnesium is normal chest x-ray shows a pulm vessel congestion CT head is pending patient given IV Lasix due to concern for volume overload ultrasound of the lower extremities pending and patient will be hospitalized for further care. [MT]      ED Course User Index  [MT] Soila Mcfarland MD         Diagnoses as of 10/07/24 2155   Confusion   Acute on chronic diastolic congestive heart failure   Parkinson's disease with fluctuating manifestations, unspecified whether dyskinesia present                 No data recorded     Willy Coma Scale Score: 15 (10/07/24 1136 : Vi Waldrop RN)                           Medical Decision  Making      Procedure  Procedures     Soila Mcfarland MD  10/07/24 8425

## 2024-10-08 NOTE — H&P
"History Of Present Illness  Shashi Gutierrez is a 79 y.o. male presenting with \"confusion.\"  Past Medical History:   Diagnosis Date    Diuretic-induced hypokalemia 10/10/2023    ED (erectile dysfunction) 2023    Encounter for general adult medical examination without abnormal findings 2019    Encounter for annual health examination    Encounter for general adult medical examination without abnormal findings 2021    Encounter for annual health examination    Encounter for screening for malignant neoplasm of prostate     Encounter for prostate cancer screening    Internal hemorrhoid 2012    Kidney lesion 2023    Occasional tremors 2023    Onychomycosis 2023     Shashi' son assists with his parkinson meds, but hadnt been able to the past few days; Shashi is usually able to take them on his own if need be, but didn't get around to it. Apparently, when he misses doses of these meds, he becomes confused and malaised. When I saw him today, he felt at his baseline. He did receive his meds just prior to coming to ED. Work-up here is unremarkable. He was given IV lasix for leg swelling; his BNP is normal.     Past Surgical History:   Procedure Laterality Date    CT ANGIO CORONARY ART WITH HEARTFLOW IF SCORE >30%  2018    CT HEART CORONARY ANGIOGRAM 2018 [facility]     Social History     Tobacco Use    Smoking status: Former     Types: Cigars     Quit date:      Years since quittin.8    Smokeless tobacco: Never   Substance Use Topics    Alcohol use: Not Currently    Drug use: Never     Family History   Problem Relation Name Age of Onset    No Known Problems Other       Allergies  Aspirin, Carvedilol, and Metoprolol    Review of Systems   Constitutional: Negative.    HENT: Negative.     Respiratory: Negative.     Cardiovascular: Negative.    Gastrointestinal: Negative.    Genitourinary: Negative.    Musculoskeletal: Negative.    Skin: Negative.    Neurological: " "Negative.    Psychiatric/Behavioral: Negative.         Last Recorded Vitals  Blood pressure 127/71, pulse 66, temperature 37 °C (98.6 °F), temperature source Temporal, resp. rate 18, height 1.778 m (5' 10\"), weight 90.3 kg (199 lb), SpO2 95%.  Physical Exam  Cardiovascular:      Rate and Rhythm: Normal rate and regular rhythm.      Heart sounds: Normal heart sounds.   Pulmonary:      Breath sounds: Normal breath sounds.   Abdominal:      General: Bowel sounds are normal.      Palpations: Abdomen is soft.   Musculoskeletal:         General: Normal range of motion.   Neurological:      General: No focal deficit present.      Mental Status: He is alert and oriented to person, place, and time.      Motor: Tremor present.   Psychiatric:         Mood and Affect: Mood normal.           Relevant Results           Assessment/Plan   Assessment & Plan  BPH with obstruction/lower urinary tract symptoms    Constipation    Hypertension, essential    Parkinson's disease (Multi)    CAD (coronary artery disease), native coronary artery    AF (atrial fibrillation) (Multi)    - son would like placement; working on that  - no changes to his meds         I spent 50 minutes in the professional and overall care of this patient.      Itz Bergeron MD  "

## 2024-10-08 NOTE — PROGRESS NOTES
Pharmacy Medication History Review    Shashi Gutierrez is a 79 y.o. male admitted for Confusion. Pharmacy reviewed the patient's [id number] medications and allergies for accuracy.    The list below reflectives the updated PTA list. Please review each medication in order reconciliation for additional clarification and justification.  Prior to Admission Medications   Prescriptions Last Dose Informant   acetaminophen (Tylenol) 325 mg tablet Unknown Child   Sig: Take 1 tablet (325 mg) by mouth every 4 hours if needed for fever (temp greater than 38.0 C) or headaches.   albuterol (ProAir HFA) 90 mcg/actuation inhaler Unknown Child   Sig: Inhale 2 puffs every 6 hours if needed for shortness of breath or wheezing.   atorvastatin (Lipitor) 20 mg tablet Unknown Child   Sig: Take 1 tablet (20 mg) by mouth once daily.   carbidopa-levodopa (Sinemet)  mg tablet 10/7/2024 Child   Sig: Take 1 tablet by mouth 3 times a day.   diclofenac sodium (Voltaren) 1 % gel Unknown Child   Sig: Apply 4.5 inches (4 g) topically 4 times a day.   docusate sodium (Colace) 100 mg capsule Unknown Child   Sig: Take 1 capsule (100 mg) by mouth once daily.   fexofenadine (Allegra) 180 mg tablet Unknown Child   Sig: Take 1 tablet (180 mg) by mouth once daily.   fluticasone (Flonase) 50 mcg/actuation nasal spray Unknown Child   Sig: Administer 2 sprays into each nostril once daily as needed for rhinitis. Shake liquid before use   folic acid/multivit-min/lutein (CENTRUM SILVER ORAL) Unknown Child   Sig: Take 1 tablet by mouth once daily.   furosemide (Lasix) 40 mg tablet Unknown Child   Sig: Take 0.5 tablets (20 mg) by mouth once daily.   lisinopril 5 mg tablet Unknown Child   Sig: Take 1 tablet (5 mg) by mouth once daily.   potassium chloride CR 10 mEq ER tablet Unknown Child   Sig: Take 1 tablet (10 mEq) by mouth once daily.   rivaroxaban (Xarelto) 20 mg tablet Unknown Child   Sig: Take 1 tablet (20 mg) by mouth once daily.   sildenafil  (Viagra) 50 mg tablet Unknown Child   Sig: Take 1 tablet (50 mg) by mouth once daily as needed for erectile dysfunction. One hour before needed      Facility-Administered Medications: None         The list below reflectives the updated allergy list. Please review each documented allergy for additional clarification and justification.  Allergies  Reviewed by Donovan Randolph on 10/8/2024        Severity Reactions Comments    Aspirin Not Specified Other Ok with 81m g dose Reaction: 325 mg dose  has severe rectal bleeding    Carvedilol Not Specified Unknown Patient states not sure     Metoprolol Not Specified Other Sexual dysfunction            Below are additional concerns with the patient's PTA list.  The following updates were made to the Prior to Admission medication list:     Source of Information:     Medications ADDED:   N/a  Medications CHANGED:  N/a  Medications REMOVED:   N/a  Medications NOT TAKING:   N/a    Allergy reviewed : Yes    Comments:       Donovan Randolph

## 2024-10-09 LAB
ALBUMIN SERPL BCP-MCNC: 4.1 G/DL (ref 3.4–5)
ALP SERPL-CCNC: 62 U/L (ref 33–136)
ALT SERPL W P-5'-P-CCNC: 12 U/L (ref 10–52)
ANION GAP SERPL CALC-SCNC: 8 MMOL/L (ref 10–20)
AST SERPL W P-5'-P-CCNC: 59 U/L (ref 9–39)
BILIRUB SERPL-MCNC: 0.7 MG/DL (ref 0–1.2)
BUN SERPL-MCNC: 18 MG/DL (ref 6–23)
CALCIUM SERPL-MCNC: 9.1 MG/DL (ref 8.6–10.3)
CHLORIDE SERPL-SCNC: 100 MMOL/L (ref 98–107)
CK SERPL-CCNC: 1340 U/L (ref 0–325)
CO2 SERPL-SCNC: 35 MMOL/L (ref 21–32)
CREAT SERPL-MCNC: 1.06 MG/DL (ref 0.5–1.3)
EGFRCR SERPLBLD CKD-EPI 2021: 72 ML/MIN/1.73M*2
GLUCOSE SERPL-MCNC: 80 MG/DL (ref 74–99)
POTASSIUM SERPL-SCNC: 3.8 MMOL/L (ref 3.5–5.3)
PROT SERPL-MCNC: 7.9 G/DL (ref 6.4–8.2)
SODIUM SERPL-SCNC: 139 MMOL/L (ref 136–145)

## 2024-10-09 PROCEDURE — 36415 COLL VENOUS BLD VENIPUNCTURE: CPT | Performed by: INTERNAL MEDICINE

## 2024-10-09 PROCEDURE — 99232 SBSQ HOSP IP/OBS MODERATE 35: CPT | Performed by: INTERNAL MEDICINE

## 2024-10-09 PROCEDURE — 97165 OT EVAL LOW COMPLEX 30 MIN: CPT | Mod: GO

## 2024-10-09 PROCEDURE — 2500000004 HC RX 250 GENERAL PHARMACY W/ HCPCS (ALT 636 FOR OP/ED): Performed by: INTERNAL MEDICINE

## 2024-10-09 PROCEDURE — 82550 ASSAY OF CK (CPK): CPT | Performed by: INTERNAL MEDICINE

## 2024-10-09 PROCEDURE — 2500000002 HC RX 250 W HCPCS SELF ADMINISTERED DRUGS (ALT 637 FOR MEDICARE OP, ALT 636 FOR OP/ED): Mod: MUE | Performed by: INTERNAL MEDICINE

## 2024-10-09 PROCEDURE — G0378 HOSPITAL OBSERVATION PER HR: HCPCS

## 2024-10-09 PROCEDURE — 80053 COMPREHEN METABOLIC PANEL: CPT | Performed by: INTERNAL MEDICINE

## 2024-10-09 PROCEDURE — 2500000001 HC RX 250 WO HCPCS SELF ADMINISTERED DRUGS (ALT 637 FOR MEDICARE OP): Performed by: INTERNAL MEDICINE

## 2024-10-09 ASSESSMENT — COGNITIVE AND FUNCTIONAL STATUS - GENERAL
MOVING TO AND FROM BED TO CHAIR: A LOT
DAILY ACTIVITIY SCORE: 14
TURNING FROM BACK TO SIDE WHILE IN FLAT BAD: A LOT
EATING MEALS: A LOT
TOILETING: A LOT
MOVING FROM LYING ON BACK TO SITTING ON SIDE OF FLAT BED WITH BEDRAILS: A LOT
DRESSING REGULAR LOWER BODY CLOTHING: A LOT
DAILY ACTIVITIY SCORE: 12
MOBILITY SCORE: 12
DRESSING REGULAR LOWER BODY CLOTHING: TOTAL
HELP NEEDED FOR BATHING: A LOT
PERSONAL GROOMING: A LITTLE
TOILETING: A LOT
STANDING UP FROM CHAIR USING ARMS: A LOT
DRESSING REGULAR UPPER BODY CLOTHING: A LOT
DRESSING REGULAR UPPER BODY CLOTHING: A LOT
WALKING IN HOSPITAL ROOM: A LOT
CLIMB 3 TO 5 STEPS WITH RAILING: A LOT
PERSONAL GROOMING: A LOT
HELP NEEDED FOR BATHING: A LOT

## 2024-10-09 ASSESSMENT — ACTIVITIES OF DAILY LIVING (ADL)
BATHING_ASSISTANCE: MODERATE
BATHING_ASSISTANCE: SPONGE BATHING
ADL_ASSISTANCE: INDEPENDENT

## 2024-10-09 ASSESSMENT — PAIN SCALES - GENERAL
PAINLEVEL_OUTOF10: 0 - NO PAIN

## 2024-10-09 ASSESSMENT — PAIN - FUNCTIONAL ASSESSMENT: PAIN_FUNCTIONAL_ASSESSMENT: 0-10

## 2024-10-09 NOTE — PROGRESS NOTES
10/09/24 1136   Discharge Planning   Expected Discharge Disposition SNF     SW trying to reach sons to confirm dc plan  Per Dr Bergeron patient is med ready for discharge  Patient has provided SW with alternate numbers for sons to finalize dc planning    ADOD today/tomorrow  BARRIERS contacting family to confirm [facility] vs SNF  DISPO [facility] vs SNF    UPDATES son Glenn wants [facility], need auth, if they can not accept need more SNF choices

## 2024-10-09 NOTE — PROGRESS NOTES
Occupational Therapy    Evaluation    Patient Name: Shashi Gutierrez  MRN: [id number]  Department: [place] [address]  Room: [address] [place] [address]  Today's Date: 10/9/2024  Time Calculation  Start Time: 1030  Stop Time: 1049  Time Calculation (min): 19 min    Assessment  IP OT Assessment  OT Assessment:  (OT Eval complete, patient is weak and deconditoined. Patient at risk for falls. Patient with decreased standing balance, decreased transfers, decreased ADLs. Moderate intensity therapy recommended.)  Prognosis: Good  Barriers to Discharge: Decreased caregiver support  Evaluation/Treatment Tolerance: Patient limited by fatigue  Medical Staff Made Aware: Yes  End of Session Communication: Bedside nurse  End of Session Patient Position: Bed, 2 rail up, Alarm on  Plan:  Treatment Interventions: ADL retraining, Functional transfer training, Endurance training, Patient/family training, Neuromuscular reeducation  OT Frequency: 3 times per week  OT Discharge Recommendations: Moderate intensity level of continued care  Equipment Recommended upon Discharge: Wheeled walker (Hip kit)  OT Recommended Transfer Status: Moderate assist  OT - OK to Discharge: Yes    Subjective   Current Problem:  1. Confusion        2. Acute on chronic diastolic congestive heart failure        3. Parkinson's disease with fluctuating manifestations, unspecified whether dyskinesia present          General:  General  Reason for Referral: 79 year old male admitted with AMS post not taking Parkinson's meds x2 weeks  Referred By: Elyssa LITTLE)  Past Medical History Relevant to Rehab:   Past Medical History:   Diagnosis Date    Diuretic-induced hypokalemia 10/10/2023    ED (erectile dysfunction) 02/12/2023    Encounter for general adult medical examination without abnormal findings 03/04/2019    Encounter for annual health examination    Encounter for general adult medical examination without abnormal findings 01/03/2021    Encounter for annual health examination    Encounter for  screening for malignant neoplasm of prostate     Encounter for prostate cancer screening    Internal hemorrhoid 09/14/2012    Kidney lesion 02/12/2023    Occasional tremors 02/12/2023    Onychomycosis 05/05/2023     Family/Caregiver Present: No  Prior to Session Communication: Bedside nurse  Patient Position Received: Bed, 2 rail up, Alarm on  General Comment:  (Patient with resting tremor, weak and deconditioned.)  Precautions:  Medical Precautions: Fall precautions    Pain:  Pain Assessment  Pain Assessment: 0-10  0-10 (Numeric) Pain Score: 0 - No pain    Objective   Cognition:  Overall Cognitive Status: Impaired  Orientation Level: Disoriented to time  Processing Speed: Delayed     Home Living:  Type of Home:  (2 story home with 7 steps, bed/bath on 1st floor, tub/shower combo, std ht toilet.)  Lives With:  (Son)  Home Adaptive Equipment: Walker rolling or standard, Cane   Prior Function:  Level of [place]: Needs assistance with homemaking  Receives Help From: Family  ADL Assistance: Independent  Bath: Sponge bathing  Homemaking Assistance: Needs assistance  Homemaking Assistance Comments:  (Son completes)  Ambulatory Assistance: Independent (with FWW)  IADL History:  Homemaking Responsibilities:  (Son completes.)  ADL:  Eating Assistance: Independent  Grooming Assistance: Minimal  Bathing Assistance: Moderate (Assist to bathe below knees.)  UE Dressing Assistance: Moderate  LE Dressing Assistance: Maximal (Unable to bend over at waist, LE adaptive equipment required.)  Toileting Assistance with Device: Maximal  Functional Assistance: Maximal  Activity Tolerance:  Endurance: Tolerates 10 - 20 min exercise with multiple rests  Bed Mobility/Transfers: Bed Mobility  Bed Mobility: Yes  Bed Mobility 1  Bed Mobility 1: Supine to sitting  Level of Assistance 1: Moderate assistance  Bed Mobility Comments 1:  (Assist with upper trunk.)    Transfers  Transfer: Yes  Transfer 1  Transfer From 1: Bed to  Transfer to 1:  Chair with arms  Technique 1: Sit to stand  Transfer Device 1: Walker  Transfer Level of Assistance 1: Moderate assistance  Trials/Comments 1:  (Increased time to complete task.)    Sitting Balance:  Static Sitting Balance  Static Sitting-Balance Support: Feet supported  Static Sitting-Level of Assistance: Close supervision  Dynamic Sitting Balance  Dynamic Sitting-Balance Support: Feet supported  Dynamic Sitting-Level of Assistance: Minimum assistance  Dynamic Sitting-Balance: Forward lean  Standing Balance:  Static Standing Balance  Static Standing-Balance Support: Bilateral upper extremity supported  Static Standing-Level of Assistance: Moderate assistance  Static Standing-Comment/Number of Minutes:  (Unsteady on feet.)    IADL's:   Homemaking Responsibilities:  (Son completes.)    Sensation:  Light Touch: No apparent deficits  Strength:  Strength Comments:  (B UEs- 4/5 throughout)    Coordination:  Movements are Fluid and Coordinated: No (Ataxic)  Upper Body Coordination:  (R hand tremor observed.)   Hand Function:  Hand Function  Gross Grasp: Functional  Coordination: Impaired (Hands)    Outcome Measures: [facility] Daily Activity  Putting on and taking off regular lower body clothing: Total  Bathing (including washing, rinsing, drying): A lot  Putting on and taking off regular upper body clothing: A lot  Toileting, which includes using toilet, bedpan or urinal: A lot  Taking care of personal grooming such as brushing teeth: A little  Eating Meals: None  Daily Activity - Total Score: 14    Goals:   Encounter Problems       Encounter Problems (Active)       ADLs       Patient will perform UB and LB bathing with independent level of assistance.       Start:  10/09/24    Expected End:  10/23/24            Patient with complete upper body dressing with independent level of assistance donning and doffing all UE clothes with no adaptive equipment while edge of bed        Start:  10/09/24    Expected End:  10/23/24             Patient with complete lower body dressing with independent level of assistance donning and doffing all LE clothes  with reacher, sock-aid, and dressing stick  while standing       Start:  10/09/24    Expected End:  10/23/24            Patient will complete daily grooming tasks brushing teeth with independent level of assistance and PRN adaptive equipment while standing.       Start:  10/09/24    Expected End:  10/23/24            Patient will complete toileting including hygiene clothing management/hygiene with independent level of assistance and raised toilet seat.       Start:  10/09/24    Expected End:  10/23/24               BALANCE       Pt will maintain dynamic standing balance during ADL task with independent level of assistance in order to demonstrate decreased risk of falling and improved postural control.       Start:  10/09/24    Expected End:  10/23/24               TRANSFERS       Patient will perform bed mobility independent level of assistance and bed rails in order to improve safety and independence with mobility       Start:  10/09/24    Expected End:  10/23/24            Patient will complete functional transfer to all surfaces with front wheeled walker with supervision level of assistance.       Start:  10/09/24    Expected End:  10/23/24

## 2024-10-09 NOTE — CARE PLAN
Problem: Fall/Injury  Goal: Not fall by end of shift  Outcome: Progressing  Goal: Be free from injury by end of the shift  Outcome: Progressing  Goal: Verbalize understanding of personal risk factors for fall in the hospital  Outcome: Progressing  Goal: Verbalize understanding of risk factor reduction measures to prevent injury from fall in the home  Outcome: Progressing  Goal: Use assistive devices by end of the shift  Outcome: Progressing  Goal: Pace activities to prevent fatigue by end of the shift  Outcome: Progressing     Problem: Pain - Adult  Goal: Verbalizes/displays adequate comfort level or baseline comfort level  Outcome: Progressing     Problem: Safety - Adult  Goal: Free from fall injury  Outcome: Progressing     Problem: Discharge Planning  Goal: Discharge to home or other facility with appropriate resources  Outcome: Progressing     Problem: Chronic Conditions and Co-morbidities  Goal: Patient's chronic conditions and co-morbidity symptoms are monitored and maintained or improved  Outcome: Progressing

## 2024-10-09 NOTE — PROGRESS NOTES
10/09/24 0839   Discharge Planning   Assistance Needed SW called and left a message for pt maribell Pepper. SW also met with pt at bedside who provided phone numbers for son Glenn [phone] and maribell Medina [phone]. Unable to reach any of pts son- left messages and waiting on call back.     ADDED 1109: SW attempted to call sons Glenn and Adam again- still unable to reach.     ADDEd 1232: Maribell Elizabeth called SW back. Son would like pt to go to [facility]. DSC is aware to send referral.

## 2024-10-09 NOTE — PROGRESS NOTES
"Shsahi Gutierrez is a 79 y.o. male on day 0 of admission presenting with Parkinson's disease (Multi).    Assessment/Plan        Principal Problem:    Parkinson's disease (Multi)  Active Problems:    BPH with obstruction/lower urinary tract symptoms    Constipation    Hypertension, essential    CAD (coronary artery disease), native coronary artery    AF (atrial fibrillation) (Multi)  - heel pain could be due to pressure from being in bed a lot of the time; will put on prevlon boots  - son would like SNF; working on that; needs precert  - he otherwise seems to be at his baseline           Subjective   Notes that both heels bother him. Otherwise, feels decent.          Objective     Physical Exam  Cardiovascular:      Rate and Rhythm: Normal rate and regular rhythm.      Heart sounds: Normal heart sounds.   Pulmonary:      Breath sounds: Normal breath sounds.   Abdominal:      General: Bowel sounds are normal.      Palpations: Abdomen is soft.   Musculoskeletal:         General: Normal range of motion.   Neurological:      General: No focal deficit present.      Mental Status: He is alert and oriented to person, place, and time.   Psychiatric:         Mood and Affect: Mood normal.         Last Recorded Vitals  Blood pressure 125/71, pulse 67, temperature 36.5 °C (97.7 °F), temperature source Temporal, resp. rate 17, height 1.778 m (5' 10\"), weight 90.3 kg (199 lb), SpO2 95%.  Intake/Output last 3 Shifts:  I/O last 3 completed shifts:  In: 0 (0 mL/kg)   Out: 2400 (26.6 mL/kg) [Urine:2400 (0.7 mL/kg/hr)]  Weight: 90.3 kg     Relevant Results                             I spent 40 minutes in the professional and overall care of this patient.      Itz Bergeron MD  "

## 2024-10-10 VITALS
OXYGEN SATURATION: 96 % | BODY MASS INDEX: 28.49 KG/M2 | DIASTOLIC BLOOD PRESSURE: 74 MMHG | SYSTOLIC BLOOD PRESSURE: 140 MMHG | TEMPERATURE: 97.3 F | WEIGHT: 199 LBS | HEIGHT: 70 IN | HEART RATE: 74 BPM | RESPIRATION RATE: 17 BRPM

## 2024-10-10 PROCEDURE — 2500000002 HC RX 250 W HCPCS SELF ADMINISTERED DRUGS (ALT 637 FOR MEDICARE OP, ALT 636 FOR OP/ED): Performed by: INTERNAL MEDICINE

## 2024-10-10 PROCEDURE — 97535 SELF CARE MNGMENT TRAINING: CPT | Mod: GO

## 2024-10-10 PROCEDURE — G0378 HOSPITAL OBSERVATION PER HR: HCPCS

## 2024-10-10 PROCEDURE — 2500000001 HC RX 250 WO HCPCS SELF ADMINISTERED DRUGS (ALT 637 FOR MEDICARE OP): Performed by: INTERNAL MEDICINE

## 2024-10-10 PROCEDURE — 2500000004 HC RX 250 GENERAL PHARMACY W/ HCPCS (ALT 636 FOR OP/ED): Performed by: INTERNAL MEDICINE

## 2024-10-10 PROCEDURE — 97116 GAIT TRAINING THERAPY: CPT | Mod: GP,CQ

## 2024-10-10 PROCEDURE — 99239 HOSP IP/OBS DSCHRG MGMT >30: CPT | Performed by: INTERNAL MEDICINE

## 2024-10-10 RX ORDER — SENNOSIDES 8.6 MG/1
1 TABLET ORAL 2 TIMES DAILY
Start: 2024-10-10

## 2024-10-10 ASSESSMENT — COGNITIVE AND FUNCTIONAL STATUS - GENERAL
TOILETING: A LOT
DAILY ACTIVITIY SCORE: 12
CLIMB 3 TO 5 STEPS WITH RAILING: A LOT
DAILY ACTIVITIY SCORE: 16
HELP NEEDED FOR BATHING: A LOT
DRESSING REGULAR UPPER BODY CLOTHING: A LOT
WALKING IN HOSPITAL ROOM: A LOT
MOVING TO AND FROM BED TO CHAIR: A LOT
EATING MEALS: A LOT
TURNING FROM BACK TO SIDE WHILE IN FLAT BAD: A LOT
DRESSING REGULAR LOWER BODY CLOTHING: A LOT
MOVING FROM LYING ON BACK TO SITTING ON SIDE OF FLAT BED WITH BEDRAILS: A LITTLE
MOVING TO AND FROM BED TO CHAIR: A LOT
TURNING FROM BACK TO SIDE WHILE IN FLAT BAD: A LOT
MOVING FROM LYING ON BACK TO SITTING ON SIDE OF FLAT BED WITH BEDRAILS: A LOT
TOILETING: A LOT
EATING MEALS: A LOT
MOVING FROM LYING ON BACK TO SITTING ON SIDE OF FLAT BED WITH BEDRAILS: A LOT
DAILY ACTIVITIY SCORE: 12
MOBILITY SCORE: 15
WALKING IN HOSPITAL ROOM: A LITTLE
HELP NEEDED FOR BATHING: A LOT
MOVING TO AND FROM BED TO CHAIR: A LOT
TOILETING: A LOT
STANDING UP FROM CHAIR USING ARMS: A LOT
HELP NEEDED FOR BATHING: A LOT
DRESSING REGULAR LOWER BODY CLOTHING: A LOT
PERSONAL GROOMING: A LOT
PERSONAL GROOMING: A LITTLE
TURNING FROM BACK TO SIDE WHILE IN FLAT BAD: A LOT
DRESSING REGULAR UPPER BODY CLOTHING: A LOT
MOBILITY SCORE: 12
DRESSING REGULAR LOWER BODY CLOTHING: A LOT
MOBILITY SCORE: 12
TOILETING: A LOT
STANDING UP FROM CHAIR USING ARMS: A LOT
MOVING FROM LYING ON BACK TO SITTING ON SIDE OF FLAT BED WITH BEDRAILS: A LOT
EATING MEALS: A LOT
STANDING UP FROM CHAIR USING ARMS: A LOT
CLIMB 3 TO 5 STEPS WITH RAILING: A LOT
DRESSING REGULAR LOWER BODY CLOTHING: A LOT
TOILETING: A LOT
DRESSING REGULAR UPPER BODY CLOTHING: A LOT
WALKING IN HOSPITAL ROOM: A LOT
MOVING TO AND FROM BED TO CHAIR: A LITTLE
PERSONAL GROOMING: A LOT
MOBILITY SCORE: 12
WALKING IN HOSPITAL ROOM: A LOT
CLIMB 3 TO 5 STEPS WITH RAILING: A LOT
MOVING FROM LYING ON BACK TO SITTING ON SIDE OF FLAT BED WITH BEDRAILS: A LOT
CLIMB 3 TO 5 STEPS WITH RAILING: A LOT
STANDING UP FROM CHAIR USING ARMS: A LOT
DRESSING REGULAR LOWER BODY CLOTHING: A LOT
HELP NEEDED FOR BATHING: A LOT
PERSONAL GROOMING: A LOT
STANDING UP FROM CHAIR USING ARMS: A LOT
DAILY ACTIVITIY SCORE: 12
DAILY ACTIVITIY SCORE: 12
MOBILITY SCORE: 12
EATING MEALS: A LOT
WALKING IN HOSPITAL ROOM: A LOT
HELP NEEDED FOR BATHING: A LOT
DRESSING REGULAR UPPER BODY CLOTHING: A LITTLE
PERSONAL GROOMING: A LOT
TURNING FROM BACK TO SIDE WHILE IN FLAT BAD: A LOT
DRESSING REGULAR UPPER BODY CLOTHING: A LOT
MOVING TO AND FROM BED TO CHAIR: A LOT
TURNING FROM BACK TO SIDE WHILE IN FLAT BAD: A LITTLE
CLIMB 3 TO 5 STEPS WITH RAILING: TOTAL

## 2024-10-10 ASSESSMENT — PAIN - FUNCTIONAL ASSESSMENT
PAIN_FUNCTIONAL_ASSESSMENT: 0-10

## 2024-10-10 ASSESSMENT — PAIN SCALES - GENERAL
PAINLEVEL_OUTOF10: 0 - NO PAIN
PAINLEVEL_OUTOF10: 0 - NO PAIN
PAINLEVEL_OUTOF10: 8

## 2024-10-10 ASSESSMENT — ACTIVITIES OF DAILY LIVING (ADL)
HOME_MANAGEMENT_TIME_ENTRY: 18
BATHING_LEVEL_OF_ASSISTANCE: MODERATE ASSISTANCE

## 2024-10-10 NOTE — PROGRESS NOTES
Physical Therapy    Physical Therapy Treatment    Patient Name: Shashi Gutierrez  MRN: [id number]  Department: [place] [address]  Room: [address] [place] [address]  Today's Date: 10/10/2024  Time Calculation  Start Time: 1359  Stop Time: 1417  Time Calculation (min): 18 min         Assessment/Plan   PT Assessment  Rehab Prognosis: Good  Barriers to Discharge: none  End of Session Communication: Bedside nurse  Assessment Comment: Pt requires physical assist for transfers. Pt fatigues quickly and requires standing rest breaks.  End of Session Patient Position: Up in chair, Alarm on  PT Plan  Inpatient/Swing Bed or Outpatient: Inpatient  PT Plan  Treatment/Interventions: Bed mobility, Transfer training, Gait training  PT Plan: Ongoing PT  PT Frequency: 3 times per week  PT Discharge Recommendations: Moderate intensity level of continued care  Equipment Recommended upon Discharge: Wheeled walker  PT Recommended Transfer Status: Assist x1  PT - OK to Discharge: Yes (per POC)      General Visit Information:   PT  Visit  PT Received On: 10/10/24  General  Reason for Referral: 79 year old male admitted with AMS post not taking Parkinson's meds x2 weeks  Referred By: Elyssa LITTLE)  Past Medical History Relevant to Rehab:   Past Medical History:   Diagnosis Date    Diuretic-induced hypokalemia 10/10/2023    ED (erectile dysfunction) 02/12/2023    Encounter for general adult medical examination without abnormal findings 03/04/2019    Encounter for annual health examination    Encounter for general adult medical examination without abnormal findings 01/03/2021    Encounter for annual health examination    Encounter for screening for malignant neoplasm of prostate     Encounter for prostate cancer screening    Internal hemorrhoid 09/14/2012    Kidney lesion 02/12/2023    Occasional tremors 02/12/2023    Onychomycosis 05/05/2023       Prior to Session Communication: Bedside nurse  Patient Position Received: Bed, 3 rail up, Alarm on    Subjective    Precautions:  Precautions  Medical Precautions: Fall precautions    Objective   Pain:  Pain Assessment  Pain Assessment: 0-10  0-10 (Numeric) Pain Score: 8 (RN notified)  Pain Type: Acute pain  Pain Location: Foot  Pain Orientation: Right, Left (heels)  Cognition:  Cognition  Orientation Level: Disoriented to time    Postural Control:  Static Sitting Balance  Static Sitting-Balance Support: Feet supported  Static Sitting-Level of Assistance: Close supervision  Static Standing Balance  Static Standing-Balance Support: Bilateral upper extremity supported  Static Standing-Level of Assistance: Contact guard  Static Standing-Comment/Number of Minutes: Max VC for upright posture       Treatments:  Therapeutic Exercise  Therapeutic Exercise Performed: Yes  Therapeutic Exercise Activity 1: Pt performs seated x 15 reps alternating marches and LAQ. VC required for correct technique.         Bed Mobility  Bed Mobility: Yes  Bed Mobility 1  Bed Mobility 1: Supine to sitting  Level of Assistance 1: Contact guard  Bed Mobility Comments 1: Contact guard for safety. Pt able to use bed rail for trunk elevation. HOB elevated.    Ambulation/Gait Training  Ambulation/Gait Training Performed: Yes  Ambulation/Gait Training 1  Surface 1: Level tile  Device 1: Rolling walker  Gait Support Devices: Gait belt  Assistance 1: Minimum assistance  Quality of Gait 1: Shuffling gait, Forward flexed posture  Comments/Distance (ft) 1: 20 x 2 (Pt demonstrates slow precious,  and requires mod VC for reciprocal gait pattern. No overt LOB although WW management required.)  Transfers  Transfer: Yes  Transfer 1  Technique 1: Sit to stand, Stand to sit  Transfer Device 1: Walker, Gait belt  Transfer Level of Assistance 1: Moderate assistance  Trials/Comments 1: Max VC for correct hand placement. ModA for trunk elevation. Very slow to perform and effortful.    Outcome Measures:  [facility] Basic Mobility  Turning from your back to your side while in a flat bed  without using bedrails: A little  Moving from lying on your back to sitting on the side of a flat bed without using bedrails: A little  Moving to and from bed to chair (including a wheelchair): A little  Standing up from a chair using your arms (e.g. wheelchair or bedside chair): A lot  To walk in hospital room: A little  Climbing 3-5 steps with railing: Total  Basic Mobility - Total Score: 15    Education Documentation  Precautions, taught by Roxann Tanner PTA at 10/10/2024  3:38 PM.  Learner: Patient  Readiness: Acceptance  Method: Explanation  Response: Verbalizes Understanding    Body Mechanics, taught by Roxann Tanner PTA at 10/10/2024  3:38 PM.  Learner: Patient  Readiness: Acceptance  Method: Explanation  Response: Verbalizes Understanding    Mobility Training, taught by Roxann Tanner PTA at 10/10/2024  3:38 PM.  Learner: Patient  Readiness: Acceptance  Method: Explanation  Response: Verbalizes Understanding    Education Comments  No comments found.        Encounter Problems       Encounter Problems (Active)       Mobility       STG - Patient will ambulate 100 feet with FWW and CG (Progressing)       Start:  10/08/24    Expected End:  10/22/24               PT Transfers       STG - Transfer from bed to chair with FWW and supervision (Progressing)       Start:  10/08/24    Expected End:  10/22/24            STG - Patient to transfer to and from sit to supine Independently (Progressing)       Start:  10/08/24    Expected End:  10/22/24            STG - Patient will transfer sit to and from stand with MOD I (Not Progressing)       Start:  10/08/24    Expected End:  10/22/24               Pain - Adult

## 2024-10-10 NOTE — DISCHARGE SUMMARY
"Admitting Provider: Daphne Frazier MD  Discharge Provider: Itz Bergeron MD  Primary Care Physician at Discharge: Tiburcio Dueñas [phone]   Admission Date: 10/7/2024     Discharge Date: 10/10/2024  Current Planned Discharge Disposition: SNF    Discharge Diagnoses  Principal Problem:    Parkinson's disease (Multi)  Active Problems:    BPH with obstruction/lower urinary tract symptoms    Constipation    Hypertension, essential    CAD (coronary artery disease), native coronary artery    AF (atrial fibrillation) (Multi)      Hospital Course  Shashi Gutierrez is a 79 y.o. male presenting with \"confusion.\"     Shashi' son assists with his parkinson meds, but hadnt been able to the past few days; Shashi is usually able to take them on his own if need be, but didn't get around to it; he has mobility issues due to his parkinson. Apparently, when he misses doses of these meds, he becomes confused and malaised. When I saw him in ED the following day, he felt at his baseline. He did receive his meds just prior to coming to ED. Work-up here is unremarkable. He was given IV lasix for leg swelling; his BNP is normal; this is not HF, but venous insufficiency. PT recommended SNF; discharged there in stable condition.     Test Results Pending At Discharge  Pending Labs       No current pending labs.            Pertinent Physical Exam At Time of Discharge  /65 (BP Location: Left arm, Patient Position: Lying)   Pulse 62   Temp 36.6 °C (97.9 °F) (Temporal)   Resp 16   Ht 1.778 m (5' 10\")   Wt 90.3 kg (199 lb)   SpO2 96%   BMI 28.55 kg/m²   Physical Exam  Cardiovascular:      Rate and Rhythm: Normal rate and regular rhythm.      Heart sounds: Normal heart sounds.   Pulmonary:      Breath sounds: Normal breath sounds.   Abdominal:      General: Bowel sounds are normal.      Palpations: Abdomen is soft.   Musculoskeletal:         General: Normal range of motion.   Neurological:      General: No focal deficit present.    "   Mental Status: He is alert and oriented to person, place, and time.   Psychiatric:         Mood and Affect: Mood normal.         Home Medications     Medication List      CONTINUE taking these medications     acetaminophen 325 mg tablet; Commonly known as: Tylenol; Take 1 tablet   (325 mg) by mouth every 4 hours if needed for fever (temp greater than   38.0 C) or headaches.   albuterol 90 mcg/actuation inhaler; Commonly known as: ProAir HFA;   Inhale 2 puffs every 6 hours if needed for shortness of breath or   wheezing.   atorvastatin 20 mg tablet; Commonly known as: Lipitor; Take 1 tablet (20   mg) by mouth once daily.   carbidopa-levodopa  mg tablet; Commonly known as: Sinemet; Take 1   tablet by mouth 3 times a day.   CENTRUM SILVER ORAL   diclofenac sodium 1 % gel; Commonly known as: Voltaren; Apply 4.5 inches   (4 g) topically 4 times a day.   docusate sodium 100 mg capsule; Commonly known as: Colace; Take 1   capsule (100 mg) by mouth once daily.   fexofenadine 180 mg tablet; Commonly known as: Allegra; Take 1 tablet   (180 mg) by mouth once daily.   fluticasone 50 mcg/actuation nasal spray; Commonly known as: Flonase   furosemide 40 mg tablet; Commonly known as: Lasix; Take 0.5 tablets (20   mg) by mouth once daily.   lisinopril 5 mg tablet; Take 1 tablet (5 mg) by mouth once daily.   rivaroxaban 20 mg tablet; Commonly known as: Xarelto; Take 1 tablet (20   mg) by mouth once daily.   sennosides 8.6 mg tablet; Commonly known as: Senokot; Take 1 tablet (8.6   mg) by mouth 2 times a day.   sildenafil 50 mg tablet; Commonly known as: Viagra     STOP taking these medications     potassium chloride CR 10 mEq ER tablet; Commonly known as: Klor-Con       Outpatient Follow-Up  No future appointments.    Itz Bergeron MD    I spent more than 30 min coordinating this patient's discharge.

## 2024-10-10 NOTE — PROGRESS NOTES
Occupational Therapy    Occupational Therapy Treatment    Name: Shashi Gutierrez  MRN: [id number]  Department: [place] [address]  Room: [facility]609-  Date: 10/10/24  Time Calculation  Start Time: 1523  Stop Time: 1541  Time Calculation (min): 18 min    Assessment:  OT Assessment:  (OT Eval complete, patient is weak and deconditioned. Patient at risk for falls. Increased assist for ADLs due to decreased functional reach. Moderate intensity therapy to maximize functional independence with ADLs.)  Prognosis: Good  Barriers to Discharge: Decreased caregiver support  Evaluation/Treatment Tolerance: Patient limited by fatigue  Medical Staff Made Aware: Yes  End of Session Communication: Bedside nurse  End of Session Patient Position: Up in chair, Alarm on  Plan:  Treatment Interventions: ADL retraining, Functional transfer training, Endurance training, Patient/family training, Equipment evaluation/education, Neuromuscular reeducation  OT Frequency: 3 times per week  OT Discharge Recommendations: Moderate intensity level of continued care  Equipment Recommended upon Discharge: Wheeled walker  OT Recommended Transfer Status: Moderate assist  OT - OK to Discharge: Yes    Subjective   Previous Visit Info:  OT Last Visit  OT Received On: 10/10/24  General:  General  Reason for Referral:  (80 y/o male admitted with Parkinson's Disease.)  Referred By: Elyssa LITTLE)  Past Medical History Relevant to Rehab:   Past Medical History:   Diagnosis Date    Diuretic-induced hypokalemia 10/10/2023    ED (erectile dysfunction) 02/12/2023    Encounter for general adult medical examination without abnormal findings 03/04/2019    Encounter for annual health examination    Encounter for general adult medical examination without abnormal findings 01/03/2021    Encounter for annual health examination    Encounter for screening for malignant neoplasm of prostate     Encounter for prostate cancer screening    Internal hemorrhoid 09/14/2012    Kidney lesion  02/12/2023    Occasional tremors 02/12/2023    Onychomycosis 05/05/2023     Prior to Session Communication: Bedside nurse  Patient Position Received: Bed, 3 rail up, Alarm on  General Comment:  (Patient is pleasant and cooperative and motivated to regain prior level of independence.)  Precautions:  Medical Precautions: Fall precautions    Pain Assessment:  Pain Assessment  Pain Assessment: 0-10  0-10 (Numeric) Pain Score: 0 - No pain     Objective   Cognition:  Overall Cognitive Status: Impaired  Orientation Level: Disoriented to time  Processing Speed: Delayed    Activities of Daily Living:    Grooming  Grooming Level of Assistance: Close supervision  Grooming Where Assessed: Chair    UE Bathing  UE Bathing Level of Assistance: Minimum assistance  UE Bathing Where Assessed:  (Sitting in chair)  UE Bathing Comments:  (Assist to reach all areas.)    UE Dressing  UE Dressing Level of Assistance: Minimum assistance  UE Dressing Where Assessed: Chair  UE Dressing Comments:  (Assist to don hospital gown around back, increased time required.)    LE Dressing  LE Dressing: Yes  Pants Level of Assistance: Moderate assistance  Sock Level of Assistance: Maximum assistance  LE Dressing Where Assessed: Chair  LE Dressing Comments:  (Increased time required, assist to don pants over feet.)    Bed Mobility/Transfers: Bed Mobility  Bed Mobility: Yes  Bed Mobility 1  Bed Mobility 1: Sitting to supine  Level of Assistance 1: Moderate verbal cues  Bed Mobility Comments 1:  (Assist with LEs and upper trunk, increased time required.)    Transfers  Transfer: Yes  Transfer 1  Transfer From 1: Sit to  Transfer to 1: Stand  Technique 1: Sit to stand  Transfer Device 1: Walker  Transfer Level of Assistance 1: Moderate assistance  Trials/Comments 1:  (Cues for hand placement, retro lean and max cues for erect posture.)    Sitting Balance:  Static Sitting Balance  Static Sitting-Balance Support: Feet supported  Static Sitting-Level of  Assistance: Close supervision  Dynamic Sitting Balance  Dynamic Sitting-Balance Support: Feet supported  Dynamic Sitting-Level of Assistance: Minimum assistance  Dynamic Sitting-Balance: Forward lean  Standing Balance:  Static Standing Balance  Static Standing-Balance Support: Bilateral upper extremity supported  Static Standing-Level of Assistance: Moderate assistance    Outcome Measures:  [facility] Daily Activity  Putting on and taking off regular lower body clothing: A lot  Bathing (including washing, rinsing, drying): A lot  Putting on and taking off regular upper body clothing: A little  Toileting, which includes using toilet, bedpan or urinal: A lot  Taking care of personal grooming such as brushing teeth: A little  Eating Meals: None  Daily Activity - Total Score: 16    Goals:  Encounter Problems       Encounter Problems (Active)       ADLs       Patient will perform UB and LB bathing with independent level of assistance. (Progressing)       Start:  10/09/24    Expected End:  10/23/24            Patient with complete upper body dressing with independent level of assistance donning and doffing all UE clothes with no adaptive equipment while edge of bed  (Progressing)       Start:  10/09/24    Expected End:  10/23/24            Patient with complete lower body dressing with independent level of assistance donning and doffing all LE clothes  with reacher, sock-aid, and dressing stick  while standing (Progressing)       Start:  10/09/24    Expected End:  10/23/24            Patient will complete daily grooming tasks brushing teeth with independent level of assistance and PRN adaptive equipment while standing. (Progressing)       Start:  10/09/24    Expected End:  10/23/24            Patient will complete toileting including hygiene clothing management/hygiene with independent level of assistance and raised toilet seat. (Progressing)       Start:  10/09/24    Expected End:  10/23/24               BALANCE       Pt will  maintain dynamic standing balance during ADL task with independent level of assistance in order to demonstrate decreased risk of falling and improved postural control. (Progressing)       Start:  10/09/24    Expected End:  10/23/24               TRANSFERS       Patient will perform bed mobility independent level of assistance and bed rails in order to improve safety and independence with mobility (Progressing)       Start:  10/09/24    Expected End:  10/23/24            Patient will complete functional transfer to all surfaces with front wheeled walker with supervision level of assistance. (Progressing)       Start:  10/09/24    Expected End:  10/23/24

## 2024-10-10 NOTE — PROGRESS NOTES
10/10/2024 4:10 PM Kuldip Elizabeth notified that patient will be discharged at 6:30 PM to [facility]. Nika LEWIS

## 2024-10-10 NOTE — PROGRESS NOTES
Care Coordinator Note:    Plan: patient in with CHF. Med ready for dc . PT OT rec MOD. Son and patient agreeable. [facility] foc and can accept. Escalation team started auth today. [id number] - pending     Status: obs  Payor: [employer] dual  Disposition: home with son.  New snf placement. [facility]. Auth pending  Barrier: auth  ADOD: today vs tomorrow. Med ready     Namita Tejada [facility]      10/10/24 0922   Discharge Planning   Expected Discharge Disposition SNF

## 2024-10-10 NOTE — PROGRESS NOTES
"Shashi Gutierrez is a 79 y.o. male on day 0 of admission presenting with Parkinson's disease (Multi).    Assessment/Plan        Principal Problem:    Parkinson's disease (Multi)  Active Problems:    BPH with obstruction/lower urinary tract symptoms    Constipation    Hypertension, essential    CAD (coronary artery disease), native coronary artery    AF (atrial fibrillation) (Multi)  - heel pain could be due to pressure from being in bed a lot of the time; will put on prevlon boots  - son would like SNF; working on that; needs precert  - he otherwise seems to be at his baseline           Subjective   No issues overnight. Agreeable to SNF.          Objective     Physical Exam  Cardiovascular:      Rate and Rhythm: Normal rate and regular rhythm.      Heart sounds: Normal heart sounds.   Pulmonary:      Breath sounds: Normal breath sounds.   Abdominal:      General: Bowel sounds are normal.      Palpations: Abdomen is soft.   Musculoskeletal:         General: Normal range of motion.   Neurological:      General: No focal deficit present.      Mental Status: He is alert and oriented to person, place, and time.   Psychiatric:         Mood and Affect: Mood normal.         Last Recorded Vitals  Blood pressure 135/65, pulse 62, temperature 36.6 °C (97.9 °F), temperature source Temporal, resp. rate 16, height 1.778 m (5' 10\"), weight 90.3 kg (199 lb), SpO2 96%.  Intake/Output last 3 Shifts:  I/O last 3 completed shifts:  In: 240 (2.7 mL/kg) [P.O.:240]  Out: 1550 (17.2 mL/kg) [Urine:1550 (0.5 mL/kg/hr)]  Weight: 90.3 kg     Relevant Results                             I spent 35 minutes in the professional and overall care of this patient.      Itz Bergeron MD  "

## 2024-10-11 ENCOUNTER — NURSING HOME VISIT (OUTPATIENT)
Dept: POST ACUTE CARE | Facility: EXTERNAL LOCATION | Age: 79
End: 2024-10-11
Payer: COMMERCIAL

## 2024-10-11 DIAGNOSIS — I48.91 ATRIAL FIBRILLATION, UNSPECIFIED TYPE (MULTI): Chronic | ICD-10-CM

## 2024-10-11 DIAGNOSIS — I10 HYPERTENSION, ESSENTIAL: ICD-10-CM

## 2024-10-11 DIAGNOSIS — I50.9 CHRONIC CONGESTIVE HEART FAILURE, UNSPECIFIED HEART FAILURE TYPE: ICD-10-CM

## 2024-10-11 DIAGNOSIS — G20.A1 PARKINSON'S DISEASE WITHOUT DYSKINESIA, UNSPECIFIED WHETHER MANIFESTATIONS FLUCTUATE: ICD-10-CM

## 2024-10-11 DIAGNOSIS — R53.1 WEAKNESS: Primary | ICD-10-CM

## 2024-10-11 PROCEDURE — 99309 SBSQ NF CARE MODERATE MDM 30: CPT | Performed by: NURSE PRACTITIONER

## 2024-10-11 PROCEDURE — G0378 HOSPITAL OBSERVATION PER HR: HCPCS

## 2024-10-11 NOTE — LETTER
Patient: Shashi Gutierrez  : 1945    Encounter Date: 10/11/2024    PROGRESS NOTE    Subjective  Chief complaint: Shashi Gutierrez is a 79 y.o. male who is an acute skilled patient being seen and evaluated for weakness    HPI: recently admitted to this facility for rehabilitation.   Is  pleasant and talkative. Poor historian. Reports that he is feeling better.   He is unable to recall the reason that he was taking to the hospital.   Is scheduled for evaluation by therapy this morning. Just finished breakfast. Has a good appetite. Reviewed medications, labs ordered.   HPI      Objective  Vital signs: 149/65-77-18-98.4     Physical Exam  Vitals and nursing note reviewed.   Constitutional:       General: He is not in acute distress.     Appearance: He is well-groomed and normal weight.   Eyes:      Extraocular Movements: Extraocular movements intact.   Cardiovascular:      Rate and Rhythm: Normal rate and regular rhythm.   Pulmonary:      Effort: Pulmonary effort is normal.      Breath sounds: Normal breath sounds.      Comments: Lungs clear   Abdominal:      General: Bowel sounds are normal.      Palpations: Abdomen is soft.   Musculoskeletal:      Cervical back: Neck supple.      Comments: +2 dependent edema in lower legs   Good muscle tone in arms and legs    Neurological:      Mental Status: He is alert.   Psychiatric:         Mood and Affect: Mood normal.         Behavior: Behavior is cooperative.         Cognition and Memory: Cognition is impaired.      Comments: Poor historian          Assessment/Plan  Problem List Items Addressed This Visit       CHF (congestive heart failure)     lisinopril   Sildenafil  Furosemide   Monitor BP  Monitor lung congestion/edema          Hypertension, essential     Lisinopril   Furosemide   Monitor BP         Parkinson's disease (Multi)     Requires rehabilitation   Carbidopa levodopa             AF (atrial fibrillation) (Multi) (Chronic)     Heart rate controlled  Anticoagulant  Xarelto 20mg  Monitor for bleeding         Weakness - Primary     Requires rehabilitation   Monitor progress  Motivated to return home           Medications, treatments, and labs reviewed  Continue medications and treatments as listed in EMR    TERENCE Chung      Electronically Signed By: TERENCE Chung   10/13/24  6:33 PM

## 2024-10-13 NOTE — PROGRESS NOTES
PROGRESS NOTE    Subjective   Chief complaint: Shashi Gutierrez is a 79 y.o. male who is an acute skilled patient being seen and evaluated for weakness    HPI: recently admitted to this facility for rehabilitation.   Is  pleasant and talkative. Poor historian. Reports that he is feeling better.   He is unable to recall the reason that he was taking to the hospital.   Is scheduled for evaluation by therapy this morning. Just finished breakfast. Has a good appetite. Reviewed medications, labs ordered.   HPI      Objective   Vital signs: 149/65-77-18-98.4     Physical Exam  Vitals and nursing note reviewed.   Constitutional:       General: He is not in acute distress.     Appearance: He is well-groomed and normal weight.   Eyes:      Extraocular Movements: Extraocular movements intact.   Cardiovascular:      Rate and Rhythm: Normal rate and regular rhythm.   Pulmonary:      Effort: Pulmonary effort is normal.      Breath sounds: Normal breath sounds.      Comments: Lungs clear   Abdominal:      General: Bowel sounds are normal.      Palpations: Abdomen is soft.   Musculoskeletal:      Cervical back: Neck supple.      Comments: +2 dependent edema in lower legs   Good muscle tone in arms and legs    Neurological:      Mental Status: He is alert.   Psychiatric:         Mood and Affect: Mood normal.         Behavior: Behavior is cooperative.         Cognition and Memory: Cognition is impaired.      Comments: Poor historian          Assessment/Plan   Problem List Items Addressed This Visit       CHF (congestive heart failure)     lisinopril   Sildenafil  Furosemide   Monitor BP  Monitor lung congestion/edema          Hypertension, essential     Lisinopril   Furosemide   Monitor BP         Parkinson's disease (Multi)     Requires rehabilitation   Carbidopa levodopa             AF (atrial fibrillation) (Multi) (Chronic)     Heart rate controlled  Anticoagulant Xarelto 20mg  Monitor for bleeding         Weakness - Primary      Requires rehabilitation   Monitor progress  Motivated to return home           Medications, treatments, and labs reviewed  Continue medications and treatments as listed in EMR    Zaira Fleming, APRN-CNP

## 2024-10-15 ENCOUNTER — NURSING HOME VISIT (OUTPATIENT)
Dept: POST ACUTE CARE | Facility: EXTERNAL LOCATION | Age: 79
End: 2024-10-15
Payer: COMMERCIAL

## 2024-10-15 DIAGNOSIS — I48.91 ATRIAL FIBRILLATION, UNSPECIFIED TYPE (MULTI): Chronic | ICD-10-CM

## 2024-10-15 DIAGNOSIS — N13.8 BPH WITH OBSTRUCTION/LOWER URINARY TRACT SYMPTOMS: ICD-10-CM

## 2024-10-15 DIAGNOSIS — G20.A1 PARKINSON'S DISEASE WITHOUT DYSKINESIA, UNSPECIFIED WHETHER MANIFESTATIONS FLUCTUATE: Primary | ICD-10-CM

## 2024-10-15 DIAGNOSIS — N40.1 BPH WITH OBSTRUCTION/LOWER URINARY TRACT SYMPTOMS: ICD-10-CM

## 2024-10-15 DIAGNOSIS — E78.5 HYPERLIPIDEMIA, UNSPECIFIED HYPERLIPIDEMIA TYPE: ICD-10-CM

## 2024-10-15 DIAGNOSIS — I11.0 HYPERTENSIVE HEART DISEASE WITH HEART FAILURE: ICD-10-CM

## 2024-10-15 PROCEDURE — 99305 1ST NF CARE MODERATE MDM 35: CPT | Performed by: INTERNAL MEDICINE

## 2024-10-15 NOTE — LETTER
Patient: Shashi Gutierrez  : 1945    Encounter Date: 10/15/2024    HISTORY & PHYSICAL    Subjective  Chief complaint: Shashi Gutierrez is a 79 y.o. male who is being seen and evaluated for multiple medical problems.  Patient admitted to SNF for therapy due to weakness after recent hospitalization.    HPI:  HPI  Patient is a 79-year-old male presenting for admission to nursing facility following a hospitalization.  Patient had presented to ED due to confusion Per family.  Patient has a past history significant for Parkinson's, BPH, hypertension, A-fib, CAD, prostate cancer, hyperlipidemia, CKD.  Patient was noted to have bilateral lower extremity edema and was diuresed with normal BMP.  Patient was evaluated by PT OT with recommendations for further therapy at SNF.  Patient was hemodynamically stable to discharge to SNF.  Patient was seen and examined at the bedside, appears to be in no acute distress.  Patient denies chest pain, shortness of breath, nausea vomiting fever chills.  Past Medical History:   Diagnosis Date   • Atrial fibrillation (Multi)    • BPH (benign prostatic hyperplasia)    • CAD (coronary artery disease)    • CKD (chronic kidney disease)    • Diuretic-induced hypokalemia 10/10/2023   • ED (erectile dysfunction) 2023   • Encounter for general adult medical examination without abnormal findings 2019    Encounter for annual health examination   • Encounter for general adult medical examination without abnormal findings 2021    Encounter for annual health examination   • Encounter for screening for malignant neoplasm of prostate     Encounter for prostate cancer screening   • Hypertension    • Internal hemorrhoid 2012   • Kidney lesion 2023   • Occasional tremors 2023   • Onychomycosis 2023       Past Surgical History:   Procedure Laterality Date   • CT ANGIO CORONARY ART WITH HEARTFLOW IF SCORE >30%  2018    CT HEART CORONARY ANGIOGRAM 2018 [facility]  EMERGENCY LEGACY       Family History   Problem Relation Name Age of Onset   • No Known Problems Other         Social History     Socioeconomic History   • Marital status:    Tobacco Use   • Smoking status: Former     Types: Cigars     Quit date:      Years since quittin.8   • Smokeless tobacco: Never   Substance and Sexual Activity   • Alcohol use: Not Currently   • Drug use: Never     Social Determinants of Health     Financial Resource Strain: Low Risk  (10/8/2024)    Overall Financial Resource Strain (CARDIA)    • Difficulty of Paying Living Expenses: Not hard at all   Transportation Needs: No Transportation Needs (10/8/2024)    PRAPARE - Transportation    • Lack of Transportation (Medical): No    • Lack of Transportation (Non-Medical): No   Housing Stability: Low Risk  (10/8/2024)    Housing Stability Vital Sign    • Unable to Pay for Housing in the Last Year: No    • Number of Times Moved in the Last Year: 1    • Homeless in the Last Year: No       Vital signs: 121/71, 97.7, 63, 16, 97%    Objective  Physical Exam  Constitutional:       General: He is not in acute distress.  Eyes:      Extraocular Movements: Extraocular movements intact.   Cardiovascular:      Rate and Rhythm: Normal rate and regular rhythm.   Pulmonary:      Effort: Pulmonary effort is normal.      Breath sounds: Normal breath sounds.   Abdominal:      General: Bowel sounds are normal.      Palpations: Abdomen is soft.   Musculoskeletal:      Cervical back: Neck supple.      Right lower leg: Edema present.      Left lower leg: Edema present.   Neurological:      Mental Status: He is alert.   Psychiatric:         Mood and Affect: Mood normal.         Behavior: Behavior is cooperative.         Assessment/Plan  Problem List Items Addressed This Visit       BPH with obstruction/lower urinary tract symptoms     Monitor  symptoms         Hyperlipidemia     Statin  Monitor lipids and LFTs         Hypertensive heart disease with  heart failure     Monitor blood pressure  Lisinopril  Furosemide  Monitor for shortness of breath, weight gain or edema         Parkinson's disease (Multi) - Primary     Carbidopa levodopa  Monitor for changes         AF (atrial fibrillation) (Multi) (Chronic)     Rivaroxaban  Monitor heart rate  Bleeding precautions          Hospital records reviewed  Medications, treatments, and labs reviewed  Continue medications and treatments as listed in EMR  Discussed with nursing and therapy      Scribe Attestation  IMisty Scribe   attest that this documentation has been prepared under the direction and in the presence of Tiburcio Dueñas MD    Provider Attestation - Scribe documentation  All medical record entries made by the Scribe were at my direction and personally dictated by me. I have reviewed the chart and agree that the record accurately reflects my personal performance of the history, physical exam, discussion and plan.   Tiburcio Dueñas MD      Electronically Signed By: Tiburcio Dueñas MD   10/16/24  1:55 PM

## 2024-10-15 NOTE — PROGRESS NOTES
HISTORY & PHYSICAL    Subjective   Chief complaint: Shashi Gutierrez is a 79 y.o. male who is being seen and evaluated for multiple medical problems.  Patient admitted to SNF for therapy due to weakness after recent hospitalization.    HPI:  HPI  Patient is a 79-year-old male presenting for admission to nursing facility following a hospitalization.  Patient had presented to ED due to confusion Per family.  Patient has a past history significant for Parkinson's, BPH, hypertension, A-fib, CAD, prostate cancer, hyperlipidemia, CKD.  Patient was noted to have bilateral lower extremity edema and was diuresed with normal BMP.  Patient was evaluated by PT OT with recommendations for further therapy at SNF.  Patient was hemodynamically stable to discharge to SNF.  Patient was seen and examined at the bedside, appears to be in no acute distress.  Patient denies chest pain, shortness of breath, nausea vomiting fever chills.  Past Medical History:   Diagnosis Date    Atrial fibrillation (Multi)     BPH (benign prostatic hyperplasia)     CAD (coronary artery disease)     CKD (chronic kidney disease)     Diuretic-induced hypokalemia 10/10/2023    ED (erectile dysfunction) 02/12/2023    Encounter for general adult medical examination without abnormal findings 03/04/2019    Encounter for annual health examination    Encounter for general adult medical examination without abnormal findings 01/03/2021    Encounter for annual health examination    Encounter for screening for malignant neoplasm of prostate     Encounter for prostate cancer screening    Hypertension     Internal hemorrhoid 09/14/2012    Kidney lesion 02/12/2023    Occasional tremors 02/12/2023    Onychomycosis 05/05/2023       Past Surgical History:   Procedure Laterality Date    CT ANGIO CORONARY ART WITH HEARTFLOW IF SCORE >30%  8/27/2018    CT HEART CORONARY ANGIOGRAM 8/27/2018 [facility] EMERGENCY LEGACY       Family History   Problem Relation Name Age of Onset    No Known  Problems Other         Social History     Socioeconomic History    Marital status:    Tobacco Use    Smoking status: Former     Types: Cigars     Quit date:      Years since quittin.8    Smokeless tobacco: Never   Substance and Sexual Activity    Alcohol use: Not Currently    Drug use: Never     Social Determinants of Health     Financial Resource Strain: Low Risk  (10/8/2024)    Overall Financial Resource Strain (CARDIA)     Difficulty of Paying Living Expenses: Not hard at all   Transportation Needs: No Transportation Needs (10/8/2024)    PRAPARE - Transportation     Lack of Transportation (Medical): No     Lack of Transportation (Non-Medical): No   Housing Stability: Low Risk  (10/8/2024)    Housing Stability Vital Sign     Unable to Pay for Housing in the Last Year: No     Number of Times Moved in the Last Year: 1     Homeless in the Last Year: No       Vital signs: 121/71, 97.7, 63, 16, 97%    Objective   Physical Exam  Constitutional:       General: He is not in acute distress.  Eyes:      Extraocular Movements: Extraocular movements intact.   Cardiovascular:      Rate and Rhythm: Normal rate and regular rhythm.   Pulmonary:      Effort: Pulmonary effort is normal.      Breath sounds: Normal breath sounds.   Abdominal:      General: Bowel sounds are normal.      Palpations: Abdomen is soft.   Musculoskeletal:      Cervical back: Neck supple.      Right lower leg: Edema present.      Left lower leg: Edema present.   Neurological:      Mental Status: He is alert.   Psychiatric:         Mood and Affect: Mood normal.         Behavior: Behavior is cooperative.         Assessment/Plan   Problem List Items Addressed This Visit       BPH with obstruction/lower urinary tract symptoms     Monitor  symptoms         Hyperlipidemia     Statin  Monitor lipids and LFTs         Hypertensive heart disease with heart failure     Monitor blood pressure  Lisinopril  Furosemide  Monitor for shortness of breath,  weight gain or edema         Parkinson's disease (Multi) - Primary     Carbidopa levodopa  Monitor for changes         AF (atrial fibrillation) (Multi) (Chronic)     Rivaroxaban  Monitor heart rate  Bleeding precautions          Hospital records reviewed  Medications, treatments, and labs reviewed  Continue medications and treatments as listed in EMR  Discussed with nursing and therapy      Scribe Attestation  Misty FLORES Scribe   attest that this documentation has been prepared under the direction and in the presence of Tiburcio Dueñas MD    Provider Attestation - Scribe documentation  All medical record entries made by the Scribe were at my direction and personally dictated by me. I have reviewed the chart and agree that the record accurately reflects my personal performance of the history, physical exam, discussion and plan.   Tiburcio Dueñas MD

## 2024-10-15 NOTE — ASSESSMENT & PLAN NOTE
Monitor blood pressure  Lisinopril  Furosemide  Monitor for shortness of breath, weight gain or edema

## 2024-10-17 ENCOUNTER — NURSING HOME VISIT (OUTPATIENT)
Dept: POST ACUTE CARE | Facility: EXTERNAL LOCATION | Age: 79
End: 2024-10-17
Payer: COMMERCIAL

## 2024-10-17 DIAGNOSIS — I48.91 ATRIAL FIBRILLATION, UNSPECIFIED TYPE (MULTI): Chronic | ICD-10-CM

## 2024-10-17 DIAGNOSIS — I11.0 HYPERTENSIVE HEART DISEASE WITH HEART FAILURE: Primary | ICD-10-CM

## 2024-10-17 DIAGNOSIS — R53.1 WEAKNESS: ICD-10-CM

## 2024-10-17 DIAGNOSIS — G20.A1 PARKINSON'S DISEASE WITHOUT DYSKINESIA, UNSPECIFIED WHETHER MANIFESTATIONS FLUCTUATE: ICD-10-CM

## 2024-10-17 PROCEDURE — 99308 SBSQ NF CARE LOW MDM 20: CPT | Performed by: INTERNAL MEDICINE

## 2024-10-17 NOTE — PROGRESS NOTES
PROGRESS NOTE    Subjective   Chief complaint: Shashi Gutierrez is a 79 y.o. male who is an acute skilled patient being seen and evaluated for weakness    HPI:  HPI  Patient is currently working with therapy due to generalized weakness.  Therapy is working patient on performing therapeutic exercise and activities, no muscular education, ADLs.  Patient appears to be in no acute distress.  Denies chest pain, shortness of breath, nausea vomiting fever chills    Objective   Vital signs: 128/78, 97.8, 71, 17, 97%    Physical Exam  Constitutional:       General: He is not in acute distress.  Eyes:      Extraocular Movements: Extraocular movements intact.   Cardiovascular:      Rate and Rhythm: Normal rate and regular rhythm.   Pulmonary:      Effort: Pulmonary effort is normal.      Breath sounds: Normal breath sounds.   Abdominal:      General: Bowel sounds are normal.      Palpations: Abdomen is soft.   Musculoskeletal:      Cervical back: Neck supple.      Right lower leg: Edema present.      Left lower leg: Edema present.   Neurological:      Mental Status: He is alert.   Psychiatric:         Mood and Affect: Mood normal.         Behavior: Behavior is cooperative.         Assessment/Plan   Problem List Items Addressed This Visit       Hypertensive heart disease with heart failure - Primary     Monitor blood pressure  Lisinopril  Furosemide  Monitor for shortness of breath, weight gain or edema         Parkinson's disease (Multi)     Carbidopa levodopa  Monitor for changes         AF (atrial fibrillation) (Multi) (Chronic)     Rivaroxaban  Monitor heart rate  Bleeding precautions         Weakness     Continue therapy, work towards goals          Medications, treatments, and labs reviewed  Continue medications and treatments as listed in EMR      Scribe Attestation  I, Ayse Tinoco   attest that this documentation has been prepared under the direction and in the presence of Tiburcio Dueñas MD    Provider  Attestation - Scribe documentation  All medical record entries made by the Scribe were at my direction and personally dictated by me. I have reviewed the chart and agree that the record accurately reflects my personal performance of the history, physical exam, discussion and plan.   Tiburcio Dueñas MD

## 2024-10-17 NOTE — LETTER
Patient: Shashi Gutierrez  : 1945    Encounter Date: 10/17/2024    PROGRESS NOTE    Subjective  Chief complaint: Shashi Gutierrez is a 79 y.o. male who is an acute skilled patient being seen and evaluated for weakness    HPI:  HPI  Patient is currently working with therapy due to generalized weakness.  Therapy is working patient on performing therapeutic exercise and activities, no muscular education, ADLs.  Patient appears to be in no acute distress.  Denies chest pain, shortness of breath, nausea vomiting fever chills    Objective  Vital signs: 128/78, 97.8, 71, 17, 97%    Physical Exam  Constitutional:       General: He is not in acute distress.  Eyes:      Extraocular Movements: Extraocular movements intact.   Cardiovascular:      Rate and Rhythm: Normal rate and regular rhythm.   Pulmonary:      Effort: Pulmonary effort is normal.      Breath sounds: Normal breath sounds.   Abdominal:      General: Bowel sounds are normal.      Palpations: Abdomen is soft.   Musculoskeletal:      Cervical back: Neck supple.      Right lower leg: Edema present.      Left lower leg: Edema present.   Neurological:      Mental Status: He is alert.   Psychiatric:         Mood and Affect: Mood normal.         Behavior: Behavior is cooperative.         Assessment/Plan  Problem List Items Addressed This Visit       Hypertensive heart disease with heart failure - Primary     Monitor blood pressure  Lisinopril  Furosemide  Monitor for shortness of breath, weight gain or edema         Parkinson's disease (Multi)     Carbidopa levodopa  Monitor for changes         AF (atrial fibrillation) (Multi) (Chronic)     Rivaroxaban  Monitor heart rate  Bleeding precautions         Weakness     Continue therapy, work towards goals          Medications, treatments, and labs reviewed  Continue medications and treatments as listed in EMR      Scribe Attestation  I, Ayse Tinoco   attest that this documentation has been prepared under the  direction and in the presence of Tiburcio Dueñas MD    Provider Attestation - Scribe documentation  All medical record entries made by the Scribe were at my direction and personally dictated by me. I have reviewed the chart and agree that the record accurately reflects my personal performance of the history, physical exam, discussion and plan.   Tiburcio Dueñas MD        Electronically Signed By: Tiburcio Dueñas MD   10/18/24  4:05 PM

## 2024-10-18 ENCOUNTER — NURSING HOME VISIT (OUTPATIENT)
Dept: POST ACUTE CARE | Facility: EXTERNAL LOCATION | Age: 79
End: 2024-10-18
Payer: COMMERCIAL

## 2024-10-18 DIAGNOSIS — I48.91 ATRIAL FIBRILLATION, UNSPECIFIED TYPE (MULTI): Chronic | ICD-10-CM

## 2024-10-18 DIAGNOSIS — I50.9 CHRONIC CONGESTIVE HEART FAILURE, UNSPECIFIED HEART FAILURE TYPE: ICD-10-CM

## 2024-10-18 DIAGNOSIS — N13.8 BPH WITH OBSTRUCTION/LOWER URINARY TRACT SYMPTOMS: ICD-10-CM

## 2024-10-18 DIAGNOSIS — R53.1 WEAKNESS: Primary | ICD-10-CM

## 2024-10-18 DIAGNOSIS — N40.1 BPH WITH OBSTRUCTION/LOWER URINARY TRACT SYMPTOMS: ICD-10-CM

## 2024-10-18 PROCEDURE — 99309 SBSQ NF CARE MODERATE MDM 30: CPT | Performed by: NURSE PRACTITIONER

## 2024-10-19 PROBLEM — I11.0 HYPERTENSIVE HEART DISEASE WITH HEART FAILURE: Status: RESOLVED | Noted: 2023-02-12 | Resolved: 2024-10-19

## 2024-10-19 NOTE — PROGRESS NOTES
"PROGRESS NOTE    Subjective   Chief complaint: Shashi Gutierrez is a 79 y.o. male who is an acute skilled patient being seen and evaluated for weakness    HPI: remains in therapy. The therapist reports that he is making good progress. He is in bed napping. Reports that he is \"worn out\" by therapy. He denies  any pain or discomfort  Nursing reports that his appetite is consistent.   Reviewed medications.   HPI      Objective   Vital signs: 129/76-77-18-98.6    Physical Exam  Vitals and nursing note reviewed.   Constitutional:       General: He is not in acute distress.     Appearance: He is well-groomed and normal weight.   Eyes:      Extraocular Movements: Extraocular movements intact.   Cardiovascular:      Rate and Rhythm: Normal rate and regular rhythm.   Pulmonary:      Effort: Pulmonary effort is normal.      Breath sounds: Normal breath sounds.      Comments: Lungs clear   Abdominal:      General: Bowel sounds are normal.      Palpations: Abdomen is soft.   Musculoskeletal:      Cervical back: Neck supple.      Comments: +2 dependent edema in lower legs   Good muscle tone in arms and legs    Neurological:      Mental Status: He is alert.   Psychiatric:         Mood and Affect: Mood normal.         Behavior: Behavior is cooperative.         Cognition and Memory: Cognition is impaired.      Comments: Poor historian          Assessment/Plan   Problem List Items Addressed This Visit       BPH with obstruction/lower urinary tract symptoms     Monitor  symptoms  Reports no difficulty voiding          CHF (congestive heart failure)     lisinopril   Sildenafil  Furosemide   Monitor BP  Monitor lung congestion/edema          AF (atrial fibrillation) (Multi) (Chronic)     Rivaroxaban  Monitor heart rate  Bleeding precautionsRivaroxaban  Monitor heart rate  Bleeding precautions         Weakness - Primary     Continue therapy, work towards goals          Medications, treatments, and labs reviewed  Continue medications and " treatments as listed in EMR    Zaira Fleming, APRN-CNP

## 2024-10-19 NOTE — ASSESSMENT & PLAN NOTE
Rivaroxaban  Monitor heart rate  Bleeding precautionsRivaroxaban  Monitor heart rate  Bleeding precautions

## 2024-10-22 ENCOUNTER — NURSING HOME VISIT (OUTPATIENT)
Dept: POST ACUTE CARE | Facility: EXTERNAL LOCATION | Age: 79
End: 2024-10-22
Payer: COMMERCIAL

## 2024-10-22 DIAGNOSIS — G20.A1 PARKINSON'S DISEASE WITHOUT DYSKINESIA, UNSPECIFIED WHETHER MANIFESTATIONS FLUCTUATE: ICD-10-CM

## 2024-10-22 DIAGNOSIS — I50.9 CHRONIC CONGESTIVE HEART FAILURE, UNSPECIFIED HEART FAILURE TYPE: ICD-10-CM

## 2024-10-22 DIAGNOSIS — N13.8 BPH WITH OBSTRUCTION/LOWER URINARY TRACT SYMPTOMS: ICD-10-CM

## 2024-10-22 DIAGNOSIS — R53.1 WEAKNESS: ICD-10-CM

## 2024-10-22 DIAGNOSIS — L60.2 LONG TOENAIL: Primary | ICD-10-CM

## 2024-10-22 DIAGNOSIS — N40.1 BPH WITH OBSTRUCTION/LOWER URINARY TRACT SYMPTOMS: ICD-10-CM

## 2024-10-22 PROCEDURE — 99308 SBSQ NF CARE LOW MDM 20: CPT | Performed by: INTERNAL MEDICINE

## 2024-10-22 NOTE — PROGRESS NOTES
PROGRESS NOTE    Subjective   Chief complaint: Shashi Gutierrez is a 79 y.o. male who is an acute skilled patient being seen and evaluated for weakness    HPI:  HPI  Patient is currently working with therapy due to generalized weakness.  Therapies continue to work with patient on performing therapeutic exercise and activities, neuromuscular education, ADLs and self-care.  Patient is working towards goals.  Patient appears to be in no acute distress.  Denies chest pain, shortness of breath, nausea vomiting fever chills.  Patient did request to have podiatry consult to cut toenails.  Orders placed.    Objective   Vital signs: 104/60, 97.9, 70, 18, 99%    Physical Exam  Constitutional:       General: He is not in acute distress.  Eyes:      Extraocular Movements: Extraocular movements intact.   Cardiovascular:      Rate and Rhythm: Normal rate and regular rhythm.   Pulmonary:      Effort: Pulmonary effort is normal.      Breath sounds: Normal breath sounds.   Abdominal:      General: Bowel sounds are normal.      Palpations: Abdomen is soft.   Musculoskeletal:      Cervical back: Neck supple.      Right lower leg: Edema present.      Left lower leg: Edema present.   Neurological:      Mental Status: He is alert.   Psychiatric:         Mood and Affect: Mood normal.         Behavior: Behavior is cooperative.         Assessment/Plan   Problem List Items Addressed This Visit       BPH with obstruction/lower urinary tract symptoms     Monitor  symptoms  Reports no difficulty voiding          CHF (congestive heart failure)     lisinopril   Sildenafil  Furosemide   Monitor BP  Monitor lung congestion/edema          Parkinson's disease (Multi)     Carbidopa levodopa  Monitor for changes  Therapy per POC         Weakness     Continue progressing in therapy towards goals         Long toenail - Primary     Podiatry consult          Medications, treatments, and labs reviewed  Continue medications and treatments as listed in  EMR      Scribe Attestation  I, Ayse Tinoco   attest that this documentation has been prepared under the direction and in the presence of Tiburcio Dueñas MD    Provider Attestation - Scribe documentation  All medical record entries made by the Scribe were at my direction and personally dictated by me. I have reviewed the chart and agree that the record accurately reflects my personal performance of the history, physical exam, discussion and plan.   Tiburcio Dueñas MD

## 2024-10-22 NOTE — LETTER
Patient: Shashi Gutierrez  : 1945    Encounter Date: 10/22/2024    PROGRESS NOTE    Subjective  Chief complaint: Shashi Gutierrez is a 79 y.o. male who is an acute skilled patient being seen and evaluated for weakness    HPI:  HPI  Patient is currently working with therapy due to generalized weakness.  Therapies continue to work with patient on performing therapeutic exercise and activities, neuromuscular education, ADLs and self-care.  Patient is working towards goals.  Patient appears to be in no acute distress.  Denies chest pain, shortness of breath, nausea vomiting fever chills.  Patient did request to have podiatry consult to cut toenails.  Orders placed.    Objective  Vital signs: 104/60, 97.9, 70, 18, 99%    Physical Exam  Constitutional:       General: He is not in acute distress.  Eyes:      Extraocular Movements: Extraocular movements intact.   Cardiovascular:      Rate and Rhythm: Normal rate and regular rhythm.   Pulmonary:      Effort: Pulmonary effort is normal.      Breath sounds: Normal breath sounds.   Abdominal:      General: Bowel sounds are normal.      Palpations: Abdomen is soft.   Musculoskeletal:      Cervical back: Neck supple.      Right lower leg: Edema present.      Left lower leg: Edema present.   Neurological:      Mental Status: He is alert.   Psychiatric:         Mood and Affect: Mood normal.         Behavior: Behavior is cooperative.         Assessment/Plan  Problem List Items Addressed This Visit       BPH with obstruction/lower urinary tract symptoms     Monitor  symptoms  Reports no difficulty voiding          CHF (congestive heart failure)     lisinopril   Sildenafil  Furosemide   Monitor BP  Monitor lung congestion/edema          Parkinson's disease (Multi)     Carbidopa levodopa  Monitor for changes  Therapy per POC         Weakness     Continue progressing in therapy towards goals         Long toenail - Primary     Podiatry consult          Medications, treatments, and labs  reviewed  Continue medications and treatments as listed in EMR      Scribe Attestation  I, Ayse Tinoco   attest that this documentation has been prepared under the direction and in the presence of Tiburcio Dueñas MD    Provider Attestation - Scribe documentation  All medical record entries made by the Scribe were at my direction and personally dictated by me. I have reviewed the chart and agree that the record accurately reflects my personal performance of the history, physical exam, discussion and plan.   Tiburcio Dueñas MD        Electronically Signed By: Tiburcio Dueñas MD   10/23/24  3:54 PM

## 2024-10-24 ENCOUNTER — NURSING HOME VISIT (OUTPATIENT)
Dept: POST ACUTE CARE | Facility: EXTERNAL LOCATION | Age: 79
End: 2024-10-24
Payer: COMMERCIAL

## 2024-10-24 DIAGNOSIS — I48.91 ATRIAL FIBRILLATION, UNSPECIFIED TYPE (MULTI): Chronic | ICD-10-CM

## 2024-10-24 DIAGNOSIS — N40.1 BPH WITH OBSTRUCTION/LOWER URINARY TRACT SYMPTOMS: ICD-10-CM

## 2024-10-24 DIAGNOSIS — G20.A1 PARKINSON'S DISEASE WITHOUT DYSKINESIA, UNSPECIFIED WHETHER MANIFESTATIONS FLUCTUATE: Primary | ICD-10-CM

## 2024-10-24 DIAGNOSIS — R53.1 WEAKNESS: ICD-10-CM

## 2024-10-24 DIAGNOSIS — N13.8 BPH WITH OBSTRUCTION/LOWER URINARY TRACT SYMPTOMS: ICD-10-CM

## 2024-10-24 DIAGNOSIS — I50.9 CHRONIC CONGESTIVE HEART FAILURE, UNSPECIFIED HEART FAILURE TYPE: ICD-10-CM

## 2024-10-24 PROCEDURE — 99308 SBSQ NF CARE LOW MDM 20: CPT | Performed by: INTERNAL MEDICINE

## 2024-10-24 NOTE — PROGRESS NOTES
PROGRESS NOTE    Subjective   Chief complaint: Shashi Gutierrez is a 79 y.o. male who is an acute skilled patient being seen and evaluated for weakness    HPI:  HPI  Patient has been in skilled nursing facility, working in therapy following hospitalization.  Patient has made improvements with therapy.  Patient is planning to discharge home today.  Patient is seen and examined at the bedside, appears to be in no acute distress.  Patient denies nausea vomiting fever chills.  Patient reporting no further questions or concerns at this time.  Nursing staff voicing no new concerns.    Objective   Vital signs: 132/73, 97.8, 80, 18, 96%    Physical Exam  Constitutional:       General: He is not in acute distress.  Eyes:      Extraocular Movements: Extraocular movements intact.   Cardiovascular:      Rate and Rhythm: Normal rate and regular rhythm.   Pulmonary:      Effort: Pulmonary effort is normal.      Breath sounds: Normal breath sounds.   Abdominal:      General: Bowel sounds are normal.      Palpations: Abdomen is soft.   Musculoskeletal:      Cervical back: Neck supple.      Right lower leg: Edema present.      Left lower leg: Edema present.   Neurological:      Mental Status: He is alert.   Psychiatric:         Mood and Affect: Mood normal.         Behavior: Behavior is cooperative.       ADMITTING/DISCHARGE DIAGNOSES:  Assessment/Plan   Problem List Items Addressed This Visit       BPH with obstruction/lower urinary tract symptoms     Monitor  symptoms  Reports no difficulty voiding          CHF (congestive heart failure)     lisinopril   Sildenafil  Furosemide   Monitor BP  Monitor lung congestion/edema          Parkinson's disease (Multi) - Primary     Carbidopa levodopa           AF (atrial fibrillation) (Multi) (Chronic)     Rivaroxaban  Monitor heart rate  Bleeding precautions         Weakness     Completed therapy        Prognosis - fair  Course - therapy  Plan - [place] home with [facility]   Follow up with PCP within 2  weeks  Medications called into pharmacy by office  Condition at discharge is stable  Medications, treatments, and labs reviewed  Continue medications and treatments as listed in EMR      Scribe Attestation  I, Ayse Tinoco   attest that this documentation has been prepared under the direction and in the presence of Tiburcio Dueñas MD    Provider Attestation - Scribe documentation  All medical record entries made by the Scribe were at my direction and personally dictated by me. I have reviewed the chart and agree that the record accurately reflects my personal performance of the history, physical exam, discussion and plan.   Tiburcio Dueñas MD

## 2024-10-24 NOTE — LETTER
Patient: Shashi Gutierrez  : 1945    Encounter Date: 10/24/2024    PROGRESS NOTE    Subjective  Chief complaint: Shashi Gutierrez is a 79 y.o. male who is an acute skilled patient being seen and evaluated for weakness    HPI:  HPI  Patient has been in skilled nursing facility, working in therapy following hospitalization.  Patient has made improvements with therapy.  Patient is planning to discharge home today.  Patient is seen and examined at the bedside, appears to be in no acute distress.  Patient denies nausea vomiting fever chills.  Patient reporting no further questions or concerns at this time.  Nursing staff voicing no new concerns.    Objective  Vital signs: 132/73, 97.8, 80, 18, 96%    Physical Exam  Constitutional:       General: He is not in acute distress.  Eyes:      Extraocular Movements: Extraocular movements intact.   Cardiovascular:      Rate and Rhythm: Normal rate and regular rhythm.   Pulmonary:      Effort: Pulmonary effort is normal.      Breath sounds: Normal breath sounds.   Abdominal:      General: Bowel sounds are normal.      Palpations: Abdomen is soft.   Musculoskeletal:      Cervical back: Neck supple.      Right lower leg: Edema present.      Left lower leg: Edema present.   Neurological:      Mental Status: He is alert.   Psychiatric:         Mood and Affect: Mood normal.         Behavior: Behavior is cooperative.       ADMITTING/DISCHARGE DIAGNOSES:  Assessment/Plan  Problem List Items Addressed This Visit       BPH with obstruction/lower urinary tract symptoms     Monitor  symptoms  Reports no difficulty voiding          CHF (congestive heart failure)     lisinopril   Sildenafil  Furosemide   Monitor BP  Monitor lung congestion/edema          Parkinson's disease (Multi) - Primary     Carbidopa levodopa           AF (atrial fibrillation) (Multi) (Chronic)     Rivaroxaban  Monitor heart rate  Bleeding precautions         Weakness     Completed therapy        Prognosis -  fair  Course - therapy  Plan - [place] home with [facility]   Follow up with PCP within 2 weeks  Medications called into pharmacy by office  Condition at discharge is stable  Medications, treatments, and labs reviewed  Continue medications and treatments as listed in EMR      Scribe Attestation  Misty FLORES Scribe   attest that this documentation has been prepared under the direction and in the presence of Tiburcio Dueñas MD    Provider Attestation - Scribe documentation  All medical record entries made by the Scribe were at my direction and personally dictated by me. I have reviewed the chart and agree that the record accurately reflects my personal performance of the history, physical exam, discussion and plan.   Tiburcio Dueñas MD        Electronically Signed By: Tiburcio Dueñas MD   10/25/24  1:22 PM

## 2025-03-04 ENCOUNTER — APPOINTMENT (OUTPATIENT)
Dept: CARDIOLOGY | Facility: HOSPITAL | Age: 80
End: 2025-03-04
Payer: COMMERCIAL

## 2025-03-04 ENCOUNTER — APPOINTMENT (OUTPATIENT)
Dept: RADIOLOGY | Facility: HOSPITAL | Age: 80
End: 2025-03-04
Payer: COMMERCIAL

## 2025-03-04 ENCOUNTER — HOSPITAL ENCOUNTER (OUTPATIENT)
Facility: HOSPITAL | Age: 80
Setting detail: OBSERVATION
Discharge: NURSING, REHAB OR LONG-TERM CARE | End: 2025-03-06
Attending: EMERGENCY MEDICINE | Admitting: STUDENT IN AN ORGANIZED HEALTH CARE EDUCATION/TRAINING PROGRAM
Payer: COMMERCIAL

## 2025-03-04 DIAGNOSIS — R26.2 UNABLE TO AMBULATE: Primary | ICD-10-CM

## 2025-03-04 DIAGNOSIS — R60.0 PERIPHERAL EDEMA: ICD-10-CM

## 2025-03-04 DIAGNOSIS — G20.B2 PARKINSON'S DISEASE WITH DYSKINESIA AND FLUCTUATING MANIFESTATIONS: ICD-10-CM

## 2025-03-04 PROBLEM — G20.A1 PARKINSON DISEASE, SYMPTOMATIC (MULTI): Status: ACTIVE | Noted: 2025-03-04

## 2025-03-04 LAB
ALBUMIN SERPL BCP-MCNC: 3.7 G/DL (ref 3.4–5)
ALBUMIN SERPL BCP-MCNC: 4.2 G/DL (ref 3.4–5)
ALP SERPL-CCNC: 56 U/L (ref 33–136)
ALP SERPL-CCNC: 62 U/L (ref 33–136)
ALT SERPL W P-5'-P-CCNC: 10 U/L (ref 10–52)
ALT SERPL W P-5'-P-CCNC: 18 U/L (ref 10–52)
ANION GAP BLDV CALCULATED.4IONS-SCNC: 12 MMOL/L (ref 10–25)
ANION GAP SERPL CALC-SCNC: 11 MMOL/L (ref 10–20)
ANION GAP SERPL CALC-SCNC: 13 MMOL/L (ref 10–20)
AST SERPL W P-5'-P-CCNC: 72 U/L (ref 9–39)
AST SERPL W P-5'-P-CCNC: 73 U/L (ref 9–39)
ATRIAL RATE: 62 BPM
BASE EXCESS BLDV CALC-SCNC: -8 MMOL/L (ref -2–3)
BASOPHILS # BLD AUTO: 0.02 X10*3/UL (ref 0–0.1)
BASOPHILS # BLD AUTO: 0.03 X10*3/UL (ref 0–0.1)
BASOPHILS NFR BLD AUTO: 0.3 %
BASOPHILS NFR BLD AUTO: 0.3 %
BILIRUB DIRECT SERPL-MCNC: 0.2 MG/DL (ref 0–0.3)
BILIRUB SERPL-MCNC: 1.3 MG/DL (ref 0–1.2)
BILIRUB SERPL-MCNC: 1.3 MG/DL (ref 0–1.2)
BNP SERPL-MCNC: 15 PG/ML (ref 0–99)
BODY TEMPERATURE: 37 DEGREES CELSIUS
BUN SERPL-MCNC: 19 MG/DL (ref 6–23)
BUN SERPL-MCNC: 21 MG/DL (ref 6–23)
CA-I BLDV-SCNC: 0.92 MMOL/L (ref 1.1–1.33)
CALCIUM SERPL-MCNC: 8.7 MG/DL (ref 8.6–10.3)
CALCIUM SERPL-MCNC: 9.2 MG/DL (ref 8.6–10.3)
CARDIAC TROPONIN I PNL SERPL HS: 14 NG/L (ref 0–20)
CHLORIDE BLDV-SCNC: 118 MMOL/L (ref 98–107)
CHLORIDE SERPL-SCNC: 107 MMOL/L (ref 98–107)
CHLORIDE SERPL-SCNC: 110 MMOL/L (ref 98–107)
CO2 SERPL-SCNC: 25 MMOL/L (ref 21–32)
CO2 SERPL-SCNC: 26 MMOL/L (ref 21–32)
CREAT SERPL-MCNC: 0.98 MG/DL (ref 0.5–1.3)
CREAT SERPL-MCNC: 1.21 MG/DL (ref 0.5–1.3)
EGFRCR SERPLBLD CKD-EPI 2021: 61 ML/MIN/1.73M*2
EGFRCR SERPLBLD CKD-EPI 2021: 79 ML/MIN/1.73M*2
EOSINOPHIL # BLD AUTO: 0.06 X10*3/UL (ref 0–0.4)
EOSINOPHIL # BLD AUTO: 0.07 X10*3/UL (ref 0–0.4)
EOSINOPHIL NFR BLD AUTO: 0.7 %
EOSINOPHIL NFR BLD AUTO: 1 %
ERYTHROCYTE [DISTWIDTH] IN BLOOD BY AUTOMATED COUNT: 12.4 % (ref 11.5–14.5)
ERYTHROCYTE [DISTWIDTH] IN BLOOD BY AUTOMATED COUNT: 12.5 % (ref 11.5–14.5)
FLUAV RNA RESP QL NAA+PROBE: NOT DETECTED
FLUBV RNA RESP QL NAA+PROBE: NOT DETECTED
GLUCOSE BLDV-MCNC: 56 MG/DL (ref 74–99)
GLUCOSE SERPL-MCNC: 83 MG/DL (ref 74–99)
GLUCOSE SERPL-MCNC: 83 MG/DL (ref 74–99)
HCO3 BLDV-SCNC: 17.7 MMOL/L (ref 22–26)
HCT VFR BLD AUTO: 37.3 % (ref 41–52)
HCT VFR BLD AUTO: 38.2 % (ref 41–52)
HCT VFR BLD EST: 28 % (ref 41–52)
HGB BLD-MCNC: 11.2 G/DL (ref 13.5–17.5)
HGB BLD-MCNC: 11.7 G/DL (ref 13.5–17.5)
HGB BLDV-MCNC: 9.3 G/DL (ref 13.5–17.5)
IMM GRANULOCYTES # BLD AUTO: 0.02 X10*3/UL (ref 0–0.5)
IMM GRANULOCYTES # BLD AUTO: 0.03 X10*3/UL (ref 0–0.5)
IMM GRANULOCYTES NFR BLD AUTO: 0.3 % (ref 0–0.9)
IMM GRANULOCYTES NFR BLD AUTO: 0.3 % (ref 0–0.9)
INHALED O2 CONCENTRATION: 21 %
LACTATE BLDV-SCNC: 1.1 MMOL/L (ref 0.4–2)
LYMPHOCYTES # BLD AUTO: 1.06 X10*3/UL (ref 0.8–3)
LYMPHOCYTES # BLD AUTO: 1.12 X10*3/UL (ref 0.8–3)
LYMPHOCYTES NFR BLD AUTO: 12.3 %
LYMPHOCYTES NFR BLD AUTO: 14.6 %
MAGNESIUM SERPL-MCNC: 2.14 MG/DL (ref 1.6–2.4)
MCH RBC QN AUTO: 27.1 PG (ref 26–34)
MCH RBC QN AUTO: 27.9 PG (ref 26–34)
MCHC RBC AUTO-ENTMCNC: 29.3 G/DL (ref 32–36)
MCHC RBC AUTO-ENTMCNC: 31.4 G/DL (ref 32–36)
MCV RBC AUTO: 89 FL (ref 80–100)
MCV RBC AUTO: 92 FL (ref 80–100)
MONOCYTES # BLD AUTO: 0.77 X10*3/UL (ref 0.05–0.8)
MONOCYTES # BLD AUTO: 1.06 X10*3/UL (ref 0.05–0.8)
MONOCYTES NFR BLD AUTO: 10.6 %
MONOCYTES NFR BLD AUTO: 11.6 %
NEUTROPHILS # BLD AUTO: 5.32 X10*3/UL (ref 1.6–5.5)
NEUTROPHILS # BLD AUTO: 6.8 X10*3/UL (ref 1.6–5.5)
NEUTROPHILS NFR BLD AUTO: 73.2 %
NEUTROPHILS NFR BLD AUTO: 74.8 %
NRBC BLD-RTO: 0 /100 WBCS (ref 0–0)
NRBC BLD-RTO: 0 /100 WBCS (ref 0–0)
OXYHGB MFR BLDV: 37.3 % (ref 45–75)
P AXIS: 16 DEGREES
P OFFSET: 190 MS
P ONSET: 149 MS
PCO2 BLDV: 36 MM HG (ref 41–51)
PH BLDV: 7.3 PH (ref 7.33–7.43)
PLATELET # BLD AUTO: 205 X10*3/UL (ref 150–450)
PLATELET # BLD AUTO: 216 X10*3/UL (ref 150–450)
PO2 BLDV: 31 MM HG (ref 35–45)
POTASSIUM BLDV-SCNC: 3.2 MMOL/L (ref 3.5–5.3)
POTASSIUM SERPL-SCNC: 3.8 MMOL/L (ref 3.5–5.3)
POTASSIUM SERPL-SCNC: 4.2 MMOL/L (ref 3.5–5.3)
PR INTERVAL: 128 MS
PROT SERPL-MCNC: 7.1 G/DL (ref 6.4–8.2)
PROT SERPL-MCNC: 7.5 G/DL (ref 6.4–8.2)
Q ONSET: 213 MS
QRS COUNT: 10 BEATS
QRS DURATION: 96 MS
QT INTERVAL: 420 MS
QTC CALCULATION(BAZETT): 426 MS
QTC FREDERICIA: 424 MS
R AXIS: -56 DEGREES
RBC # BLD AUTO: 4.14 X10*6/UL (ref 4.5–5.9)
RBC # BLD AUTO: 4.2 X10*6/UL (ref 4.5–5.9)
RSV RNA RESP QL NAA+PROBE: NOT DETECTED
SAO2 % BLDV: 38 % (ref 45–75)
SARS-COV-2 RNA RESP QL NAA+PROBE: NOT DETECTED
SODIUM BLDV-SCNC: 144 MMOL/L (ref 136–145)
SODIUM SERPL-SCNC: 142 MMOL/L (ref 136–145)
SODIUM SERPL-SCNC: 142 MMOL/L (ref 136–145)
T AXIS: -36 DEGREES
T OFFSET: 423 MS
TSH SERPL-ACNC: 1.37 MIU/L (ref 0.44–3.98)
VENTRICULAR RATE: 62 BPM
WBC # BLD AUTO: 7.3 X10*3/UL (ref 4.4–11.3)
WBC # BLD AUTO: 9.1 X10*3/UL (ref 4.4–11.3)

## 2025-03-04 PROCEDURE — G0378 HOSPITAL OBSERVATION PER HR: HCPCS

## 2025-03-04 PROCEDURE — 85025 COMPLETE CBC W/AUTO DIFF WBC: CPT | Performed by: STUDENT IN AN ORGANIZED HEALTH CARE EDUCATION/TRAINING PROGRAM

## 2025-03-04 PROCEDURE — 84443 ASSAY THYROID STIM HORMONE: CPT | Performed by: EMERGENCY MEDICINE

## 2025-03-04 PROCEDURE — 84484 ASSAY OF TROPONIN QUANT: CPT | Performed by: EMERGENCY MEDICINE

## 2025-03-04 PROCEDURE — 83735 ASSAY OF MAGNESIUM: CPT | Performed by: STUDENT IN AN ORGANIZED HEALTH CARE EDUCATION/TRAINING PROGRAM

## 2025-03-04 PROCEDURE — 83880 ASSAY OF NATRIURETIC PEPTIDE: CPT | Performed by: EMERGENCY MEDICINE

## 2025-03-04 PROCEDURE — 36415 COLL VENOUS BLD VENIPUNCTURE: CPT | Performed by: STUDENT IN AN ORGANIZED HEALTH CARE EDUCATION/TRAINING PROGRAM

## 2025-03-04 PROCEDURE — 99285 EMERGENCY DEPT VISIT HI MDM: CPT | Performed by: EMERGENCY MEDICINE

## 2025-03-04 PROCEDURE — 84132 ASSAY OF SERUM POTASSIUM: CPT | Performed by: EMERGENCY MEDICINE

## 2025-03-04 PROCEDURE — 85025 COMPLETE CBC W/AUTO DIFF WBC: CPT | Performed by: EMERGENCY MEDICINE

## 2025-03-04 PROCEDURE — 87637 SARSCOV2&INF A&B&RSV AMP PRB: CPT | Performed by: STUDENT IN AN ORGANIZED HEALTH CARE EDUCATION/TRAINING PROGRAM

## 2025-03-04 PROCEDURE — 87634 RSV DNA/RNA AMP PROBE: CPT | Performed by: STUDENT IN AN ORGANIZED HEALTH CARE EDUCATION/TRAINING PROGRAM

## 2025-03-04 PROCEDURE — 71045 X-RAY EXAM CHEST 1 VIEW: CPT

## 2025-03-04 PROCEDURE — 82248 BILIRUBIN DIRECT: CPT | Performed by: EMERGENCY MEDICINE

## 2025-03-04 PROCEDURE — 99222 1ST HOSP IP/OBS MODERATE 55: CPT | Performed by: STUDENT IN AN ORGANIZED HEALTH CARE EDUCATION/TRAINING PROGRAM

## 2025-03-04 PROCEDURE — 2500000001 HC RX 250 WO HCPCS SELF ADMINISTERED DRUGS (ALT 637 FOR MEDICARE OP): Performed by: STUDENT IN AN ORGANIZED HEALTH CARE EDUCATION/TRAINING PROGRAM

## 2025-03-04 PROCEDURE — 93005 ELECTROCARDIOGRAM TRACING: CPT

## 2025-03-04 PROCEDURE — 71045 X-RAY EXAM CHEST 1 VIEW: CPT | Performed by: RADIOLOGY

## 2025-03-04 PROCEDURE — 84075 ASSAY ALKALINE PHOSPHATASE: CPT | Performed by: STUDENT IN AN ORGANIZED HEALTH CARE EDUCATION/TRAINING PROGRAM

## 2025-03-04 PROCEDURE — 36415 COLL VENOUS BLD VENIPUNCTURE: CPT | Performed by: EMERGENCY MEDICINE

## 2025-03-04 PROCEDURE — 2500000002 HC RX 250 W HCPCS SELF ADMINISTERED DRUGS (ALT 637 FOR MEDICARE OP, ALT 636 FOR OP/ED): Performed by: STUDENT IN AN ORGANIZED HEALTH CARE EDUCATION/TRAINING PROGRAM

## 2025-03-04 RX ORDER — ALBUTEROL SULFATE 0.83 MG/ML
2.5 SOLUTION RESPIRATORY (INHALATION) EVERY 4 HOURS PRN
Status: DISCONTINUED | OUTPATIENT
Start: 2025-03-04 | End: 2025-03-04

## 2025-03-04 RX ORDER — FLUTICASONE PROPIONATE 50 MCG
2 SPRAY, SUSPENSION (ML) NASAL DAILY PRN
Status: DISCONTINUED | OUTPATIENT
Start: 2025-03-04 | End: 2025-03-06 | Stop reason: HOSPADM

## 2025-03-04 RX ORDER — ALBUTEROL SULFATE 0.83 MG/ML
2.5 SOLUTION RESPIRATORY (INHALATION) EVERY 2 HOUR PRN
Status: DISCONTINUED | OUTPATIENT
Start: 2025-03-04 | End: 2025-03-06 | Stop reason: HOSPADM

## 2025-03-04 RX ORDER — ATORVASTATIN CALCIUM 20 MG/1
20 TABLET, FILM COATED ORAL NIGHTLY
Status: DISCONTINUED | OUTPATIENT
Start: 2025-03-04 | End: 2025-03-06 | Stop reason: HOSPADM

## 2025-03-04 RX ORDER — LISINOPRIL 5 MG/1
5 TABLET ORAL DAILY
Status: DISCONTINUED | OUTPATIENT
Start: 2025-03-04 | End: 2025-03-06 | Stop reason: HOSPADM

## 2025-03-04 RX ORDER — ALBUTEROL SULFATE 90 UG/1
2 INHALANT RESPIRATORY (INHALATION) EVERY 6 HOURS PRN
Status: DISCONTINUED | OUTPATIENT
Start: 2025-03-04 | End: 2025-03-04

## 2025-03-04 RX ORDER — DOCUSATE SODIUM 100 MG/1
100 CAPSULE, LIQUID FILLED ORAL DAILY
Status: DISCONTINUED | OUTPATIENT
Start: 2025-03-04 | End: 2025-03-06 | Stop reason: HOSPADM

## 2025-03-04 RX ORDER — FUROSEMIDE 20 MG/1
20 TABLET ORAL DAILY
Status: DISCONTINUED | OUTPATIENT
Start: 2025-03-04 | End: 2025-03-06 | Stop reason: HOSPADM

## 2025-03-04 RX ORDER — CARBIDOPA AND LEVODOPA 25; 100 MG/1; MG/1
1 TABLET ORAL 3 TIMES DAILY
Status: DISCONTINUED | OUTPATIENT
Start: 2025-03-04 | End: 2025-03-06 | Stop reason: HOSPADM

## 2025-03-04 RX ORDER — CETIRIZINE HYDROCHLORIDE 10 MG/1
10 TABLET ORAL DAILY
Status: DISCONTINUED | OUTPATIENT
Start: 2025-03-04 | End: 2025-03-06 | Stop reason: HOSPADM

## 2025-03-04 RX ADMIN — RIVAROXABAN 20 MG: 20 TABLET, FILM COATED ORAL at 20:55

## 2025-03-04 RX ADMIN — LISINOPRIL 5 MG: 5 TABLET ORAL at 20:55

## 2025-03-04 RX ADMIN — FUROSEMIDE 20 MG: 20 TABLET ORAL at 20:55

## 2025-03-04 RX ADMIN — CARBIDOPA AND LEVODOPA 1 TABLET: 25; 100 TABLET ORAL at 20:55

## 2025-03-04 RX ADMIN — DOCUSATE SODIUM 100 MG: 100 CAPSULE, LIQUID FILLED ORAL at 20:55

## 2025-03-04 RX ADMIN — CETIRIZINE HYDROCHLORIDE 10 MG: 10 TABLET, FILM COATED ORAL at 20:55

## 2025-03-04 RX ADMIN — ATORVASTATIN CALCIUM 20 MG: 20 TABLET, FILM COATED ORAL at 20:55

## 2025-03-04 SDOH — SOCIAL STABILITY: SOCIAL NETWORK: HOW OFTEN DO YOU GET TOGETHER WITH FRIENDS OR RELATIVES?: PATIENT DECLINED

## 2025-03-04 SDOH — SOCIAL STABILITY: SOCIAL NETWORK
DO YOU BELONG TO ANY CLUBS OR ORGANIZATIONS SUCH AS CHURCH GROUPS, UNIONS, FRATERNAL OR ATHLETIC GROUPS, OR SCHOOL GROUPS?: PATIENT DECLINED

## 2025-03-04 SDOH — SOCIAL STABILITY: SOCIAL INSECURITY: DO YOU FEEL ANYONE HAS EXPLOITED OR TAKEN ADVANTAGE OF YOU FINANCIALLY OR OF YOUR PERSONAL PROPERTY?: NO

## 2025-03-04 SDOH — ECONOMIC STABILITY: FOOD INSECURITY: HOW HARD IS IT FOR YOU TO PAY FOR THE VERY BASICS LIKE FOOD, HOUSING, MEDICAL CARE, AND HEATING?: NOT HARD AT ALL

## 2025-03-04 SDOH — ECONOMIC STABILITY: HOUSING INSECURITY: AT ANY TIME IN THE PAST 12 MONTHS, WERE YOU HOMELESS OR LIVING IN A SHELTER (INCLUDING NOW)?: NO

## 2025-03-04 SDOH — ECONOMIC STABILITY: FOOD INSECURITY: WITHIN THE PAST 12 MONTHS, YOU WORRIED THAT YOUR FOOD WOULD RUN OUT BEFORE YOU GOT THE MONEY TO BUY MORE.: NEVER TRUE

## 2025-03-04 SDOH — SOCIAL STABILITY: SOCIAL INSECURITY: HAS ANYONE EVER THREATENED TO HURT YOUR FAMILY OR YOUR PETS?: NO

## 2025-03-04 SDOH — SOCIAL STABILITY: SOCIAL INSECURITY: HAVE YOU HAD ANY THOUGHTS OF HARMING ANYONE ELSE?: NO

## 2025-03-04 SDOH — ECONOMIC STABILITY: HOUSING INSECURITY: IN THE LAST 12 MONTHS, WAS THERE A TIME WHEN YOU WERE NOT ABLE TO PAY THE MORTGAGE OR RENT ON TIME?: NO

## 2025-03-04 SDOH — HEALTH STABILITY: PHYSICAL HEALTH: ON AVERAGE, HOW MANY DAYS PER WEEK DO YOU ENGAGE IN MODERATE TO STRENUOUS EXERCISE (LIKE A BRISK WALK)?: 0 DAYS

## 2025-03-04 SDOH — SOCIAL STABILITY: SOCIAL INSECURITY
WITHIN THE LAST YEAR, HAVE YOU BEEN KICKED, HIT, SLAPPED, OR OTHERWISE PHYSICALLY HURT BY YOUR PARTNER OR EX-PARTNER?: NO

## 2025-03-04 SDOH — SOCIAL STABILITY: SOCIAL INSECURITY: DOES ANYONE TRY TO KEEP YOU FROM HAVING/CONTACTING OTHER FRIENDS OR DOING THINGS OUTSIDE YOUR HOME?: NO

## 2025-03-04 SDOH — ECONOMIC STABILITY: INCOME INSECURITY: IN THE PAST 12 MONTHS HAS THE ELECTRIC, GAS, OIL, OR WATER COMPANY THREATENED TO SHUT OFF SERVICES IN YOUR HOME?: NO

## 2025-03-04 SDOH — SOCIAL STABILITY: SOCIAL INSECURITY
WITHIN THE LAST YEAR, HAVE YOU BEEN RAPED OR FORCED TO HAVE ANY KIND OF SEXUAL ACTIVITY BY YOUR PARTNER OR EX-PARTNER?: NO

## 2025-03-04 SDOH — HEALTH STABILITY: PHYSICAL HEALTH
HOW OFTEN DO YOU NEED TO HAVE SOMEONE HELP YOU WHEN YOU READ INSTRUCTIONS, PAMPHLETS, OR OTHER WRITTEN MATERIAL FROM YOUR DOCTOR OR PHARMACY?: NEVER

## 2025-03-04 SDOH — HEALTH STABILITY: MENTAL HEALTH
DO YOU FEEL STRESS - TENSE, RESTLESS, NERVOUS, OR ANXIOUS, OR UNABLE TO SLEEP AT NIGHT BECAUSE YOUR MIND IS TROUBLED ALL THE TIME - THESE DAYS?: NOT AT ALL

## 2025-03-04 SDOH — SOCIAL STABILITY: SOCIAL INSECURITY: WITHIN THE LAST YEAR, HAVE YOU BEEN HUMILIATED OR EMOTIONALLY ABUSED IN OTHER WAYS BY YOUR PARTNER OR EX-PARTNER?: NO

## 2025-03-04 SDOH — SOCIAL STABILITY: SOCIAL NETWORK: HOW OFTEN DO YOU ATTEND CHURCH OR RELIGIOUS SERVICES?: PATIENT DECLINED

## 2025-03-04 SDOH — SOCIAL STABILITY: SOCIAL INSECURITY: HAVE YOU HAD THOUGHTS OF HARMING ANYONE ELSE?: NO

## 2025-03-04 SDOH — SOCIAL STABILITY: SOCIAL INSECURITY: ABUSE: ADULT

## 2025-03-04 SDOH — ECONOMIC STABILITY: FOOD INSECURITY: WITHIN THE PAST 12 MONTHS, THE FOOD YOU BOUGHT JUST DIDN'T LAST AND YOU DIDN'T HAVE MONEY TO GET MORE.: NEVER TRUE

## 2025-03-04 SDOH — SOCIAL STABILITY: SOCIAL NETWORK: HOW OFTEN DO YOU ATTEND MEETINGS OF THE CLUBS OR ORGANIZATIONS YOU BELONG TO?: PATIENT DECLINED

## 2025-03-04 SDOH — SOCIAL STABILITY: SOCIAL INSECURITY: WERE YOU ABLE TO COMPLETE ALL THE BEHAVIORAL HEALTH SCREENINGS?: YES

## 2025-03-04 SDOH — SOCIAL STABILITY: SOCIAL INSECURITY: ARE YOU MARRIED, WIDOWED, DIVORCED, SEPARATED, NEVER MARRIED, OR LIVING WITH A PARTNER?: PATIENT DECLINED

## 2025-03-04 SDOH — ECONOMIC STABILITY: TRANSPORTATION INSECURITY: IN THE PAST 12 MONTHS, HAS LACK OF TRANSPORTATION KEPT YOU FROM MEDICAL APPOINTMENTS OR FROM GETTING MEDICATIONS?: NO

## 2025-03-04 SDOH — SOCIAL STABILITY: SOCIAL NETWORK: IN A TYPICAL WEEK, HOW MANY TIMES DO YOU TALK ON THE PHONE WITH FAMILY, FRIENDS, OR NEIGHBORS?: TWICE A WEEK

## 2025-03-04 SDOH — SOCIAL STABILITY: SOCIAL INSECURITY: DO YOU FEEL UNSAFE GOING BACK TO THE PLACE WHERE YOU ARE LIVING?: NO

## 2025-03-04 SDOH — SOCIAL STABILITY: SOCIAL INSECURITY: ARE THERE ANY APPARENT SIGNS OF INJURIES/BEHAVIORS THAT COULD BE RELATED TO ABUSE/NEGLECT?: NO

## 2025-03-04 SDOH — SOCIAL STABILITY: SOCIAL INSECURITY: WITHIN THE LAST YEAR, HAVE YOU BEEN AFRAID OF YOUR PARTNER OR EX-PARTNER?: NO

## 2025-03-04 SDOH — ECONOMIC STABILITY: HOUSING INSECURITY: IN THE PAST 12 MONTHS, HOW MANY TIMES HAVE YOU MOVED WHERE YOU WERE LIVING?: 0

## 2025-03-04 SDOH — SOCIAL STABILITY: SOCIAL INSECURITY: ARE YOU OR HAVE YOU BEEN THREATENED OR ABUSED PHYSICALLY, EMOTIONALLY, OR SEXUALLY BY ANYONE?: NO

## 2025-03-04 SDOH — HEALTH STABILITY: PHYSICAL HEALTH: ON AVERAGE, HOW MANY MINUTES DO YOU ENGAGE IN EXERCISE AT THIS LEVEL?: PATIENT DECLINED

## 2025-03-04 ASSESSMENT — LIFESTYLE VARIABLES
AUDIT-C TOTAL SCORE: 0
HOW MANY STANDARD DRINKS CONTAINING ALCOHOL DO YOU HAVE ON A TYPICAL DAY: PATIENT DOES NOT DRINK
HOW OFTEN DO YOU HAVE A DRINK CONTAINING ALCOHOL: NEVER
HOW OFTEN DO YOU HAVE 6 OR MORE DRINKS ON ONE OCCASION: NEVER
SKIP TO QUESTIONS 9-10: 1
AUDIT-C TOTAL SCORE: 0

## 2025-03-04 ASSESSMENT — COGNITIVE AND FUNCTIONAL STATUS - GENERAL
DRESSING REGULAR UPPER BODY CLOTHING: A LOT
TURNING FROM BACK TO SIDE WHILE IN FLAT BAD: A LITTLE
HELP NEEDED FOR BATHING: A LITTLE
WALKING IN HOSPITAL ROOM: A LOT
PERSONAL GROOMING: A LITTLE
DAILY ACTIVITIY SCORE: 17
DRESSING REGULAR LOWER BODY CLOTHING: A LITTLE
CLIMB 3 TO 5 STEPS WITH RAILING: A LOT
TOILETING: A LOT
MOBILITY SCORE: 14
PATIENT BASELINE BEDBOUND: NO
MOVING FROM LYING ON BACK TO SITTING ON SIDE OF FLAT BED WITH BEDRAILS: A LITTLE
STANDING UP FROM CHAIR USING ARMS: A LOT
MOVING TO AND FROM BED TO CHAIR: A LOT

## 2025-03-04 ASSESSMENT — PAIN - FUNCTIONAL ASSESSMENT
PAIN_FUNCTIONAL_ASSESSMENT: 0-10

## 2025-03-04 ASSESSMENT — PATIENT HEALTH QUESTIONNAIRE - PHQ9
1. LITTLE INTEREST OR PLEASURE IN DOING THINGS: NOT AT ALL
SUM OF ALL RESPONSES TO PHQ9 QUESTIONS 1 & 2: 0
2. FEELING DOWN, DEPRESSED OR HOPELESS: NOT AT ALL

## 2025-03-04 ASSESSMENT — ACTIVITIES OF DAILY LIVING (ADL)
GROOMING: NEEDS ASSISTANCE
WALKS IN HOME: NEEDS ASSISTANCE
HEARING - RIGHT EAR: FUNCTIONAL
BATHING: NEEDS ASSISTANCE
HEARING - LEFT EAR: FUNCTIONAL
LACK_OF_TRANSPORTATION: NO
FEEDING YOURSELF: INDEPENDENT
LACK_OF_TRANSPORTATION: NO
DRESSING YOURSELF: NEEDS ASSISTANCE
PATIENT'S MEMORY ADEQUATE TO SAFELY COMPLETE DAILY ACTIVITIES?: YES
ADEQUATE_TO_COMPLETE_ADL: YES
TOILETING: NEEDS ASSISTANCE
JUDGMENT_ADEQUATE_SAFELY_COMPLETE_DAILY_ACTIVITIES: YES

## 2025-03-04 ASSESSMENT — PAIN SCALES - GENERAL
PAINLEVEL_OUTOF10: 0 - NO PAIN

## 2025-03-04 ASSESSMENT — COLUMBIA-SUICIDE SEVERITY RATING SCALE - C-SSRS
2. HAVE YOU ACTUALLY HAD ANY THOUGHTS OF KILLING YOURSELF?: NO
1. IN THE PAST MONTH, HAVE YOU WISHED YOU WERE DEAD OR WISHED YOU COULD GO TO SLEEP AND NOT WAKE UP?: NO
6. HAVE YOU EVER DONE ANYTHING, STARTED TO DO ANYTHING, OR PREPARED TO DO ANYTHING TO END YOUR LIFE?: NO

## 2025-03-04 NOTE — PROGRESS NOTES
03/04/25 1109   [facility] Disability Status   Are you deaf or do you have serious difficulty hearing? N   Are you blind or do you have serious difficulty seeing, even when wearing glasses? N   Because of a physical, mental, or emotional condition, do you have serious difficulty concentrating, remembering, or making decisions? (5 years old or older) Y   Do you have serious difficulty walking or climbing stairs? Y   Do you have serious difficulty dressing or bathing? Y   Because of a physical, mental, or emotional condition, do you have serious difficulty doing errands alone such as visiting the doctor? Y

## 2025-03-04 NOTE — ED TRIAGE NOTES
"Pt BIBA from home with c/o stiffness. Pt has a hx of parkinsons and states that approximately 2 hours prior to arrival, pt became stiff. Pt states \"this happens sometimes and they normally give me meds to fix it\". Pt denies pain. BLE +2 pitting edema present on initial exam. Pt a&ox3, disoriented to time.  "

## 2025-03-04 NOTE — ED PROVIDER NOTES
HPI   Chief Complaint   Patient presents with    stiffness       HPI: []  79-year-old Afro-American male with history of hypertension, CKD, advanced Parkinson disease, CAD, BPH, A-fib on Xarelto comes in with generalized deconditioning.  Son states that he lives at home with his brother in the last few weeks he has been declining unable to ambulate increasing leg swelling and they feel that his Parkinson getting worse.  He getting more stiff.  Patient has no complaints.  He denies any chest pain pressure heaviness fever chills nausea vomit diarrhea cough congestion incontinence seizures syncope or near syncope, no hematemesis hematochezia hemoptysis no urine frequency urgency materia no trauma no falls.    Past history: Hypertension, CKD, Parkinson disease, CAD, BPH, A-fib  Social: No history of reported tobacco alcohol or drug abuse.    REVIEW OF SYSTEMS:    GENERAL.: No weight loss, positive for fatigue, anorexia, insomnia, fever.    EYES: No vision loss, double vision, drainage, eye pain.    ENT: No pharyngitis, dry mouth.    CARDIOPULMONARY: No chest pain, palpitations, syncope, near syncope. No shortness of breath, cough, hemoptysis.    GI: No abdominal pain, change in bowel habits, melena, hematemesis, hematochezia, nausea, vomiting, diarrhea.    : No discharge, dysuria, frequency, urgency, hematuria.    MS: No limb pain, joint pain, joint swelling.  Positive for lower simile swelling    SKIN: No rashes.    PSYCH: No depression, anxiety, suicidality, homicidality.    Review of systems is otherwise negative unless stated above or in history of present illness.  Social history, family history, allergies reviewed.  PHYSICAL EXAM:    GENERAL: Vitals noted, no distress. Alert and oriented  x 3. Non-toxic.  Disheveled    EENT: TMs clear. Posterior oropharynx unremarkable. No meningismus. No LAD.     NECK: Supple. Nontender. No midline tenderness.     CARDIAC: Regular, rate, rhythm. No murmurs rubs or gallops. No  JVD    PULMONARY: Lungs clear bilaterally with good aeration. No wheezes rales or rhonchi. No respiratory distress.  Fine bibasilar crackles    ABDOMEN: Soft, nonsurgical. Nontender. No peritoneal signs. Normoactive bowel sounds. No pulsatile masses.     EXTREMITIES: 3+ bilateral symmetric pitting peripheral edema. Negative Homans bilaterally, no cords.  2+ bounding pulses well-perfused.    SKIN: No rash. Intact.  Advanced onychomycosis of his toenails    NEURO: No focal neurologic deficits, NIH score of 0. Cranial nerves normal as tested from II through XII.  Advanced Parkinson disease which makes exam limited.  And difficult.    MEDICAL DECISION MAKING:  EKG on my interpretation shows a normal sinus rhythm left axis deviation rate in the mid 80s with no acute ischemic change.  CBC with differential chemistry LFTs TSH and BNP are normal chest x-ray shows mild congestion    Treatment: IV established patient a cardiac monitor.  ED course: Patient remained stable hemodynamic.  Impression: #1 Parkinson disease progression, #2 peripheral edema, #3 onychomycosis of the toes, #4 unable to care for self  Plan/MDM: 79-year-old male history of advanced Parkinson disease A-fib on Xarelto comes in with generalized deconditioning perhaps worsening Parkinson disease and increasing pedal edema, this could be worsening of his underlying disease my suspicion for infection dehydration sepsis volume overload congestive heart failure STEMI NSTEMI is low.  Family is unable to care for him patient will be hospitalized for further evaluation and for possible placement if indicated.              Patient History   Past Medical History:   Diagnosis Date    Atrial fibrillation (Multi)     BPH (benign prostatic hyperplasia)     CAD (coronary artery disease)     CKD (chronic kidney disease)     Diuretic-induced hypokalemia 10/10/2023    ED (erectile dysfunction) 02/12/2023    Encounter for general adult medical examination without abnormal  findings 2019    Encounter for annual health examination    Encounter for general adult medical examination without abnormal findings 2021    Encounter for annual health examination    Encounter for screening for malignant neoplasm of prostate     Encounter for prostate cancer screening    Hypertension     Internal hemorrhoid 2012    Kidney lesion 2023    Occasional tremors 2023    Onychomycosis 2023     Past Surgical History:   Procedure Laterality Date    CT ANGIO CORONARY ART WITH HEARTFLOW IF SCORE >30%  2018    CT HEART CORONARY ANGIOGRAM 2018 [facility]     Family History   Problem Relation Name Age of Onset    No Known Problems Other       Social History     Tobacco Use    Smoking status: Former     Types: Cigars     Quit date:      Years since quittin.2    Smokeless tobacco: Never   Substance Use Topics    Alcohol use: Not Currently    Drug use: Never       Physical Exam   ED Triage Vitals [25 0604]   Temperature Heart Rate Respirations BP   36.8 °C (98.2 °F) 78 12 128/70      Pulse Ox Temp Source Heart Rate Source Patient Position   94 % Temporal Monitor Sitting      BP Location FiO2 (%)     Right arm --       Physical Exam      ED Course & [facility]   ED Course as of 25 1619   e Mar 04, 2025   1618 Patient is EKG shows sinus rhythm with no ischemic changes.  Troponin is normal.  Labs reassuring.  X-ray of the chest unremarkable, patient is clearly deconditioned, family unable to care for him will be hospitalized for further care.  May need placement. [MT]      ED Course User Index  [MT] Soila Mcfarland MD         Diagnoses as of 25 1619   Unable to ambulate   Parkinson's disease with dyskinesia and fluctuating manifestations   Peripheral edema                 No data recorded                                 Medical Decision Making      Procedure  Procedures     Soila Mcfarland MD  25 1624

## 2025-03-04 NOTE — H&P
History Of Present Illness  Shashi Gutierrez is a 79 y.o. male PMH of atrial fibrillation (on xarelto), HFpEF, HLD, prostate ca, Parkinson's disease, CKD 3a, mild intermittent asthma, presenting with weakness. Patient is a poor historian. He is tired appeared and oriented 2x. Spoke with son on the phone who is the POA. He has been weak this past week. Son states usually becomes weak and rigid when he doesn't take his medications. He had missed 2 doses that week      Past Medical History  He has a past medical history of Atrial fibrillation (Multi), BPH (benign prostatic hyperplasia), CAD (coronary artery disease), CKD (chronic kidney disease), Diuretic-induced hypokalemia (10/10/2023), ED (erectile dysfunction) (02/12/2023), Encounter for general adult medical examination without abnormal findings (03/04/2019), Encounter for general adult medical examination without abnormal findings (01/03/2021), Encounter for screening for malignant neoplasm of prostate, Hypertension, Internal hemorrhoid (09/14/2012), Kidney lesion (02/12/2023), Occasional tremors (02/12/2023), and Onychomycosis (05/05/2023).    Surgical History  He has a past surgical history that includes CT angio coronary art with heartflow if score >30% (8/27/2018).     Social History  He reports that he quit smoking about 47 years ago. His smoking use included cigars. He has never used smokeless tobacco. He reports that he does not currently use alcohol. He reports that he does not use drugs.    Family History  Family History   Problem Relation Name Age of Onset    No Known Problems Other          Allergies  Aspirin, Carvedilol, and Metoprolol    Review of Systems   Constitutional:  Negative for activity change and appetite change.   Neurological:  Positive for tremors.        Physical Exam  Tired appearing and generally rigid    Cardiovascular:      Rate and Rhythm: Normal rate and regular rhythm.   Pulmonary:      Effort: Pulmonary effort is normal.       Breath sounds: Normal breath sounds.   Skin:     General: Skin is warm.   Neurological:    Mental Status: He is alert and oriented to person, placeMental status is at baseline.   Last Recorded Vitals  /69   Pulse 62   Temp 36.8 °C (98.2 °F) (Temporal)   Resp 12   Wt 90.3 kg (199 lb)   SpO2 96%     Relevant Results             Assessment/Plan   Assessment & Plan  Parkinson disease, symptomatic (Multi)      Parkinson's disease   Worsening symptoms 2/2 to missed doses     - continue carbidopa-levodopa     Hx of Atrial fibrillation   - continue telemetry monitoring   - continue xarelto     HFpEF   - evidence of mild volume overload on admission, improved   - continue lasix and spironolactone      HLD   - continue statin      Prostate cancer  - nil acute. Monitor for urinary retention        CKD 3a  - creatinine consistent with baseline   - avoid nephrotoxic agents      Mild intermittent asthma   - not in exacerbation   - continue albuterol PRN      VTE/GI Prophylaxis   - therapeutic AC on xarelto   - bowel regimen in place        Alvina Spear MD

## 2025-03-04 NOTE — PROGRESS NOTES
Transitional Care Coordination Progress Note:  Plan per Medical/Surgical team: treatment of weakness with PT/OT evals pending  Status: Observation  Payor source: [facility] viviana jeronimo  Discharge disposition: Home with son,   @ 1100 Spoke with son. Anticipate PT rec MOD. Discussed purpose & benefits of SNF. Patient's son provided choice list for new snf. [facility]  ADOD: 3/5/2025   REGINALD Carvajal RN, BSN Transitional Care Coordinator ED# [phone]          03/04/25 1106   Discharge Planning   Living Arrangements Children   Support Systems Children   Assistance Needed PT/OT evals, insurance precert for new SNF: [facility], referral started, provided private aide numbers   Type of Residence Private residence   Number of Stairs to Enter Residence 6   Number of Stairs Within Residence 6   Home or Post Acute Services Post acute facilities (Rehab/SNF/etc)   Type of Post Acute Facility Services Skilled nursing   Expected Discharge Disposition SNF   Does the patient need discharge transport arranged? Yes   RoundTrip coordination needed? Yes   Has discharge transport been arranged? No   Financial Resource Strain   How hard is it for you to pay for the very basics like food, housing, medical care, and heating? Not hard   Housing Stability   In the last 12 months, was there a time when you were not able to pay the mortgage or rent on time? N   In the past 12 months, how many times have you moved where you were living? 1   At any time in the past 12 months, were you homeless or living in a shelter (including now)? N   Transportation Needs   In the past 12 months, has lack of transportation kept you from medical appointments or from getting medications? no   In the past 12 months, has lack of transportation kept you from meetings, work, or from getting things needed for daily living? No   Stroke Family Assessment   Stroke Family Assessment Needed No   Intensity of Service   Intensity of  Service 0-30 min

## 2025-03-04 NOTE — PROGRESS NOTES
Pharmacy Medication History     Source of Information: PATIENT'S SON AND PHARMACY    Additional concerns with the patient's PTA list.     The following updates were made to the Prior to Admission medication list:     Medications ADDED:   N/A  Medications CHANGED:  N/A  Medications REMOVED:   SILDENAFIL 50 MG  SENNOSIDES 8.6 MG  Medications NOT TAKING:   N/A    Allergy reviewed : Yes    Meds 2 Beds : Yes    Outpatient pharmacy confirmed and updated in chart : Yes    Pharm[facility] name: [facility]    The list below reflectives the updated PTA list. Please review each medication in order reconciliation for additional clarification and justification.    Prior to Admission Medications   Prescriptions Last Dose Informant     acetaminophen (Tylenol) 325 mg tablet Past Week Child     Sig: Take 1 tablet (325 mg) by mouth every 4 hours if needed for fever (temp greater than 38.0 C) or headaches.   albuterol (ProAir HFA) 90 mcg/actuation inhaler Past Month Child     Sig: Inhale 2 puffs every 6 hours if needed for shortness of breath or wheezing.   atorvastatin (Lipitor) 20 mg tablet Past Week Child     Sig: Take 1 tablet (20 mg) by mouth once daily.   carbidopa-levodopa (Sinemet)  mg tablet Past Week Child     Sig: Take 1 tablet by mouth 3 times a day.   diclofenac sodium (Voltaren) 1 % gel  Child     Sig: Apply 4.5 inches (4 g) topically 4 times a day.   docusate sodium (Colace) 100 mg capsule Past Week Child     Sig: Take 1 capsule (100 mg) by mouth once daily.   fexofenadine (Allegra) 180 mg tablet  Child     Sig: Take 1 tablet (180 mg) by mouth once daily.   fluticasone (Flonase) 50 mcg/actuation nasal spray  Child     Sig: Administer 2 sprays into each nostril once daily as needed for rhinitis. Shake liquid before use   folic acid/multivit-min/lutein (CENTRUM SILVER ORAL) Past Week Child     Sig: Take 1 tablet by mouth once daily.   furosemide (Lasix) 40 mg tablet Past Week Child     Sig: Take 0.5 tablets (20 mg) by mouth  once daily.   lisinopril 5 mg tablet Past Week Child     Sig: Take 1 tablet (5 mg) by mouth once daily.   rivaroxaban (Xarelto) 20 mg tablet Past Week Child     Sig: Take 1 tablet (20 mg) by mouth once daily.                                Facility-Administered Medications: None       The list below reflectives the updated allergy list. Please review each documented allergy for additional clarification and justification.    Allergies   Allergen Reactions    Aspirin Other     Ok with 81m g dose  Reaction: 325 mg dose  has severe rectal bleeding    Carvedilol Unknown     Patient states not sure     Metoprolol Other     Sexual dysfunction          03/04/25 at 12:20 PM - Jennifer Zimmer

## 2025-03-05 LAB
APPEARANCE UR: CLEAR
BILIRUB UR STRIP.AUTO-MCNC: NEGATIVE MG/DL
COLOR UR: YELLOW
GLUCOSE UR STRIP.AUTO-MCNC: NORMAL MG/DL
KETONES UR STRIP.AUTO-MCNC: NEGATIVE MG/DL
LEUKOCYTE ESTERASE UR QL STRIP.AUTO: NEGATIVE
MUCOUS THREADS #/AREA URNS AUTO: NORMAL /LPF
NITRITE UR QL STRIP.AUTO: NEGATIVE
PH UR STRIP.AUTO: 5.5 [PH]
PROT UR STRIP.AUTO-MCNC: NORMAL MG/DL
RBC # UR STRIP.AUTO: NEGATIVE MG/DL
RBC #/AREA URNS AUTO: NORMAL /HPF
SP GR UR STRIP.AUTO: 1.02
SQUAMOUS #/AREA URNS AUTO: NORMAL /HPF
UROBILINOGEN UR STRIP.AUTO-MCNC: NORMAL MG/DL
WBC #/AREA URNS AUTO: NORMAL /HPF

## 2025-03-05 PROCEDURE — 2500000002 HC RX 250 W HCPCS SELF ADMINISTERED DRUGS (ALT 637 FOR MEDICARE OP, ALT 636 FOR OP/ED): Performed by: STUDENT IN AN ORGANIZED HEALTH CARE EDUCATION/TRAINING PROGRAM

## 2025-03-05 PROCEDURE — 97530 THERAPEUTIC ACTIVITIES: CPT | Mod: GP

## 2025-03-05 PROCEDURE — G0378 HOSPITAL OBSERVATION PER HR: HCPCS

## 2025-03-05 PROCEDURE — 2500000001 HC RX 250 WO HCPCS SELF ADMINISTERED DRUGS (ALT 637 FOR MEDICARE OP): Performed by: STUDENT IN AN ORGANIZED HEALTH CARE EDUCATION/TRAINING PROGRAM

## 2025-03-05 PROCEDURE — 81001 URINALYSIS AUTO W/SCOPE: CPT | Performed by: EMERGENCY MEDICINE

## 2025-03-05 PROCEDURE — 97161 PT EVAL LOW COMPLEX 20 MIN: CPT | Mod: GP

## 2025-03-05 PROCEDURE — 99232 SBSQ HOSP IP/OBS MODERATE 35: CPT | Performed by: STUDENT IN AN ORGANIZED HEALTH CARE EDUCATION/TRAINING PROGRAM

## 2025-03-05 PROCEDURE — 97535 SELF CARE MNGMENT TRAINING: CPT | Mod: GO

## 2025-03-05 PROCEDURE — 97165 OT EVAL LOW COMPLEX 30 MIN: CPT | Mod: GO

## 2025-03-05 RX ORDER — ACETAMINOPHEN 325 MG/1
650 TABLET ORAL EVERY 6 HOURS PRN
Status: DISCONTINUED | OUTPATIENT
Start: 2025-03-05 | End: 2025-03-06 | Stop reason: HOSPADM

## 2025-03-05 RX ADMIN — CARBIDOPA AND LEVODOPA 1 TABLET: 25; 100 TABLET ORAL at 14:52

## 2025-03-05 RX ADMIN — RIVAROXABAN 20 MG: 20 TABLET, FILM COATED ORAL at 17:12

## 2025-03-05 RX ADMIN — FUROSEMIDE 20 MG: 20 TABLET ORAL at 08:45

## 2025-03-05 RX ADMIN — CARBIDOPA AND LEVODOPA 1 TABLET: 25; 100 TABLET ORAL at 20:15

## 2025-03-05 RX ADMIN — DOCUSATE SODIUM 100 MG: 100 CAPSULE, LIQUID FILLED ORAL at 08:44

## 2025-03-05 RX ADMIN — ATORVASTATIN CALCIUM 20 MG: 20 TABLET, FILM COATED ORAL at 20:15

## 2025-03-05 RX ADMIN — CARBIDOPA AND LEVODOPA 1 TABLET: 25; 100 TABLET ORAL at 08:44

## 2025-03-05 RX ADMIN — ACETAMINOPHEN 650 MG: 325 TABLET, FILM COATED ORAL at 20:15

## 2025-03-05 RX ADMIN — LISINOPRIL 5 MG: 5 TABLET ORAL at 08:45

## 2025-03-05 RX ADMIN — CETIRIZINE HYDROCHLORIDE 10 MG: 10 TABLET, FILM COATED ORAL at 08:45

## 2025-03-05 ASSESSMENT — COGNITIVE AND FUNCTIONAL STATUS - GENERAL
DRESSING REGULAR UPPER BODY CLOTHING: A LITTLE
CLIMB 3 TO 5 STEPS WITH RAILING: TOTAL
DAILY ACTIVITIY SCORE: 17
HELP NEEDED FOR BATHING: A LITTLE
WALKING IN HOSPITAL ROOM: A LOT
DRESSING REGULAR UPPER BODY CLOTHING: A LOT
MOVING FROM LYING ON BACK TO SITTING ON SIDE OF FLAT BED WITH BEDRAILS: A LITTLE
TURNING FROM BACK TO SIDE WHILE IN FLAT BAD: A LITTLE
DRESSING REGULAR LOWER BODY CLOTHING: A LITTLE
TURNING FROM BACK TO SIDE WHILE IN FLAT BAD: A LOT
HELP NEEDED FOR BATHING: A LOT
MOVING TO AND FROM BED TO CHAIR: A LOT
PERSONAL GROOMING: A LITTLE
MOBILITY SCORE: 12
TOILETING: A LOT
MOVING FROM LYING ON BACK TO SITTING ON SIDE OF FLAT BED WITH BEDRAILS: A LITTLE
DRESSING REGULAR LOWER BODY CLOTHING: A LOT
MOVING TO AND FROM BED TO CHAIR: A LOT
WALKING IN HOSPITAL ROOM: A LOT
PERSONAL GROOMING: A LITTLE
CLIMB 3 TO 5 STEPS WITH RAILING: A LOT
DAILY ACTIVITIY SCORE: 16
STANDING UP FROM CHAIR USING ARMS: A LOT
TOILETING: A LOT
STANDING UP FROM CHAIR USING ARMS: A LOT
MOBILITY SCORE: 14

## 2025-03-05 ASSESSMENT — PAIN - FUNCTIONAL ASSESSMENT
PAIN_FUNCTIONAL_ASSESSMENT: 0-10

## 2025-03-05 ASSESSMENT — ACTIVITIES OF DAILY LIVING (ADL)
HOME_MANAGEMENT_TIME_ENTRY: 19
ADL_ASSISTANCE: INDEPENDENT
BATHING_LEVEL_OF_ASSISTANCE: MINIMUM ASSISTANCE;MODERATE ASSISTANCE
ADL_ASSISTANCE: INDEPENDENT
BATHING_ASSISTANCE: SPONGE BATHING
BATHING_ASSISTANCE: SPONGE BATHING
BATHING_ASSISTANCE: MINIMAL

## 2025-03-05 ASSESSMENT — PAIN DESCRIPTION - ORIENTATION: ORIENTATION: RIGHT;LEFT

## 2025-03-05 ASSESSMENT — ENCOUNTER SYMPTOMS
ACTIVITY CHANGE: 0
TREMORS: 1
APPETITE CHANGE: 0

## 2025-03-05 ASSESSMENT — PAIN SCALES - GENERAL
PAINLEVEL_OUTOF10: 2
PAINLEVEL_OUTOF10: 0 - NO PAIN
PAINLEVEL_OUTOF10: 5 - MODERATE PAIN
PAINLEVEL_OUTOF10: 0 - NO PAIN

## 2025-03-05 ASSESSMENT — PAIN DESCRIPTION - LOCATION: LOCATION: FOOT

## 2025-03-05 NOTE — PROGRESS NOTES
Shashi Gutierrez is a 79 y.o. male on day 0 of admission presenting with Parkinson disease, symptomatic (Multi).      Subjective   No acute events overnight. Patient is back at his baseline. Now awaiting placement        Objective     Last Recorded Vitals  /69 (BP Location: Right arm, Patient Position: Sitting)   Pulse 77   Temp 36.3 °C (97.3 °F) (Temporal)   Resp 17   Wt 90.3 kg (199 lb)   SpO2 96%   Intake/Output last 3 Shifts:    Intake/Output Summary (Last 24 hours) at 3/5/2025 1429  Last data filed at 3/5/2025 0823  Gross per 24 hour   Intake --   Output 950 ml   Net -950 ml       Admission Weight  Weight: 90.3 kg (199 lb) (03/04/25 0604)    Daily Weight  03/04/25 : 90.3 kg (199 lb)    Image Results  ECG 12 lead  Normal sinus rhythm  Incomplete right bundle branch block  Left anterior fascicular block  Minimal voltage criteria for LVH, may be normal variant ( R in aVL )  T wave abnormality, consider lateral ischemia  Abnormal ECG  When compared with ECG of 07-OCT-2024 10:37,  Incomplete right bundle branch block is now Present  See ED provider note for full interpretation and clinical correlation  Confirmed by Tracy Greer ([id number]) on 3/4/2025 12:34:20 PM  XR chest 1 view  Narrative: Interpreted By:  Jignesh Mcdonough,   STUDY:  XR CHEST 1 VIEW;  3/4/2025 7:19 am      INDICATION:  Signs/Symptoms:sob.      COMPARISON:  CT abdomen pelvis from 08/29/2023 chest x-rays, most recent from  10/07/2024.      ACCESSION NUMBER(S):  [id number]      ORDERING CLINICIAN:  BOY CUADRA      TECHNIQUE:  Single AP portable view of the chest was obtained.      FINDINGS:  MEDIASTINUM/ LUNGS/ SHELIA:  Mild cardiomegaly, currently without vascular congestion, or pleural  effusion. Scant atelectasis at the extreme right base just above the  diaphragm. Band of stable linear scarring in the left lingula. No  abnormal opacity in either lung worrisome for tumor or pneumonia. No  pneumothorax. No tracheal deviation.  No  abnormal hilar fullness or gross mass on either side.      BONES:  No lytic or blastic destructive bone lesion.  There is  mild-to-moderate disc space narrowing and endplate osteophytosis  throughout the thoracic spine.      UPPER ABDOMEN:  Grossly intact.      Impression: Mild cardiomegaly.  Currently without radiographic evidence of CHF or  pneumonia.      Additional findings as above.      MACRO:  None      Signed by: Jignesh Mcdonough 3/4/2025 8:06 AM  Dictation workstation:   [id number]      Physical Exam  Cardiovascular:      Rate and Rhythm: Normal rate and regular rhythm.   Pulmonary:      Effort: Pulmonary effort is normal.      Breath sounds: Normal breath sounds.   Skin:     General: Skin is warm.   Neurological:      General: No focal deficit present.      Mental Status: He is alert and oriented to person, place, and time. Mental status is at baseline.         Relevant Results               Assessment/Plan                  Assessment & Plan  Parkinson disease, symptomatic (Multi)  Now awaiting placement     Parkinson's disease   Worsening symptoms 2/2 to missed doses      - continue carbidopa-levodopa      Hx of Atrial fibrillation   - continue telemetry monitoring   - continue xarelto     HFpEF   - evidence of mild volume overload on admission, improved   - continue lasix and spironolactone      HLD   - continue statin      Prostate cancer  - nil acute. Monitor for urinary retention         CKD 3a  - creatinine consistent with baseline   - avoid nephrotoxic agents      Mild intermittent asthma   - not in exacerbation   - continue albuterol PRN      VTE/GI Prophylaxis   - therapeutic AC on xarelto   - bowel regimen in place               Alvina Spear MD

## 2025-03-05 NOTE — CARE PLAN
The patient's goals for the shift include braxton rest    The clinical goals for the shift include safety      Problem: Pain - Adult  Goal: Verbalizes/displays adequate comfort level or baseline comfort level  Outcome: Progressing     Problem: Safety - Adult  Goal: Free from fall injury  Outcome: Progressing     Problem: Discharge Planning  Goal: Discharge to home or other facility with appropriate resources  Outcome: Progressing     Problem: Chronic Conditions and Co-morbidities  Goal: Patient's chronic conditions and co-morbidity symptoms are monitored and maintained or improved  Outcome: Progressing     Problem: Nutrition  Goal: Nutrient intake appropriate for maintaining nutritional needs  Outcome: Progressing

## 2025-03-05 NOTE — PROGRESS NOTES
Occupational Therapy  Evaluation/Treatment    Patient Name: Shashi Gutierrez  MRN: [id number]  : 1945  Today's Date: 25  Time Calculation  Start Time: 952  Stop Time:   Time Calculation (min): 34 min       Assessment:  OT Assessment: Pt demonstrates decreased balance impacting independence with functional tasks. OT to address ADLs, transfer training, balance training, and d/c planning. Recommend d/c to Mod intensity to optimize and promote independence and safety with functional tasks.  Prognosis: Good  Barriers to Discharge Home: Caregiver assistance, Physical needs  Caregiver Assistance: Caregiver assistance needed per identified barriers - however, level of patient's required assistance exceeds assistance available at home  Physical Needs: Stair navigation into home limited by function/safety, Ambulating household distances limited by function/safety  Evaluation/Treatment Tolerance: Patient tolerated treatment well  Medical Staff Made Aware: Yes  End of Session Communication: Bedside nurse  End of Session Patient Position: Up in chair, Alarm on  OT Assessment Results: Decreased ADL status, Decreased functional mobility  Prognosis: Good  Evaluation/Treatment Tolerance: Patient tolerated treatment well  Medical Staff Made Aware: Yes  Strengths: Capable of completing ADLs semi/independent, Housing layout  Barriers to Participation: Comorbidities  Plan:  Treatment Interventions: ADL retraining, Functional transfer training, UE strengthening/ROM, Endurance training, Patient/family training, Equipment evaluation/education, Compensatory technique education  OT Frequency: 3 times per week  OT Discharge Recommendations: Moderate intensity level of continued care  OT Recommended Transfer Status: Minimal assist, Assist of 1  OT - OK to Discharge: Yes  Treatment Interventions: ADL retraining, Functional transfer training, UE strengthening/ROM, Endurance training, Patient/family training, Equipment  evaluation/education, Compensatory technique education    Subjective   Current Problem:  1. Unable to ambulate        2. Parkinson's disease with dyskinesia and fluctuating manifestations        3. Peripheral edema          General:   OT Received On: 03/05/25  General  Reason for Referral: 79yoM PMH parkinson's disease, afib, HLD, prostate CA presenting with generalized weakness.  Referred By: MARILU LITTLE)  Past Medical History Relevant to Rehab: Parkinson's dissease, HTN, CAD, A-fib on Xarelto  Family/Caregiver Present: No  Prior to Session Communication: Bedside nurse  Patient Position Received: Up in chair, Alarm on  General Comment: Pt agreeable to participate in OT on Obs unit.  Precautions:  Hearing/Visual Limitations: +Glasses (Not available on eval)  Medical Precautions: Fall precautions            Pain:  Pain Assessment  Pain Assessment: 0-10  0-10 (Numeric) Pain Score: 5 - Moderate pain  Pain Type: Acute pain  Pain Location: Foot  Pain Orientation: Right, Left  Response to Interventions: No change in pain    Objective   Cognition:  Overall Cognitive Status: Impaired at baseline             Home Living:  Type of Home: House  Lives With: Adult children  Home Adaptive Equipment: Walker rolling or standard (FWW, rollator)  Home Layout: One level, Able to live on main level with bedroom/bathroom, Full bath main level  Home Access: Stairs to enter with rails  Entrance Stairs-Rails: Right  Entrance Stairs-Number of Steps: 7  Bathroom Shower/Tub: Tub/shower unit  Bathroom Toilet: Handicapped height  Bathroom Equipment: None    Prior Function:  Level of [place]: Independent with ADLs and functional transfers, Needs assistance with homemaking  Receives Help From: Family (Son. Pt reports being home alone for 1 hour at a time at the most)  ADL Assistance: Independent  Bath: Sponge bathing  Ambulatory Assistance: Independent (Pt reports mod IND using FWW vs rollator)  Vocational: Retired  Leisure: Pt enjoys  working on cars  Hand Dominance: Right  Prior Function Comments: Pt reports sleeping in a flat bed at baseline.     ADL:  Grooming Assistance: Minimal  Grooming Deficit: Wash/dry face, Teeth care  Bathing Assistance: Minimal  Bathing Deficit:  (posterior aspect of trunk)  UE Dressing Assistance: Minimal  LE Dressing Assistance: Moderate  LE Dressing Deficit: Don/doff R sock, Don/doff L sock, Thread RLE into underwear, Thread LLE into underwear, Pull up over hips  Toileting Assistance with Device: Moderate  Toileting Deficit: Bedside commode, Perineal hygiene     Activities of Daily Living: Grooming  Grooming Level of Assistance: Minimum assistance, Setup  Grooming Where Assessed: Chair  Grooming Comments: Oral hygiene with s/u and cues for thoroughness. Washing face s/u    UE Bathing  UE Bathing Level of Assistance: Minimum assistance  UE Bathing Where Assessed: Other (Comment) (chair)  UE Bathing Comments: Assist with posterior aspect of trunk    LE Bathing  LE Bathing Level of Assistance: Minimum assistance, Moderate assistance  LE Bathing Where Assessed:  (Chair)  LE Bathing Comments: Assist with bathing feet and lower legs.    UE Dressing  UE Dressing Level of Assistance: Minimum assistance, Contact guard  UE Dressing Where Assessed: Chair    LE Dressing  LE Dressing: Yes  Sock Level of Assistance: Maximum assistance  Adult Briefs Level of Assistance: Moderate assistance  LE Dressing Where Assessed: Chair    Toileting  Toileting Level of Assistance: Minimum assistance, Moderate assistance  Where Assessed: Bedside commode  Toileting Comments: Assist with posterio aspect of ebonie hygiene    Activity Tolerance:  Endurance: Endurance does not limit participation in activity    Functional Standing Tolerance:  Time: 2-5min    Bed Mobility/Transfers: Transfers  Transfer: Yes  Transfer 1  Transfer From 1: Chair with arms to  Transfer to 1: Commode-standard  Technique 1: To right, To left  Transfer Device 1: Walker, Gait  belt  Transfer Level of Assistance 1: Minimum assistance, Moderate verbal cues  Trials/Comments 1: Cues for hand placement and AD mgmt. Extended time to complete. Chair<>BSC  Transfers 2  Technique 2: Sit to stand, Stand to sit  Transfer Device 2: Gait belt, Walker  Transfer Level of Assistance 2: Minimum assistance, Minimal verbal cues  Trials/Comments 2: Cues for foot placement and forward leaning to achieve improved balance during transfers. Retropulsive during STSs, improved with continued practice    Sitting Balance:  Static Sitting Balance  Static Sitting-Balance Support: Feet supported  Static Sitting-Level of Assistance: Independent  Dynamic Sitting Balance  Dynamic Sitting-Balance Support: Feet supported  Dynamic Sitting-Level of Assistance: Independent  Standing Balance:  Static Standing Balance  Static Standing-Balance Support: Bilateral upper extremity supported  Static Standing-Level of Assistance: Minimum assistance, Contact guard  Dynamic Standing Balance  Dynamic Standing-Balance Support: Bilateral upper extremity supported  Dynamic Standing-Level of Assistance: Minimum assistance, Contact guard       Vision:Vision - Basic Assessment  Current Vision: Wears glasses all the time (Not available on eval)    Sensation:  Sensation Comment: Pt reports chronic burning sensation in both feet       Coordination:  Movements are Fluid and Coordinated: No  Upper Body Coordination: Slow movement and coordination.     Hand Function:  Hand Function  Gross Grasp: Functional    Extremities: RUE   RUE : Within Functional Limits and LUE   LUE: Within Functional Limits    Outcome Measures: [facility] Daily Activity  Putting on and taking off regular lower body clothing: A lot  Bathing (including washing, rinsing, drying): A lot  Putting on and taking off regular upper body clothing: A little  Toileting, which includes using toilet, bedpan or urinal: A lot  Taking care of personal grooming such as brushing teeth: A  little  Eating Meals: None  Daily Activity - Total Score: 16        Education Documentation  Body Mechanics, taught by Lexi Llanes OT at 3/5/2025 11:13 AM.  Learner: Patient  Readiness: Acceptance  Method: Explanation, Demonstration  Response: Needs Reinforcement  Comment: Pt educated on purpose of OT, safety with transfers and AD mgmt, use of call bell to decrease fall risk.    ADL Training, taught by Lexi Llanes OT at 3/5/2025 11:13 AM.  Learner: Patient  Readiness: Acceptance  Method: Explanation, Demonstration  Response: Needs Reinforcement  Comment: Pt educated on purpose of OT, safety with transfers and AD mgmt, use of call bell to decrease fall risk.    Education Comments  No comments found.          Goals:  Encounter Problems       Encounter Problems (Active)       Bathing       LTG - Patient will utilize adaptive techniques to bathe body CGA       Start:  03/05/25    Expected End:  03/19/25               Dressing Upper Extremities       LTG - Patient will complete upper body dressing SUP       Start:  03/05/25    Expected End:  03/19/25               Dressings Lower Extremities       LTG - Patient will utilize adaptive techniques/equipment to dress lower body CGA       Start:  03/05/25    Expected End:  03/19/25               Functional Balance       LTG - Patient will maintain standing balance SUP to complete ADLs.       Start:  03/05/25    Expected End:  03/19/25               Grooming       LTG - Patient will complete daily grooming tasks Independent       Start:  03/05/25    Expected End:  03/19/25               Toileting       LTG - Patient will complete daily toileting tasks CGA       Start:  03/05/25    Expected End:  03/19/25

## 2025-03-05 NOTE — CARE PLAN
The patient's goals for the shift include remain HDS    The clinical goals for the shift include safety      Problem: Pain - Adult  Goal: Verbalizes/displays adequate comfort level or baseline comfort level  Outcome: Progressing     Problem: Safety - Adult  Goal: Free from fall injury  Outcome: Progressing     Problem: Discharge Planning  Goal: Discharge to home or other facility with appropriate resources  Outcome: Progressing     Problem: Chronic Conditions and Co-morbidities  Goal: Patient's chronic conditions and co-morbidity symptoms are monitored and maintained or improved  Outcome: Progressing     Problem: Nutrition  Goal: Nutrient intake appropriate for maintaining nutritional needs  Outcome: Progressing     Problem: Bathing  Goal: LTG - Patient will utilize adaptive techniques to bathe body CGA  Outcome: Progressing     Problem: Dressings Lower Extremities  Goal: LTG - Patient will utilize adaptive techniques/equipment to dress lower body CGA  Outcome: Progressing     Problem: Dressing Upper Extremities  Goal: LTG - Patient will complete upper body dressing SUP  Outcome: Progressing     Problem: Grooming  Goal: LTG - Patient will complete daily grooming tasks Independent  Outcome: Progressing     Problem: Toileting  Goal: LTG - Patient will complete daily toileting tasks CGA  Outcome: Progressing

## 2025-03-05 NOTE — PROGRESS NOTES
Physical Therapy    Physical Therapy Evaluation & Treatment    Patient Name: Shashi Gutierrez  MRN: [id number]  Department: [place] [id number]  Room: [address] [place] [address]  Today's Date: 3/5/2025   Time Calculation  Start Time: 0918  Stop Time: 0950  Time Calculation (min): 32 min    Assessment/Plan   PT Assessment  PT Assessment Results: Decreased strength, Decreased endurance, Impaired balance, Decreased mobility, Decreased coordination, Decreased safety awareness, Impaired sensation  Rehab Prognosis: Good  Barriers to Discharge Home: Caregiver assistance, Physical needs  Caregiver Assistance: Caregiver assistance needed per identified barriers - however, level of patient's required assistance exceeds assistance available at home  Physical Needs: Stair navigation into home limited by function/safety, Ambulating household distances limited by function/safety, 24hr mobility assistance needed, 24hr ADL assistance needed, High falls risk due to function or environment  Evaluation/Treatment Tolerance: Patient tolerated treatment well  Medical Staff Made Aware: Yes  Strengths: Ability to acquire knowledge, Access to adaptive/assistive products, Support and attitude of living partners  Barriers to Participation: Comorbidities  End of Session Communication: Bedside nurse, Care Coordinator  Assessment Comment: Pt presents today with decreased balance, coordination, and endurance. Currently, pt is below the reported PLOF requiring mod assist for transfers and for short distance ambulation. Continued PT would benefit the pt to address the above deficits and reduce pt's risk of falls at D/C. At D/C, pt is anticipated to benefit from moderate intensity therapy.  End of Session Patient Position: Up in chair, Alarm on   IP OR SWING BED PT PLAN  Inpatient or Swing Bed: Inpatient  PT Plan  Treatment/Interventions: Bed mobility, Transfer training, Gait training, Stair training, Balance training, Neuromuscular re-education, Strengthening, Endurance training,  Therapeutic exercise, Therapeutic activity, Home exercise program, Postural re-education  PT Plan: Ongoing PT  PT Frequency: 3 times per week  PT Discharge Recommendations: Moderate intensity level of continued care  Equipment Recommended upon Discharge:  (Pt owns FWW and rollator)  PT Recommended Transfer Status: Assist x1, Assistive device (Moderate assistance)  PT - OK to Discharge: Yes (PT POC initiated today)    Subjective     General Visit Information:  General  Reason for Referral: 79 y.o. M with Hx of Parkinson's disease presenting with c/o stiffness and generalized deconditioning.  Referred By: MARILU LITTLE)  Past Medical History Relevant to Rehab: Parkinson's dissease, HTN, CAD, A-fib on Xarelto  Family/Caregiver Present: No  Prior to Session Communication: Bedside nurse  Patient Position Received: Bed, 3 rail up, Alarm on  Preferred Learning Style: auditory, kinesthetic, visual  General Comment: Pt supine in bed upon PT arrival. Cleared to participate with RN, and agreeable to PT evaluation.  Home Living:  Home Living  Type of Home: House  Lives With: Adult children (Son)  Home Adaptive Equipment: Walker rolling or standard (FWW, rollator)  Home Layout: One level, Able to live on main level with bedroom/bathroom, Full bath main level  Home Access: Stairs to enter with rails  Entrance Stairs-Rails: Right  Entrance Stairs-Number of Steps: 7  Bathroom Shower/Tub: Tub/shower unit  Bathroom Toilet: Handicapped height  Bathroom Equipment: None  Prior Level of Function:  Prior Function Per Pt/Caregiver Report  Level of [place]: Independent with ADLs and functional transfers, Needs assistance with homemaking  Receives Help From: Family (Son. Pt reports bieng home alone for 1 hour at a time at the most)  ADL Assistance: Independent  Bath: Sponge bathing  Toileting:  (Independent)  Dressing:  (Independent)  Homemaking Assistance:  (Pt reports son completes all IADLs and provides transportation)  Ambulatory  Assistance: Independent (Pt reports mod IND using FWW vs rollator)  Leisure: Pt enjoys working on cars  Hand Dominance: Right  Prior Function Comments: Pt reports sleeping in a flat bed at baseline.  Precautions:  Precautions  Hearing/Visual Limitations: +Glasses  Medical Precautions: Fall precautions    Objective   Pain:  Pain Assessment  Pain Assessment: 0-10  0-10 (Numeric) Pain Score: 5 - Moderate pain  Pain Type: Acute pain  Pain Location: Foot  Pain Orientation: Right, Left  Pain Interventions: Ambulation/increased activity, Repositioned  Response to Interventions: No change in pain (RN notified of pt pain rating)  Cognition:  Cognition  Orientation Level: Disoriented to time (Pt able to state being at the hospital but unsure which one. Pt able to state the current month. Choices required for pt to state the current year.)    General Assessments:  Activity Tolerance  Activity Tolerance Comments: Fair tolerance to activity    Sensation  Sensation Comment: Pt reports chronic burning sensation in both feet    Strength  Strength Comments: BLE MMT grossly 4/5  Coordination  Movements are Fluid and Coordinated: No  Finger to Target: Dysmetria (LUE>RUE)  Coordination Comment: Pt demonstrates tremors of the R hand at rest. Finger to thumb approximation intact bilaterally but pt performs slowly.    Postural Control  Postural Control: Impaired  Head Control: Forward head posture  Trunk Control: Trunk flexion in standing  Posture Comment: Rounded shoulders. Significant hip flexion observed in standing.    Static Sitting Balance  Static Sitting-Balance Support: Bilateral upper extremity supported, Feet supported  Static Sitting-Level of Assistance: Close supervision  Static Sitting-Comment/Number of Minutes: At the EOB    Static Standing Balance  Static Standing-Balance Support: Bilateral upper extremity supported  Static Standing-Level of Assistance: Minimum assistance  Static Standing-Comment/Number of Minutes: +Gait  belt with FWW support  Dynamic Standing Balance  Dynamic Standing-Balance Support: Bilateral upper extremity supported  Dynamic Standing-Level of Assistance: Moderate assistance  Dynamic Standing-Balance: Turning  Dynamic Standing-Comments: +Gait belt with FWW support  Functional Assessments:  Stairs  Stairs: No  Extremity/Trunk Assessments:  Strength RLE  RLE Overall Strength:  (Grossly 4/5 MMT)  LLE   LLE : Exceptions to WFL  Strength LLE  LLE Overall Strength:  (Grossly 4/5 MMT)  Treatments:  Bed Mobility  Bed Mobility: Yes  Bed Mobility 1  Bed Mobility 1: Supine to sitting  Level of Assistance 1: Close supervision  Bed Mobility Comments 1: HOB elevated 39 degrees. Pt able to progress BLE off the EOB and use bed rail to assist trunk into urpight sitting. Significantly increased time required to complete.    Ambulation/Gait Training  Ambulation/Gait Training Performed: Yes  Ambulation/Gait Training 1  Surface 1: Level tile  Device 1: Rolling walker  Gait Support Devices: Gait belt  Assistance 1: Moderate assistance, Moderate verbal cues  Quality of Gait 1: Narrow base of support, Forward flexed posture, Decreased step length  Comments/Distance (ft) 1: 4 steps forward, 3 steps in transition to chair. Pt demonstrates overall bradykinesia requiring VC to initiate steps bilaterally. VC for increased step length/height. Pt demonstrates continued flexion at head/neck and trunk. Assist required for balance.  Transfers  Transfer: Yes  Transfer 1  Transfer From 1: Bed to  Transfer to 1: Stand  Technique 1: Sit to stand  Transfer Device 1: Walker, Gait belt  Transfer Level of Assistance 1: Moderate assistance, Moderate verbal cues, Moderate tactile cues  Trials/Comments 1: x 2 Trials. Unsuccessful first trial. Pt ablet o reach standing in second trial with mod assist. Pt attempts to rest B hands on FWW to stand. VC for B hand placement on bed for BUE push to stand. Pt demonstrates narrow VIVI of feet before transfer. VC for  normalized spacing for VIVI. VC for R hand placement on FWW improved balance/support in standing.TC to the posterior L hip in standing to encourage hip extension and improved posture.  Transfers 2  Transfer From 2: Stand to  Transfer to 2: Chair with arms  Technique 2: Stand to sit  Transfer Device 2: Walker, Gait belt  Transfer Level of Assistance 2: Minimum assistance, Minimal verbal cues, Moderate tactile cues  Trials/Comments 2: VC for BLE positioning against the chair before attempting to sit. VC/TC for BUE reach for the armrests of the chair for controlled descent. Assist provided for controlled descent.  Outcome Measures:  [facility] Basic Mobility  Turning from your back to your side while in a flat bed without using bedrails: A little  Moving from lying on your back to sitting on the side of a flat bed without using bedrails: A lot  Moving to and from bed to chair (including a wheelchair): A lot  Standing up from a chair using your arms (e.g. wheelchair or bedside chair): A lot  To walk in hospital room: A lot  Climbing 3-5 steps with railing: Total  Basic Mobility - Total Score: 12    Encounter Problems       Encounter Problems (Active)       Balance       Goal 1 (Progressing)       Start:  03/05/25    Expected End:  03/19/25       Pt performs all sitting balance with supervision and standing balance with min assist x 1 using FWW            Mobility       STG - Patient will ambulate (Progressing)       Start:  03/05/25    Expected End:  03/19/25       25 ft with min assist x 1 using FWW with proper gait mechanics            PT Transfers       STG - Patient to transfer to and from sit to supine (Progressing)       Start:  03/05/25    Expected End:  03/19/25       IND         STG - Patient will transfer sit to and from stand (Progressing)       Start:  03/05/25    Expected End:  03/19/25       With min assist x 1 using FWW with proper body mechanics              Education Documentation  Precautions, taught by  Eduardo Thomas PT at 3/5/2025 10:54 AM.  Learner: Patient  Readiness: Acceptance  Method: Explanation, Demonstration  Response: Needs Reinforcement    Body Mechanics, taught by Eduardo Thomas PT at 3/5/2025 10:54 AM.  Learner: Patient  Readiness: Acceptance  Method: Explanation, Demonstration  Response: Needs Reinforcement    Mobility Training, taught by Eduardo Thomas PT at 3/5/2025 10:54 AM.  Learner: Patient  Readiness: Acceptance  Method: Explanation, Demonstration  Response: Needs Reinforcement

## 2025-03-06 ENCOUNTER — APPOINTMENT (OUTPATIENT)
Dept: CARDIOLOGY | Facility: HOSPITAL | Age: 80
End: 2025-03-06
Payer: COMMERCIAL

## 2025-03-06 VITALS
TEMPERATURE: 97.3 F | HEIGHT: 70 IN | SYSTOLIC BLOOD PRESSURE: 147 MMHG | WEIGHT: 199 LBS | BODY MASS INDEX: 28.49 KG/M2 | HEART RATE: 69 BPM | OXYGEN SATURATION: 96 % | RESPIRATION RATE: 18 BRPM | DIASTOLIC BLOOD PRESSURE: 72 MMHG

## 2025-03-06 PROCEDURE — 93005 ELECTROCARDIOGRAM TRACING: CPT

## 2025-03-06 PROCEDURE — 99238 HOSP IP/OBS DSCHRG MGMT 30/<: CPT | Performed by: STUDENT IN AN ORGANIZED HEALTH CARE EDUCATION/TRAINING PROGRAM

## 2025-03-06 PROCEDURE — G0378 HOSPITAL OBSERVATION PER HR: HCPCS

## 2025-03-06 PROCEDURE — 2500000001 HC RX 250 WO HCPCS SELF ADMINISTERED DRUGS (ALT 637 FOR MEDICARE OP): Performed by: STUDENT IN AN ORGANIZED HEALTH CARE EDUCATION/TRAINING PROGRAM

## 2025-03-06 RX ADMIN — DOCUSATE SODIUM 100 MG: 100 CAPSULE, LIQUID FILLED ORAL at 10:02

## 2025-03-06 RX ADMIN — CARBIDOPA AND LEVODOPA 1 TABLET: 25; 100 TABLET ORAL at 10:02

## 2025-03-06 RX ADMIN — LISINOPRIL 5 MG: 5 TABLET ORAL at 10:03

## 2025-03-06 RX ADMIN — CETIRIZINE HYDROCHLORIDE 10 MG: 10 TABLET, FILM COATED ORAL at 10:03

## 2025-03-06 RX ADMIN — FUROSEMIDE 20 MG: 20 TABLET ORAL at 10:02

## 2025-03-06 ASSESSMENT — PAIN - FUNCTIONAL ASSESSMENT
PAIN_FUNCTIONAL_ASSESSMENT: 0-10

## 2025-03-06 ASSESSMENT — COGNITIVE AND FUNCTIONAL STATUS - GENERAL
HELP NEEDED FOR BATHING: A LITTLE
WALKING IN HOSPITAL ROOM: A LOT
STANDING UP FROM CHAIR USING ARMS: A LOT
DAILY ACTIVITIY SCORE: 17
MOVING FROM LYING ON BACK TO SITTING ON SIDE OF FLAT BED WITH BEDRAILS: A LITTLE
PERSONAL GROOMING: A LITTLE
TURNING FROM BACK TO SIDE WHILE IN FLAT BAD: A LITTLE
TOILETING: A LOT
MOBILITY SCORE: 14
CLIMB 3 TO 5 STEPS WITH RAILING: A LOT
DRESSING REGULAR LOWER BODY CLOTHING: A LITTLE
MOVING TO AND FROM BED TO CHAIR: A LOT
DRESSING REGULAR UPPER BODY CLOTHING: A LOT

## 2025-03-06 ASSESSMENT — PAIN SCALES - GENERAL
PAINLEVEL_OUTOF10: 2
PAINLEVEL_OUTOF10: 2
PAINLEVEL_OUTOF10: 0 - NO PAIN

## 2025-03-06 NOTE — CARE PLAN
The patient's goals for the shift include free from falls    The clinical goals for the shift include remain free from falls      Problem: Pain - Adult  Goal: Verbalizes/displays adequate comfort level or baseline comfort level  Outcome: Progressing     Problem: Safety - Adult  Goal: Free from fall injury  Outcome: Progressing     Problem: Discharge Planning  Goal: Discharge to home or other facility with appropriate resources  Outcome: Progressing     Problem: Chronic Conditions and Co-morbidities  Goal: Patient's chronic conditions and co-morbidity symptoms are monitored and maintained or improved  Outcome: Progressing     Problem: Nutrition  Goal: Nutrient intake appropriate for maintaining nutritional needs  Outcome: Progressing     Problem: Bathing  Goal: LTG - Patient will utilize adaptive techniques to bathe body CGA  Outcome: Progressing     Problem: Dressings Lower Extremities  Goal: LTG - Patient will utilize adaptive techniques/equipment to dress lower body CGA  Outcome: Progressing     Problem: Dressing Upper Extremities  Goal: LTG - Patient will complete upper body dressing SUP  Outcome: Progressing     Problem: Grooming  Goal: LTG - Patient will complete daily grooming tasks Independent  Outcome: Progressing     Problem: Toileting  Goal: LTG - Patient will complete daily toileting tasks CGA  Outcome: Progressing

## 2025-03-06 NOTE — PROGRESS NOTES
03/06/25 1135   Discharge Planning   Expected Discharge Disposition SNF   Does the patient need discharge transport arranged? Yes   [employer] coordination needed? Yes   Has discharge transport been arranged? Yes   What day is the transport expected? 03/06/25   What time is the transport expected? 1330     Patient will discharge to [facility] SN[facility] today. Transport scheduled for 1:30pm. Patient's son Evgeny salcedo.

## 2025-03-06 NOTE — CARE PLAN
The patient's goals for the shift include remain free from falls.    The clinical goals for the shift include remain free from falls      Problem: Pain - Adult  Goal: Verbalizes/displays adequate comfort level or baseline comfort level  Outcome: Progressing     Problem: Safety - Adult  Goal: Free from fall injury  Outcome: Progressing     Problem: Discharge Planning  Goal: Discharge to home or other facility with appropriate resources  Outcome: Progressing     Problem: Chronic Conditions and Co-morbidities  Goal: Patient's chronic conditions and co-morbidity symptoms are monitored and maintained or improved  Outcome: Progressing     Problem: Nutrition  Goal: Nutrient intake appropriate for maintaining nutritional needs  Outcome: Progressing     Problem: Bathing  Goal: LTG - Patient will utilize adaptive techniques to bathe body CGA  Outcome: Progressing     Problem: Dressings Lower Extremities  Goal: LTG - Patient will utilize adaptive techniques/equipment to dress lower body CGA  Outcome: Progressing     Problem: Dressing Upper Extremities  Goal: LTG - Patient will complete upper body dressing SUP  Outcome: Progressing     Problem: Grooming  Goal: LTG - Patient will complete daily grooming tasks Independent  Outcome: Progressing     Problem: Toileting  Goal: LTG - Patient will complete daily toileting tasks CGA  Outcome: Progressing

## 2025-03-06 NOTE — PROGRESS NOTES
"Physical Therapy                 Therapy Communication Note    Patient Name: Shashi Gutierrez  MRN: [id number]  Department: [place] [facility] [id number]  Room: [address] [place], [place] [address]  Today's Date: 3/6/2025     Discipline: Physical Therapy    PT Missed Visit: Yes     Missed Visit Reason: Missed Visit Reason: Patient refused (Attempt made. RN preparing pt for \"dc in 30 minutes\".  RN stating ok for pt to participate in PT. Pt declining due to brandi dishcharged to SNF today.)    Missed Time: Attempt      "

## 2025-03-06 NOTE — NURSING NOTE
Patient discharged per provider order. All LDAs discontinued, patient tolerated well. All necessary DC paperwork provided. All personal belongings returned to patient. Report given to [facility] technician. Patient transported via stretcher on RA. All questions and concerns addressed within scope of RN --- Chico Du RN

## 2025-03-06 NOTE — PROGRESS NOTES
03/06/25 1055   Discharge Planning   Type of Post Acute Facility Services Skilled nursing   Expected Discharge Disposition SNF       SNF-                             Facility  [facility]             Nurse/ family notified  Chico Mcgowan Daphne, Emma/Eula to contact family             Report number  [phone] green unit             Transport time 1332

## 2025-03-06 NOTE — NURSING NOTE
Report received by JAVIER Montana at facility. All questions and concerns addressed within scope of RN --- Chico Du RN

## 2025-03-08 NOTE — DISCHARGE SUMMARY
Discharge Diagnosis  Parkinson disease, symptomatic (Multi)    Issues Requiring Follow-Up  None     Discharge Meds     Medication List      CONTINUE taking these medications     acetaminophen 325 mg tablet; Commonly known as: Tylenol; Take 1 tablet   (325 mg) by mouth every 4 hours if needed for fever (temp greater than   38.0 C) or headaches.   albuterol 90 mcg/actuation inhaler; Commonly known as: ProAir HFA;   Inhale 2 puffs every 6 hours if needed for shortness of breath or   wheezing.   atorvastatin 20 mg tablet; Commonly known as: Lipitor; Take 1 tablet (20   mg) by mouth once daily.   carbidopa-levodopa  mg tablet; Commonly known as: Sinemet; Take 1   tablet by mouth 3 times a day.   CENTRUM SILVER ORAL   diclofenac sodium 1 % gel; Commonly known as: Voltaren; Apply 4.5 inches   (4 g) topically 4 times a day.   docusate sodium 100 mg capsule; Commonly known as: Colace; Take 1   capsule (100 mg) by mouth once daily.   fexofenadine 180 mg tablet; Commonly known as: Allegra; Take 1 tablet   (180 mg) by mouth once daily.   fluticasone 50 mcg/actuation nasal spray; Commonly known as: Flonase   furosemide 40 mg tablet; Commonly known as: Lasix; Take 0.5 tablets (20   mg) by mouth once daily.   lisinopril 5 mg tablet; Take 1 tablet (5 mg) by mouth once daily.   rivaroxaban 20 mg tablet; Commonly known as: Xarelto; Take 1 tablet (20   mg) by mouth once daily.     STOP taking these medications     sennosides 8.6 mg tablet; Commonly known as: Senokot   sildenafil 50 mg tablet; Commonly known as: Viagra       Test Results Pending At Discharge  Pending Labs       Order Current Status    Extra Urine Gray Tube Collected (03/05/25 1308)    Urinalysis with Reflex Culture and Microscopic In process            Hospital Course   Shashi Gutierrez is a 79 y.o. male PMH of atrial fibrillation (on xarelto), HFpEF, HLD, prostate ca, Parkinson's disease, CKD 3a, mild intermittent asthma, presenting with weakness. Patient is a poor  historian. He is tired appeared and oriented 2x. Spoke with son on the phone who is the POA. He has been weak this past week. Family is overwhelmed with care and request placement     Pertinent Physical Exam At Time of Discharge  Physical Exam  Tired appearing and generally rigid    Cardiovascular:      Rate and Rhythm: Normal rate and regular rhythm.   Pulmonary:      Effort: Pulmonary effort is normal.      Breath sounds: Normal breath sounds.   Skin:     General: Skin is warm.   Parkinson at baseline   Outpatient Follow-Up  No future appointments.      Alvina Spear MD

## 2025-06-05 ENCOUNTER — APPOINTMENT (OUTPATIENT)
Dept: PODIATRY | Facility: CLINIC | Age: 80
End: 2025-06-05
Payer: COMMERCIAL

## 2025-06-05 DIAGNOSIS — M79.675 TOE PAIN, LEFT: ICD-10-CM

## 2025-06-05 DIAGNOSIS — B35.1 TINEA UNGUIUM: Primary | ICD-10-CM

## 2025-06-05 DIAGNOSIS — M79.674 TOE PAIN, RIGHT: ICD-10-CM

## 2025-06-05 PROCEDURE — 11721 DEBRIDE NAIL 6 OR MORE: CPT | Performed by: PODIATRIST

## 2025-06-05 PROCEDURE — 1036F TOBACCO NON-USER: CPT | Performed by: PODIATRIST

## 2025-06-05 PROCEDURE — 1159F MED LIST DOCD IN RCRD: CPT | Performed by: PODIATRIST

## 2025-06-05 PROCEDURE — 99202 OFFICE O/P NEW SF 15 MIN: CPT | Performed by: PODIATRIST

## 2025-06-05 NOTE — PROGRESS NOTES
CC:  painful thickened and elongated toenails    HPI:  This 79 year old male seen as new pt  presents complaining   painful thickened and elongated toenails that are difficult to manage.  Onset was gradual with worsening course until recently.  Aggravated by shoe gear and ambulation. Has lymphedema and Parkinson's disease.      PCP: Dr. Dueñas  Last visit: 10-24-24     PMH  Medical History[1]  MEDS  Current Medications[2]  Allergies  Allergies[3]  Social History[4]  Family History[5]  Surgical History[6]    REVIEW OF SYSTEMS    DERM:   + as noted in HPI.       Physical examination:   On General Observation: Patient is a pleasant, cooperative, well developed 79 y.o.  adult male. The patient is alert and oriented to time, place and person. Patient has normal affect and mood.  There were no vitals taken for this visit.    Vascular:  DP and PT pulses are 1-2/4 b/l.  Moderate to  severe edema noted. mild varicosities b/l.  CFT  6 seconds to all digits bilateral.  Skin temperature is warm to warm from proximal to distal bilateral.      Muscular: Strength is 5/5 for all instrinsic and extrinsic muscle groups.     Neuro:  Proprioception present.   Sensation to vibration is  present. Protective sensation intact  at all pedal sites via [place] Lizz 5.07 monofilament bilateral.  Light touch present bilateral.     Derm:    Decreased hair growth b/l le  Left toenails: 1-5 Brittleness, crumbling upon debridement, subungual debris, elongation, mycotic appearance, tenderness, and thickness.   Right toenails: 1-5 Brittleness, crumbling upon debridement, subungual debris, elongation, mycotic appearance, tenderness, and thickness.   Skin is induarated, no odor or drainage present.    ASSESSMENT:    Tinea Unguium [B35.1]   Pain in right toe(s) [M79.674]   Pain in left toe(s) [M79.675]       PLAN:   Consult  A comprehensive history and physical examination were preformed. The patient was educated on clinical findings, diagnosis and  treatment plans. Patient understands all that has been explained and all questions were answered to apparent satisfaction.     - Debrided  toenails 1-10 in length and height.   - Follow up in 9-12 weeks.       Roderick Taylor DPM           [1]   Past Medical History:  Diagnosis Date    Atrial fibrillation (Multi)     BPH (benign prostatic hyperplasia)     CAD (coronary artery disease)     CKD (chronic kidney disease)     Diuretic-induced hypokalemia 10/10/2023    ED (erectile dysfunction) 02/12/2023    Encounter for general adult medical examination without abnormal findings 03/04/2019    Encounter for annual health examination    Encounter for general adult medical examination without abnormal findings 01/03/2021    Encounter for annual health examination    Encounter for screening for malignant neoplasm of prostate     Encounter for prostate cancer screening    Hypertension     Internal hemorrhoid 09/14/2012    Kidney lesion 02/12/2023    Occasional tremors 02/12/2023    Onychomycosis 05/05/2023   [2]   Current Outpatient Medications:     acetaminophen (Tylenol) 325 mg tablet, Take 1 tablet (325 mg) by mouth every 4 hours if needed for fever (temp greater than 38.0 C) or headaches., Disp: 30 tablet, Rfl: 2    albuterol (ProAir HFA) 90 mcg/actuation inhaler, Inhale 2 puffs every 6 hours if needed for shortness of breath or wheezing., Disp: 18 g, Rfl: 2    atorvastatin (Lipitor) 20 mg tablet, Take 1 tablet (20 mg) by mouth once daily., Disp: 90 tablet, Rfl: 3    carbidopa-levodopa (Sinemet)  mg tablet, Take 1 tablet by mouth 3 times a day., Disp: 90 tablet, Rfl: 0    diclofenac sodium (Voltaren) 1 % gel, Apply 4.5 inches (4 g) topically 4 times a day., Disp: 100 g, Rfl: 1    docusate sodium (Colace) 100 mg capsule, Take 1 capsule (100 mg) by mouth once daily., Disp: 60 capsule, Rfl: 3    fexofenadine (Allegra) 180 mg tablet, Take 1 tablet (180 mg) by mouth once daily., Disp: 90 tablet, Rfl: 3    fluticasone  (Flonase) 50 mcg/actuation nasal spray, Administer 2 sprays into each nostril once daily as needed for rhinitis. Shake liquid before use, Disp: , Rfl:     folic acid/multivit-min/lutein (CENTRUM SILVER ORAL), Take 1 tablet by mouth once daily., Disp: , Rfl:     furosemide (Lasix) 40 mg tablet, Take 0.5 tablets (20 mg) by mouth once daily., Disp: 45 tablet, Rfl: 2    lisinopril 5 mg tablet, Take 1 tablet (5 mg) by mouth once daily., Disp: 90 tablet, Rfl: 3    rivaroxaban (Xarelto) 20 mg tablet, Take 1 tablet (20 mg) by mouth once daily., Disp: 90 tablet, Rfl: 3  [3]   Allergies  Allergen Reactions    Aspirin Other     Ok with 81m g dose  Reaction: 325 mg dose  has severe rectal bleeding    Carvedilol Unknown     Patient states not sure     Metoprolol Other     Sexual dysfunction   [4]   Social History  Socioeconomic History    Marital status:    Tobacco Use    Smoking status: Former     Types: Cigars     Quit date:      Years since quittin.4    Smokeless tobacco: Never   Substance and Sexual Activity    Alcohol use: Not Currently    Drug use: Never     Social Drivers of Health     Financial Resource Strain: Low Risk  (3/4/2025)    Overall Financial Resource Strain (CARDIA)     Difficulty of Paying Living Expenses: Not hard at all   Food Insecurity: No Food Insecurity (3/4/2025)    Hunger Vital Sign     Worried About Running Out of Food in the Last Year: Never true     Ran Out of Food in the Last Year: Never true   Transportation Needs: No Transportation Needs (3/4/2025)    PRAPARE - Transportation     Lack of Transportation (Medical): No     Lack of Transportation (Non-Medical): No   Physical Activity: Unknown (3/4/2025)    Exercise Vital Sign     Days of Exercise per Week: 0 days     Minutes of Exercise per Session: Patient declined   Stress: No Stress Concern Present (3/4/2025)    [place] [employer] - Occupational Stress Questionnaire     Feeling of Stress : Not at all   Social  Connections: Unknown (3/4/2025)    Social Connection and Isolation Panel [NHANES]     Frequency of Communication with Friends and Family: Twice a week     Frequency of Social Gatherings with Friends and Family: Patient declined     Attends Sabianist Services: Patient declined     Active Member of Clubs or Organizations: Patient declined     Attends Club or Organization Meetings: Patient declined     Marital Status: Patient declined   Intimate Partner Violence: Not At Risk (3/4/2025)    Humiliation, Afraid, Rape, and Kick questionnaire     Fear of Current or Ex-Partner: No     Emotionally Abused: No     Physically Abused: No     Sexually Abused: No   Housing Stability: Low Risk  (3/4/2025)    Housing Stability Vital Sign     Unable to Pay for Housing in the Last Year: No     Number of Times Moved in the Last Year: 0     Homeless in the Last Year: No   [5]   Family History  Problem Relation Name Age of Onset    No Known Problems Other     [6]   Past Surgical History:  Procedure Laterality Date    CT ANGIO CORONARY ART WITH HEARTFLOW IF SCORE >30%  8/27/2018    CT HEART CORONARY ANGIOGRAM 8/27/2018 [facility]

## 2025-08-14 ENCOUNTER — APPOINTMENT (OUTPATIENT)
Dept: PODIATRY | Facility: CLINIC | Age: 80
End: 2025-08-14
Payer: COMMERCIAL

## 2025-08-14 DIAGNOSIS — M79.675 TOE PAIN, LEFT: ICD-10-CM

## 2025-08-14 DIAGNOSIS — B35.1 TINEA UNGUIUM: Primary | ICD-10-CM

## 2025-08-14 DIAGNOSIS — M79.674 TOE PAIN, RIGHT: ICD-10-CM

## 2025-08-14 PROCEDURE — 1159F MED LIST DOCD IN RCRD: CPT | Performed by: PODIATRIST

## 2025-08-14 PROCEDURE — 11721 DEBRIDE NAIL 6 OR MORE: CPT | Performed by: PODIATRIST

## 2025-11-20 ENCOUNTER — APPOINTMENT (OUTPATIENT)
Dept: PODIATRY | Facility: CLINIC | Age: 80
End: 2025-11-20
Payer: COMMERCIAL